# Patient Record
Sex: MALE | Race: WHITE | NOT HISPANIC OR LATINO | Employment: OTHER | URBAN - METROPOLITAN AREA
[De-identification: names, ages, dates, MRNs, and addresses within clinical notes are randomized per-mention and may not be internally consistent; named-entity substitution may affect disease eponyms.]

---

## 2017-03-03 PROBLEM — Z00.00 ROUTINE HEALTH MAINTENANCE: Status: ACTIVE | Noted: 2017-03-03

## 2017-04-06 PROBLEM — F17.200 TOBACCO DEPENDENCE: Status: ACTIVE | Noted: 2017-04-06

## 2017-06-05 PROBLEM — Z00.00 ROUTINE HEALTH MAINTENANCE: Status: RESOLVED | Noted: 2017-03-03 | Resolved: 2017-06-05

## 2017-08-21 PROBLEM — Z12.11 SCREEN FOR COLON CANCER: Status: ACTIVE | Noted: 2017-08-21

## 2017-09-08 PROBLEM — Z12.11 SCREEN FOR COLON CANCER: Status: RESOLVED | Noted: 2017-08-21 | Resolved: 2017-09-08

## 2018-03-09 PROBLEM — L08.9 LOCAL SKIN INFECTION: Status: ACTIVE | Noted: 2018-03-09

## 2018-06-22 PROBLEM — M25.531 RIGHT WRIST PAIN: Status: ACTIVE | Noted: 2018-06-22

## 2022-01-01 ENCOUNTER — HOSPITAL ENCOUNTER (EMERGENCY)
Facility: HOSPITAL | Age: 62
Discharge: HOME | End: 2022-12-22
Attending: SURGERY
Payer: MEDICARE

## 2022-01-01 ENCOUNTER — HOSPITAL ENCOUNTER (INPATIENT)
Facility: HOSPITAL | Age: 62
LOS: 5 days | Discharge: HOME | DRG: 192 | End: 2023-01-02
Attending: EMERGENCY MEDICINE | Admitting: INTERNAL MEDICINE
Payer: MEDICARE

## 2022-01-01 VITALS
RESPIRATION RATE: 24 BRPM | SYSTOLIC BLOOD PRESSURE: 147 MMHG | TEMPERATURE: 98 F | WEIGHT: 135 LBS | OXYGEN SATURATION: 100 % | BODY MASS INDEX: 20.46 KG/M2 | HEART RATE: 81 BPM | DIASTOLIC BLOOD PRESSURE: 83 MMHG | HEIGHT: 68 IN

## 2022-01-01 DIAGNOSIS — F17.200 TOBACCO DEPENDENCE: ICD-10-CM

## 2022-01-01 DIAGNOSIS — R06.00 DYSPNEA, UNSPECIFIED TYPE: ICD-10-CM

## 2022-01-01 DIAGNOSIS — J44.1 COPD EXACERBATION: Primary | ICD-10-CM

## 2022-01-01 DIAGNOSIS — J40 BRONCHITIS: ICD-10-CM

## 2022-01-01 DIAGNOSIS — J44.9 STAGE 4 VERY SEVERE COPD BY GOLD CLASSIFICATION: ICD-10-CM

## 2022-01-01 DIAGNOSIS — J44.1 COPD EXACERBATION: ICD-10-CM

## 2022-01-01 DIAGNOSIS — R06.02 SOB (SHORTNESS OF BREATH): Primary | ICD-10-CM

## 2022-01-01 DIAGNOSIS — R06.02 SHORTNESS OF BREATH: ICD-10-CM

## 2022-01-01 DIAGNOSIS — R07.9 CHEST PAIN: ICD-10-CM

## 2022-01-01 LAB
ALBUMIN SERPL BCP-MCNC: 3.9 G/DL (ref 3.5–5.2)
ALBUMIN SERPL BCP-MCNC: 4 G/DL (ref 3.5–5.2)
ALLENS TEST: ABNORMAL
ALLENS TEST: ABNORMAL
ALP SERPL-CCNC: 79 U/L (ref 55–135)
ALP SERPL-CCNC: 88 U/L (ref 55–135)
ALT SERPL W/O P-5'-P-CCNC: 20 U/L (ref 10–44)
ALT SERPL W/O P-5'-P-CCNC: 26 U/L (ref 10–44)
ANION GAP SERPL CALC-SCNC: 11 MMOL/L (ref 8–16)
ANION GAP SERPL CALC-SCNC: 14 MMOL/L (ref 8–16)
AST SERPL-CCNC: 18 U/L (ref 10–40)
AST SERPL-CCNC: 23 U/L (ref 10–40)
BASOPHILS # BLD AUTO: 0.1 K/UL (ref 0–0.2)
BASOPHILS # BLD AUTO: 0.1 K/UL (ref 0–0.2)
BASOPHILS NFR BLD: 0.5 % (ref 0–1.9)
BASOPHILS NFR BLD: 0.8 % (ref 0–1.9)
BILIRUB SERPL-MCNC: 0.5 MG/DL (ref 0.1–1)
BILIRUB SERPL-MCNC: 1 MG/DL (ref 0.1–1)
BILIRUB UR QL STRIP: NEGATIVE
BILIRUB UR QL STRIP: NEGATIVE
BNP SERPL-MCNC: 27 PG/ML (ref 0–99)
BNP SERPL-MCNC: 52 PG/ML (ref 0–99)
BUN SERPL-MCNC: 11 MG/DL (ref 8–23)
BUN SERPL-MCNC: 6 MG/DL (ref 8–23)
CALCIUM SERPL-MCNC: 9.8 MG/DL (ref 8.7–10.5)
CALCIUM SERPL-MCNC: 9.8 MG/DL (ref 8.7–10.5)
CHLORIDE SERPL-SCNC: 100 MMOL/L (ref 95–110)
CHLORIDE SERPL-SCNC: 100 MMOL/L (ref 95–110)
CK SERPL-CCNC: 68 U/L (ref 20–200)
CLARITY UR: CLEAR
CLARITY UR: CLEAR
CO2 SERPL-SCNC: 25 MMOL/L (ref 23–29)
CO2 SERPL-SCNC: 25 MMOL/L (ref 23–29)
COLOR UR: YELLOW
COLOR UR: YELLOW
CREAT SERPL-MCNC: 0.7 MG/DL (ref 0.5–1.4)
CREAT SERPL-MCNC: 0.8 MG/DL (ref 0.5–1.4)
DELSYS: ABNORMAL
DELSYS: ABNORMAL
DIFFERENTIAL METHOD: ABNORMAL
DIFFERENTIAL METHOD: ABNORMAL
EOSINOPHIL # BLD AUTO: 0.1 K/UL (ref 0–0.5)
EOSINOPHIL # BLD AUTO: 0.5 K/UL (ref 0–0.5)
EOSINOPHIL NFR BLD: 0.7 % (ref 0–8)
EOSINOPHIL NFR BLD: 4.1 % (ref 0–8)
ERYTHROCYTE [DISTWIDTH] IN BLOOD BY AUTOMATED COUNT: 12.6 % (ref 11.5–14.5)
ERYTHROCYTE [DISTWIDTH] IN BLOOD BY AUTOMATED COUNT: 13 % (ref 11.5–14.5)
EST. GFR  (NO RACE VARIABLE): >60 ML/MIN/1.73 M^2
EST. GFR  (NO RACE VARIABLE): >60 ML/MIN/1.73 M^2
FIO2: 21 (ref 21–100)
FIO2: 24 (ref 21–100)
GLUCOSE SERPL-MCNC: 80 MG/DL (ref 70–110)
GLUCOSE SERPL-MCNC: 94 MG/DL (ref 70–110)
GLUCOSE UR QL STRIP: ABNORMAL
GLUCOSE UR QL STRIP: NEGATIVE
HCO3 UR-SCNC: 27.4 MMOL/L
HCO3 UR-SCNC: 29.8 MMOL/L
HCT VFR BLD AUTO: 44.7 % (ref 40–54)
HCT VFR BLD AUTO: 49.9 % (ref 40–54)
HGB BLD-MCNC: 15.6 G/DL (ref 14–18)
HGB BLD-MCNC: 16.9 G/DL (ref 14–18)
HGB UR QL STRIP: ABNORMAL
HGB UR QL STRIP: ABNORMAL
IMM GRANULOCYTES # BLD AUTO: 0.07 K/UL (ref 0–0.04)
IMM GRANULOCYTES # BLD AUTO: 0.09 K/UL (ref 0–0.04)
IMM GRANULOCYTES NFR BLD AUTO: 0.5 % (ref 0–0.5)
IMM GRANULOCYTES NFR BLD AUTO: 0.5 % (ref 0–0.5)
INFLUENZA A, MOLECULAR: NEGATIVE
INFLUENZA A, MOLECULAR: NEGATIVE
INFLUENZA B, MOLECULAR: NEGATIVE
INFLUENZA B, MOLECULAR: NEGATIVE
INR PPP: 1 (ref 0.8–1.2)
KETONES UR QL STRIP: ABNORMAL
KETONES UR QL STRIP: NEGATIVE
LEUKOCYTE ESTERASE UR QL STRIP: NEGATIVE
LEUKOCYTE ESTERASE UR QL STRIP: NEGATIVE
LYMPHOCYTES # BLD AUTO: 1.9 K/UL (ref 1–4.8)
LYMPHOCYTES # BLD AUTO: 2.8 K/UL (ref 1–4.8)
LYMPHOCYTES NFR BLD: 21.1 % (ref 18–48)
LYMPHOCYTES NFR BLD: 9.9 % (ref 18–48)
MCH RBC QN AUTO: 33.3 PG (ref 27–31)
MCH RBC QN AUTO: 33.6 PG (ref 27–31)
MCHC RBC AUTO-ENTMCNC: 33.9 G/DL (ref 32–36)
MCHC RBC AUTO-ENTMCNC: 34.9 G/DL (ref 32–36)
MCV RBC AUTO: 96 FL (ref 82–98)
MCV RBC AUTO: 98 FL (ref 82–98)
MONOCYTES # BLD AUTO: 1.1 K/UL (ref 0.3–1)
MONOCYTES # BLD AUTO: 1.4 K/UL (ref 0.3–1)
MONOCYTES NFR BLD: 7.7 % (ref 4–15)
MONOCYTES NFR BLD: 8.3 % (ref 4–15)
NEUTROPHILS # BLD AUTO: 15.1 K/UL (ref 1.8–7.7)
NEUTROPHILS # BLD AUTO: 8.5 K/UL (ref 1.8–7.7)
NEUTROPHILS NFR BLD: 65.2 % (ref 38–73)
NEUTROPHILS NFR BLD: 80.7 % (ref 38–73)
NITRITE UR QL STRIP: NEGATIVE
NITRITE UR QL STRIP: NEGATIVE
NRBC BLD-RTO: 0 /100 WBC
NRBC BLD-RTO: 0 /100 WBC
PCO2 BLDA: 36 MMHG (ref 35–45)
PCO2 BLDA: 41 MMHG (ref 35–45)
PH SMN: 7.47 [PH] (ref 7.35–7.45)
PH SMN: 7.49 [PH] (ref 7.35–7.45)
PH UR STRIP: 7 [PH] (ref 5–8)
PH UR STRIP: 8 [PH] (ref 5–8)
PLATELET # BLD AUTO: 277 K/UL (ref 150–450)
PLATELET # BLD AUTO: 310 K/UL (ref 150–450)
PMV BLD AUTO: 8.9 FL (ref 9.2–12.9)
PMV BLD AUTO: 9.4 FL (ref 9.2–12.9)
PO2 BLDA: 71 MMHG (ref 75–100)
PO2 BLDA: 72 MMHG (ref 75–100)
POC BE: 4.2 MMOL/L (ref -2–2)
POC BE: 5.5 MMOL/L (ref -2–2)
POC COHB: 4.4 % (ref 0–3)
POC COHB: 5.5 % (ref 0–3)
POC METHB: 1.1 % (ref 0–1.5)
POC METHB: 1.2 % (ref 0–1.5)
POC O2HB ARTERIAL: 90.1 % (ref 94–100)
POC O2HB ARTERIAL: 91.1 % (ref 94–100)
POC SATURATED O2: 96.5 % (ref 90–100)
POC SATURATED O2: 96.5 % (ref 90–100)
POC TCO2: 28.5 MMOL/L
POC TCO2: 31.1 MMOL/L
POC THB: 15.9 G/DL (ref 12–18)
POC THB: 15.9 G/DL (ref 12–18)
POTASSIUM SERPL-SCNC: 4 MMOL/L (ref 3.5–5.1)
POTASSIUM SERPL-SCNC: 4.2 MMOL/L (ref 3.5–5.1)
PROT SERPL-MCNC: 7.1 G/DL (ref 6–8.4)
PROT SERPL-MCNC: 7.7 G/DL (ref 6–8.4)
PROT UR QL STRIP: NEGATIVE
PROT UR QL STRIP: NEGATIVE
PROTHROMBIN TIME: 10.3 SEC (ref 9–12.5)
RBC # BLD AUTO: 4.64 M/UL (ref 4.6–6.2)
RBC # BLD AUTO: 5.07 M/UL (ref 4.6–6.2)
SARS-COV-2 RDRP RESP QL NAA+PROBE: NEGATIVE
SARS-COV-2 RDRP RESP QL NAA+PROBE: NEGATIVE
SITE: ABNORMAL
SITE: ABNORMAL
SODIUM SERPL-SCNC: 136 MMOL/L (ref 136–145)
SODIUM SERPL-SCNC: 139 MMOL/L (ref 136–145)
SP GR UR STRIP: 1.01 (ref 1–1.03)
SP GR UR STRIP: 1.01 (ref 1–1.03)
SPECIMEN SOURCE: NORMAL
SPECIMEN SOURCE: NORMAL
TROPONIN I SERPL DL<=0.01 NG/ML-MCNC: 0.01 NG/ML (ref 0–0.03)
TROPONIN I SERPL DL<=0.01 NG/ML-MCNC: 0.01 NG/ML (ref 0–0.03)
TROPONIN I SERPL DL<=0.01 NG/ML-MCNC: <0.006 NG/ML (ref 0–0.03)
URN SPEC COLLECT METH UR: ABNORMAL
URN SPEC COLLECT METH UR: ABNORMAL
UROBILINOGEN UR STRIP-ACNC: NEGATIVE EU/DL
UROBILINOGEN UR STRIP-ACNC: NEGATIVE EU/DL
WBC # BLD AUTO: 13.07 K/UL (ref 3.9–12.7)
WBC # BLD AUTO: 18.74 K/UL (ref 3.9–12.7)

## 2022-01-01 PROCEDURE — 99232 SBSQ HOSP IP/OBS MODERATE 35: CPT | Mod: ,,, | Performed by: FAMILY MEDICINE

## 2022-01-01 PROCEDURE — 25000003 PHARM REV CODE 250: Performed by: FAMILY MEDICINE

## 2022-01-01 PROCEDURE — 25000003 PHARM REV CODE 250: Performed by: INTERNAL MEDICINE

## 2022-01-01 PROCEDURE — 94664 DEMO&/EVAL PT USE INHALER: CPT | Mod: XB

## 2022-01-01 PROCEDURE — 94761 N-INVAS EAR/PLS OXIMETRY MLT: CPT

## 2022-01-01 PROCEDURE — 25000242 PHARM REV CODE 250 ALT 637 W/ HCPCS: Performed by: EMERGENCY MEDICINE

## 2022-01-01 PROCEDURE — 36415 COLL VENOUS BLD VENIPUNCTURE: CPT | Performed by: SURGERY

## 2022-01-01 PROCEDURE — 99232 PR SUBSEQUENT HOSPITAL CARE,LEVL II: ICD-10-PCS | Mod: ,,, | Performed by: FAMILY MEDICINE

## 2022-01-01 PROCEDURE — 25000003 PHARM REV CODE 250: Performed by: EMERGENCY MEDICINE

## 2022-01-01 PROCEDURE — 85025 COMPLETE CBC W/AUTO DIFF WBC: CPT | Performed by: EMERGENCY MEDICINE

## 2022-01-01 PROCEDURE — 84484 ASSAY OF TROPONIN QUANT: CPT | Performed by: EMERGENCY MEDICINE

## 2022-01-01 PROCEDURE — 94640 AIRWAY INHALATION TREATMENT: CPT | Mod: XB

## 2022-01-01 PROCEDURE — 25000242 PHARM REV CODE 250 ALT 637 W/ HCPCS: Performed by: FAMILY MEDICINE

## 2022-01-01 PROCEDURE — 27000492 HC SLEEVE, SCD T/L

## 2022-01-01 PROCEDURE — 87502 INFLUENZA DNA AMP PROBE: CPT | Performed by: SURGERY

## 2022-01-01 PROCEDURE — 25000242 PHARM REV CODE 250 ALT 637 W/ HCPCS: Performed by: SURGERY

## 2022-01-01 PROCEDURE — 82803 BLOOD GASES ANY COMBINATION: CPT | Performed by: SURGERY

## 2022-01-01 PROCEDURE — 94640 AIRWAY INHALATION TREATMENT: CPT

## 2022-01-01 PROCEDURE — 63600175 PHARM REV CODE 636 W HCPCS: Performed by: NURSE PRACTITIONER

## 2022-01-01 PROCEDURE — 63600175 PHARM REV CODE 636 W HCPCS: Performed by: INTERNAL MEDICINE

## 2022-01-01 PROCEDURE — 25000242 PHARM REV CODE 250 ALT 637 W/ HCPCS: Performed by: NURSE PRACTITIONER

## 2022-01-01 PROCEDURE — 27000646 HC AEROBIKA DEVICE

## 2022-01-01 PROCEDURE — 99291 CRITICAL CARE FIRST HOUR: CPT | Mod: 25

## 2022-01-01 PROCEDURE — S4991 NICOTINE PATCH NONLEGEND: HCPCS | Performed by: NURSE PRACTITIONER

## 2022-01-01 PROCEDURE — 82803 BLOOD GASES ANY COMBINATION: CPT | Performed by: EMERGENCY MEDICINE

## 2022-01-01 PROCEDURE — 84484 ASSAY OF TROPONIN QUANT: CPT | Performed by: SURGERY

## 2022-01-01 PROCEDURE — U0002 COVID-19 LAB TEST NON-CDC: HCPCS | Performed by: EMERGENCY MEDICINE

## 2022-01-01 PROCEDURE — G0378 HOSPITAL OBSERVATION PER HR: HCPCS

## 2022-01-01 PROCEDURE — 82803 BLOOD GASES ANY COMBINATION: CPT

## 2022-01-01 PROCEDURE — 11000001 HC ACUTE MED/SURG PRIVATE ROOM

## 2022-01-01 PROCEDURE — 93010 ELECTROCARDIOGRAM REPORT: CPT | Mod: ,,, | Performed by: INTERNAL MEDICINE

## 2022-01-01 PROCEDURE — 99900035 HC TECH TIME PER 15 MIN (STAT)

## 2022-01-01 PROCEDURE — 27000221 HC OXYGEN, UP TO 24 HOURS

## 2022-01-01 PROCEDURE — 83880 ASSAY OF NATRIURETIC PEPTIDE: CPT | Performed by: SURGERY

## 2022-01-01 PROCEDURE — 99285 EMERGENCY DEPT VISIT HI MDM: CPT | Mod: 25

## 2022-01-01 PROCEDURE — 93005 ELECTROCARDIOGRAM TRACING: CPT

## 2022-01-01 PROCEDURE — 93010 EKG 12-LEAD: ICD-10-PCS | Mod: ,,, | Performed by: INTERNAL MEDICINE

## 2022-01-01 PROCEDURE — 63600175 PHARM REV CODE 636 W HCPCS: Performed by: EMERGENCY MEDICINE

## 2022-01-01 PROCEDURE — 99220 PR INITIAL OBSERVATION CARE,LEVL III: CPT | Mod: ,,, | Performed by: FAMILY MEDICINE

## 2022-01-01 PROCEDURE — 80053 COMPREHEN METABOLIC PANEL: CPT | Performed by: SURGERY

## 2022-01-01 PROCEDURE — 83880 ASSAY OF NATRIURETIC PEPTIDE: CPT | Performed by: EMERGENCY MEDICINE

## 2022-01-01 PROCEDURE — 96374 THER/PROPH/DIAG INJ IV PUSH: CPT

## 2022-01-01 PROCEDURE — 27000190 HC CPAP FULL FACE MASK W/VALVE

## 2022-01-01 PROCEDURE — 25000003 PHARM REV CODE 250: Performed by: NURSE PRACTITIONER

## 2022-01-01 PROCEDURE — 63600175 PHARM REV CODE 636 W HCPCS: Performed by: FAMILY MEDICINE

## 2022-01-01 PROCEDURE — 81003 URINALYSIS AUTO W/O SCOPE: CPT | Performed by: SURGERY

## 2022-01-01 PROCEDURE — 99220 PR INITIAL OBSERVATION CARE,LEVL III: ICD-10-PCS | Mod: ,,, | Performed by: FAMILY MEDICINE

## 2022-01-01 PROCEDURE — 99900031 HC PATIENT EDUCATION (STAT)

## 2022-01-01 PROCEDURE — 82550 ASSAY OF CK (CPK): CPT | Performed by: EMERGENCY MEDICINE

## 2022-01-01 PROCEDURE — 81003 URINALYSIS AUTO W/O SCOPE: CPT | Performed by: NURSE PRACTITIONER

## 2022-01-01 PROCEDURE — 94660 CPAP INITIATION&MGMT: CPT

## 2022-01-01 PROCEDURE — 96365 THER/PROPH/DIAG IV INF INIT: CPT

## 2022-01-01 PROCEDURE — 36600 WITHDRAWAL OF ARTERIAL BLOOD: CPT

## 2022-01-01 PROCEDURE — 36415 COLL VENOUS BLD VENIPUNCTURE: CPT | Performed by: EMERGENCY MEDICINE

## 2022-01-01 PROCEDURE — 80053 COMPREHEN METABOLIC PANEL: CPT | Performed by: EMERGENCY MEDICINE

## 2022-01-01 PROCEDURE — 25000003 PHARM REV CODE 250: Performed by: SURGERY

## 2022-01-01 PROCEDURE — U0002 COVID-19 LAB TEST NON-CDC: HCPCS | Performed by: SURGERY

## 2022-01-01 PROCEDURE — 96375 TX/PRO/DX INJ NEW DRUG ADDON: CPT

## 2022-01-01 PROCEDURE — 63600175 PHARM REV CODE 636 W HCPCS: Performed by: SURGERY

## 2022-01-01 PROCEDURE — 85025 COMPLETE CBC W/AUTO DIFF WBC: CPT | Performed by: SURGERY

## 2022-01-01 PROCEDURE — 94664 DEMO&/EVAL PT USE INHALER: CPT

## 2022-01-01 PROCEDURE — 85610 PROTHROMBIN TIME: CPT | Performed by: SURGERY

## 2022-01-01 PROCEDURE — 87502 INFLUENZA DNA AMP PROBE: CPT | Performed by: EMERGENCY MEDICINE

## 2022-01-01 RX ORDER — ATORVASTATIN CALCIUM 40 MG/1
40 TABLET, FILM COATED ORAL DAILY
Status: DISCONTINUED | OUTPATIENT
Start: 2022-01-01 | End: 2023-01-01 | Stop reason: HOSPADM

## 2022-01-01 RX ORDER — DULOXETIN HYDROCHLORIDE 20 MG/1
20 CAPSULE, DELAYED RELEASE ORAL DAILY
Status: DISCONTINUED | OUTPATIENT
Start: 2022-01-01 | End: 2023-01-01 | Stop reason: HOSPADM

## 2022-01-01 RX ORDER — METHYLPREDNISOLONE SOD SUCC 125 MG
125 VIAL (EA) INJECTION
Status: COMPLETED | OUTPATIENT
Start: 2022-01-01 | End: 2022-01-01

## 2022-01-01 RX ORDER — ACETYLCYSTEINE 200 MG/ML
4 SOLUTION ORAL; RESPIRATORY (INHALATION) ONCE
Status: COMPLETED | OUTPATIENT
Start: 2022-01-01 | End: 2022-01-01

## 2022-01-01 RX ORDER — METHYLPREDNISOLONE SOD SUCC 125 MG
125 VIAL (EA) INJECTION EVERY 6 HOURS
Status: COMPLETED | OUTPATIENT
Start: 2022-01-01 | End: 2022-01-01

## 2022-01-01 RX ORDER — IPRATROPIUM BROMIDE AND ALBUTEROL SULFATE 2.5; .5 MG/3ML; MG/3ML
3 SOLUTION RESPIRATORY (INHALATION)
Status: COMPLETED | OUTPATIENT
Start: 2022-01-01 | End: 2022-01-01

## 2022-01-01 RX ORDER — AZITHROMYCIN 250 MG/1
250 TABLET, FILM COATED ORAL DAILY
Qty: 6 TABLET | Refills: 0 | Status: ON HOLD | OUTPATIENT
Start: 2022-01-01 | End: 2023-01-01 | Stop reason: HOSPADM

## 2022-01-01 RX ORDER — ALBUTEROL SULFATE 90 UG/1
1-2 AEROSOL, METERED RESPIRATORY (INHALATION) EVERY 6 HOURS PRN
Qty: 6.7 G | Refills: 0 | Status: SHIPPED | OUTPATIENT
Start: 2022-01-01 | End: 2023-01-01 | Stop reason: SDUPTHER

## 2022-01-01 RX ORDER — IPRATROPIUM BROMIDE AND ALBUTEROL SULFATE 2.5; .5 MG/3ML; MG/3ML
3 SOLUTION RESPIRATORY (INHALATION) EVERY 4 HOURS
Status: DISCONTINUED | OUTPATIENT
Start: 2022-01-01 | End: 2023-01-01 | Stop reason: HOSPADM

## 2022-01-01 RX ORDER — CARVEDILOL 25 MG/1
25 TABLET ORAL 2 TIMES DAILY WITH MEALS
Status: DISCONTINUED | OUTPATIENT
Start: 2022-01-01 | End: 2023-01-01 | Stop reason: HOSPADM

## 2022-01-01 RX ORDER — ALBUTEROL SULFATE 0.83 MG/ML
2.5 SOLUTION RESPIRATORY (INHALATION)
Status: COMPLETED | OUTPATIENT
Start: 2022-01-01 | End: 2022-01-01

## 2022-01-01 RX ORDER — PREDNISONE 20 MG/1
TABLET ORAL
Qty: 18 TABLET | Refills: 0 | Status: SHIPPED | OUTPATIENT
Start: 2022-01-01 | End: 2023-01-01

## 2022-01-01 RX ORDER — ACETYLCYSTEINE 200 MG/ML
2 SOLUTION ORAL; RESPIRATORY (INHALATION) ONCE
Status: COMPLETED | OUTPATIENT
Start: 2022-01-01 | End: 2022-01-01

## 2022-01-01 RX ORDER — TRAMADOL HYDROCHLORIDE 50 MG/1
50 TABLET ORAL EVERY 8 HOURS PRN
Status: DISCONTINUED | OUTPATIENT
Start: 2022-01-01 | End: 2023-01-01 | Stop reason: HOSPADM

## 2022-01-01 RX ORDER — MONTELUKAST SODIUM 10 MG/1
10 TABLET ORAL NIGHTLY
Qty: 15 TABLET | Refills: 0 | Status: SHIPPED | OUTPATIENT
Start: 2022-01-01 | End: 2023-01-01

## 2022-01-01 RX ORDER — GUAIFENESIN 600 MG/1
1200 TABLET, EXTENDED RELEASE ORAL 2 TIMES DAILY
Status: DISCONTINUED | OUTPATIENT
Start: 2022-01-01 | End: 2023-01-01 | Stop reason: HOSPADM

## 2022-01-01 RX ORDER — GABAPENTIN 300 MG/1
300 CAPSULE ORAL 3 TIMES DAILY
Status: DISCONTINUED | OUTPATIENT
Start: 2022-01-01 | End: 2023-01-01 | Stop reason: HOSPADM

## 2022-01-01 RX ORDER — NICOTINE 7MG/24HR
1 PATCH, TRANSDERMAL 24 HOURS TRANSDERMAL DAILY
Status: DISCONTINUED | OUTPATIENT
Start: 2022-01-01 | End: 2023-01-01 | Stop reason: HOSPADM

## 2022-01-01 RX ORDER — LISINOPRIL 20 MG/1
40 TABLET ORAL DAILY
Status: DISCONTINUED | OUTPATIENT
Start: 2022-01-01 | End: 2023-01-01 | Stop reason: HOSPADM

## 2022-01-01 RX ORDER — HYDROCHLOROTHIAZIDE 12.5 MG/1
12.5 TABLET ORAL DAILY
Status: DISCONTINUED | OUTPATIENT
Start: 2022-01-01 | End: 2023-01-01 | Stop reason: HOSPADM

## 2022-01-01 RX ORDER — AMLODIPINE BESYLATE 10 MG/1
10 TABLET ORAL DAILY
Status: DISCONTINUED | OUTPATIENT
Start: 2022-01-01 | End: 2023-01-01 | Stop reason: HOSPADM

## 2022-01-01 RX ORDER — PREDNISONE 50 MG/1
50 TABLET ORAL DAILY
Qty: 5 TABLET | Refills: 0 | Status: SHIPPED | OUTPATIENT
Start: 2022-01-01 | End: 2022-01-01 | Stop reason: HOSPADM

## 2022-01-01 RX ORDER — LORAZEPAM 0.5 MG/1
0.5 TABLET ORAL NIGHTLY PRN
Status: DISCONTINUED | OUTPATIENT
Start: 2022-01-01 | End: 2023-01-01 | Stop reason: HOSPADM

## 2022-01-01 RX ORDER — SODIUM CHLORIDE 0.9 % (FLUSH) 0.9 %
10 SYRINGE (ML) INJECTION
Status: DISCONTINUED | OUTPATIENT
Start: 2022-01-01 | End: 2023-01-01 | Stop reason: HOSPADM

## 2022-01-01 RX ORDER — HYDROCODONE BITARTRATE AND ACETAMINOPHEN 5; 325 MG/1; MG/1
1 TABLET ORAL
Status: COMPLETED | OUTPATIENT
Start: 2022-01-01 | End: 2022-01-01

## 2022-01-01 RX ORDER — METHYLPREDNISOLONE SOD SUCC 125 MG
125 VIAL (EA) INJECTION EVERY 6 HOURS
Status: DISCONTINUED | OUTPATIENT
Start: 2022-01-01 | End: 2022-01-01

## 2022-01-01 RX ORDER — ACETAMINOPHEN 325 MG/1
650 TABLET ORAL EVERY 6 HOURS PRN
Status: DISCONTINUED | OUTPATIENT
Start: 2022-01-01 | End: 2023-01-01 | Stop reason: HOSPADM

## 2022-01-01 RX ORDER — NAPROXEN SODIUM 220 MG/1
81 TABLET, FILM COATED ORAL DAILY
Status: DISCONTINUED | OUTPATIENT
Start: 2022-01-01 | End: 2023-01-01 | Stop reason: HOSPADM

## 2022-01-01 RX ORDER — IPRATROPIUM BROMIDE AND ALBUTEROL SULFATE 2.5; .5 MG/3ML; MG/3ML
3 SOLUTION RESPIRATORY (INHALATION) EVERY 6 HOURS PRN
Qty: 75 ML | Refills: 0 | Status: SHIPPED | OUTPATIENT
Start: 2022-01-01 | End: 2023-01-01 | Stop reason: SDUPTHER

## 2022-01-01 RX ORDER — TALC
6 POWDER (GRAM) TOPICAL NIGHTLY PRN
Status: DISCONTINUED | OUTPATIENT
Start: 2022-01-01 | End: 2023-01-01 | Stop reason: HOSPADM

## 2022-01-01 RX ADMIN — CEFTRIAXONE SODIUM 1 G: 1 INJECTION, POWDER, FOR SOLUTION INTRAMUSCULAR; INTRAVENOUS at 12:12

## 2022-01-01 RX ADMIN — IPRATROPIUM BROMIDE AND ALBUTEROL SULFATE 3 ML: 2.5; .5 SOLUTION RESPIRATORY (INHALATION) at 07:12

## 2022-01-01 RX ADMIN — AMLODIPINE BESYLATE 10 MG: 10 TABLET ORAL at 08:12

## 2022-01-01 RX ADMIN — IPRATROPIUM BROMIDE AND ALBUTEROL SULFATE 3 ML: 2.5; .5 SOLUTION RESPIRATORY (INHALATION) at 11:12

## 2022-01-01 RX ADMIN — METHYLPREDNISOLONE SODIUM SUCCINATE 80 MG: 40 INJECTION, POWDER, FOR SOLUTION INTRAMUSCULAR; INTRAVENOUS at 02:12

## 2022-01-01 RX ADMIN — METHYLPREDNISOLONE SODIUM SUCCINATE 125 MG: 125 INJECTION, POWDER, FOR SOLUTION INTRAMUSCULAR; INTRAVENOUS at 07:12

## 2022-01-01 RX ADMIN — IPRATROPIUM BROMIDE AND ALBUTEROL SULFATE 3 ML: 2.5; .5 SOLUTION RESPIRATORY (INHALATION) at 12:12

## 2022-01-01 RX ADMIN — LISINOPRIL 40 MG: 20 TABLET ORAL at 08:12

## 2022-01-01 RX ADMIN — GABAPENTIN 300 MG: 300 CAPSULE ORAL at 08:12

## 2022-01-01 RX ADMIN — HYDROCHLOROTHIAZIDE 12.5 MG: 12.5 TABLET ORAL at 11:12

## 2022-01-01 RX ADMIN — IPRATROPIUM BROMIDE AND ALBUTEROL SULFATE 3 ML: 2.5; .5 SOLUTION RESPIRATORY (INHALATION) at 06:12

## 2022-01-01 RX ADMIN — IPRATROPIUM BROMIDE AND ALBUTEROL SULFATE 3 ML: 2.5; .5 SOLUTION RESPIRATORY (INHALATION) at 08:12

## 2022-01-01 RX ADMIN — GUAIFENESIN 1200 MG: 600 TABLET, EXTENDED RELEASE ORAL at 08:12

## 2022-01-01 RX ADMIN — CARVEDILOL 25 MG: 25 TABLET, FILM COATED ORAL at 04:12

## 2022-01-01 RX ADMIN — DULOXETINE HYDROCHLORIDE 20 MG: 20 CAPSULE, DELAYED RELEASE ORAL at 08:12

## 2022-01-01 RX ADMIN — CARVEDILOL 25 MG: 25 TABLET, FILM COATED ORAL at 06:12

## 2022-01-01 RX ADMIN — IPRATROPIUM BROMIDE AND ALBUTEROL SULFATE 3 ML: 2.5; .5 SOLUTION RESPIRATORY (INHALATION) at 04:12

## 2022-01-01 RX ADMIN — GABAPENTIN 300 MG: 300 CAPSULE ORAL at 03:12

## 2022-01-01 RX ADMIN — IPRATROPIUM BROMIDE AND ALBUTEROL SULFATE 3 ML: 2.5; .5 SOLUTION RESPIRATORY (INHALATION) at 03:12

## 2022-01-01 RX ADMIN — ASPIRIN 81 MG: 81 TABLET, CHEWABLE ORAL at 08:12

## 2022-01-01 RX ADMIN — LISINOPRIL 40 MG: 20 TABLET ORAL at 09:12

## 2022-01-01 RX ADMIN — METHYLPREDNISOLONE SODIUM SUCCINATE 125 MG: 125 INJECTION, POWDER, FOR SOLUTION INTRAMUSCULAR; INTRAVENOUS at 06:12

## 2022-01-01 RX ADMIN — HYDROCODONE BITARTRATE AND ACETAMINOPHEN 1 TABLET: 5; 325 TABLET ORAL at 12:12

## 2022-01-01 RX ADMIN — ATORVASTATIN CALCIUM 40 MG: 40 TABLET, FILM COATED ORAL at 08:12

## 2022-01-01 RX ADMIN — CARVEDILOL 25 MG: 25 TABLET, FILM COATED ORAL at 08:12

## 2022-01-01 RX ADMIN — ATORVASTATIN CALCIUM 40 MG: 40 TABLET, FILM COATED ORAL at 09:12

## 2022-01-01 RX ADMIN — GABAPENTIN 300 MG: 300 CAPSULE ORAL at 02:12

## 2022-01-01 RX ADMIN — LORAZEPAM 0.5 MG: 0.5 TABLET ORAL at 08:12

## 2022-01-01 RX ADMIN — DULOXETINE HYDROCHLORIDE 20 MG: 20 CAPSULE, DELAYED RELEASE ORAL at 11:12

## 2022-01-01 RX ADMIN — METHYLPREDNISOLONE SODIUM SUCCINATE 125 MG: 125 INJECTION, POWDER, FOR SOLUTION INTRAMUSCULAR; INTRAVENOUS at 05:12

## 2022-01-01 RX ADMIN — METHYLPREDNISOLONE SODIUM SUCCINATE 80 MG: 40 INJECTION, POWDER, FOR SOLUTION INTRAMUSCULAR; INTRAVENOUS at 06:12

## 2022-01-01 RX ADMIN — Medication 6 MG: at 01:12

## 2022-01-01 RX ADMIN — METHYLPREDNISOLONE SODIUM SUCCINATE 125 MG: 125 INJECTION, POWDER, FOR SOLUTION INTRAMUSCULAR; INTRAVENOUS at 12:12

## 2022-01-01 RX ADMIN — GUAIFENESIN 1200 MG: 600 TABLET, EXTENDED RELEASE ORAL at 11:12

## 2022-01-01 RX ADMIN — METHYLPREDNISOLONE SODIUM SUCCINATE 125 MG: 125 INJECTION, POWDER, FOR SOLUTION INTRAMUSCULAR; INTRAVENOUS at 11:12

## 2022-01-01 RX ADMIN — AMLODIPINE BESYLATE 10 MG: 10 TABLET ORAL at 09:12

## 2022-01-01 RX ADMIN — TRAMADOL HYDROCHLORIDE 50 MG: 50 TABLET, COATED ORAL at 12:12

## 2022-01-01 RX ADMIN — ACETYLCYSTEINE 2 ML: 200 INHALANT RESPIRATORY (INHALATION) at 11:12

## 2022-01-01 RX ADMIN — TRAMADOL HYDROCHLORIDE 50 MG: 50 TABLET, COATED ORAL at 04:12

## 2022-01-01 RX ADMIN — METHYLPREDNISOLONE SODIUM SUCCINATE 125 MG: 125 INJECTION, POWDER, FOR SOLUTION INTRAMUSCULAR; INTRAVENOUS at 01:12

## 2022-01-01 RX ADMIN — ALBUTEROL SULFATE 2.5 MG: 2.5 SOLUTION RESPIRATORY (INHALATION) at 07:12

## 2022-01-01 RX ADMIN — GUAIFENESIN 1200 MG: 600 TABLET, EXTENDED RELEASE ORAL at 09:12

## 2022-01-01 RX ADMIN — GABAPENTIN 300 MG: 300 CAPSULE ORAL at 09:12

## 2022-01-01 RX ADMIN — NICOTINE 1 PATCH: 7 PATCH, EXTENDED RELEASE TRANSDERMAL at 09:12

## 2022-01-01 RX ADMIN — TRAMADOL HYDROCHLORIDE 50 MG: 50 TABLET, COATED ORAL at 09:12

## 2022-01-01 RX ADMIN — METHYLPREDNISOLONE SODIUM SUCCINATE 80 MG: 40 INJECTION, POWDER, FOR SOLUTION INTRAMUSCULAR; INTRAVENOUS at 09:12

## 2022-01-01 RX ADMIN — ACETAMINOPHEN 650 MG: 325 TABLET ORAL at 11:12

## 2022-01-01 RX ADMIN — CARVEDILOL 25 MG: 25 TABLET, FILM COATED ORAL at 09:12

## 2022-01-01 RX ADMIN — ACETYLCYSTEINE 4 ML: 200 INHALANT RESPIRATORY (INHALATION) at 12:12

## 2022-01-01 RX ADMIN — METHYLPREDNISOLONE SODIUM SUCCINATE 40 MG: 40 INJECTION, POWDER, FOR SOLUTION INTRAMUSCULAR; INTRAVENOUS at 09:12

## 2022-01-01 RX ADMIN — NICOTINE 1 PATCH: 7 PATCH, EXTENDED RELEASE TRANSDERMAL at 08:12

## 2022-01-01 RX ADMIN — LORAZEPAM 0.5 MG: 0.5 TABLET ORAL at 09:12

## 2022-01-01 RX ADMIN — ASPIRIN 81 MG: 81 TABLET, CHEWABLE ORAL at 09:12

## 2022-01-01 RX ADMIN — NICOTINE 1 PATCH: 7 PATCH, EXTENDED RELEASE TRANSDERMAL at 11:12

## 2022-01-01 RX ADMIN — METHYLPREDNISOLONE SODIUM SUCCINATE 40 MG: 40 INJECTION, POWDER, FOR SOLUTION INTRAMUSCULAR; INTRAVENOUS at 01:12

## 2022-11-08 PROBLEM — J44.1 COPD EXACERBATION: Status: ACTIVE | Noted: 2022-11-08

## 2022-12-22 NOTE — ED PROVIDER NOTES
Encounter Date: 12/22/2022       History     Chief Complaint   Patient presents with    Shortness of Breath     61 yo male to ED with c/o SOB and coughing that began last night. Hx of COPD. Hypoxic upon arrival, O2 sats 88% on RA. Placed on 2L NC in triage. Denies recent known fevers.     COPD patient with a history of lung nodule presents with shortness of breath since last night he does not wear home oxy he does not complain of chest pain    The history is provided by the patient.   Shortness of Breath  This is a recurrent problem. The average episode lasts 1 day. The problem occurs continuously.The current episode started yesterday. The problem has been gradually worsening. Associated symptoms include cough and orthopnea. Pertinent negatives include no fever, no neck pain, no syncope and no claudication. He has tried nothing for the symptoms. The treatment provided no relief. Associated medical issues include COPD.   Review of patient's allergies indicates:  No Known Allergies  Past Medical History:   Diagnosis Date    Abnormal CT scan     Arthritis     COPD (chronic obstructive pulmonary disease)     Hypertension      Past Surgical History:   Procedure Laterality Date    COLONOSCOPY      COLONOSCOPY N/A 8/21/2017    Procedure: COLONOSCOPY;  Surgeon: Jessica Fink MD;  Location: [facility];  Service: Endoscopy;  Laterality: N/A;     Family History   Problem Relation Age of Onset    Emphysema Mother     Heart failure Father      Social History     Tobacco Use    Smoking status: Every Day     Packs/day: 0.50     Years: 37.00     Pack years: 18.50     Types: Cigarettes     Start date: 4/8/1979    Smokeless tobacco: Never    Tobacco comments:      Trying patches    Substance Use Topics    Alcohol use: Yes     Alcohol/week: 0.0 standard drinks     Comment: once a week    Drug use: No     Review of Systems   Constitutional: Negative.  Negative for fever.   Eyes: Negative.    Respiratory:  Positive for cough and  shortness of breath.    Cardiovascular:  Positive for orthopnea. Negative for claudication and syncope.   Gastrointestinal: Negative.    Endocrine: Negative.    Musculoskeletal: Negative.  Negative for neck pain.   Allergic/Immunologic: Negative.    Neurological: Negative.    Hematological: Negative.    Psychiatric/Behavioral: Negative.       Physical Exam     Initial Vitals   BP Pulse Resp Temp SpO2   12/22/22 1043 12/22/22 1042 12/22/22 1042 12/22/22 1043 12/22/22 1042   (!) 190/93 86 (!) 24 97.8 °F (36.6 °C) 97 %      MAP       --                Physical Exam    Nursing note and vitals reviewed.  Constitutional: He appears well-developed and well-nourished.   HENT:   Head: Normocephalic.   Cardiovascular:  Normal rate.           Pulmonary/Chest: No stridor. He has wheezes.   Patient is tight with restricted air movement in all bases     Neurological: He is alert and oriented to person, place, and time. GCS score is 15. GCS eye subscore is 4. GCS verbal subscore is 5. GCS motor subscore is 6.   Skin: Skin is warm.   Psychiatric: He has a normal mood and affect.       ED Course   Critical Care    Date/Time: 12/22/2022 1:28 PM  Performed by: NUVIA Gonsalez III, MD  Authorized by: NUVIA Gonsalez III, MD   Direct patient critical care time: 35 minutes  Total critical care time (exclusive of procedural time) : 35 minutes      Labs Reviewed   CBC W/ AUTO DIFFERENTIAL - Abnormal; Notable for the following components:       Result Value    WBC 18.74 (*)     MCH 33.3 (*)     Gran # (ANC) 15.1 (*)     Immature Grans (Abs) 0.09 (*)     Mono # 1.4 (*)     Gran % 80.7 (*)     Lymph % 9.9 (*)     All other components within normal limits   COMPREHENSIVE METABOLIC PANEL - Abnormal; Notable for the following components:    BUN 6 (*)     All other components within normal limits   URINALYSIS, REFLEX TO URINE CULTURE - Abnormal; Notable for the following components:    Ketones, UA Trace (*)     Occult Blood UA Trace (*)      All other components within normal limits    Narrative:     Specimen Source->Urine   INFLUENZA A & B BY MOLECULAR   SARS-COV-2 RNA AMPLIFICATION, QUAL   TROPONIN I   B-TYPE NATRIURETIC PEPTIDE   PROTIME-INR     EKG Readings: (Independently Interpreted)   Rhythm: Normal Sinus Rhythm. Heart Rate: 85. Conduction: RBBB.   ECG Results              EKG 12-lead (Final result)  Result time 12/22/22 13:03:29      Final result by Interface, Lab In [facility] (12/22/22 13:03:29)                   Narrative:    Test Reason : R06.02    Vent. Rate : 085 BPM     Atrial Rate : 085 BPM     P-R Int : 124 ms          QRS Dur : 096 ms      QT Int : 356 ms       P-R-T Axes : 074 083 080 degrees     QTc Int : 423 ms    Normal sinus rhythm  Possible Left atrial enlargement  Incomplete right bundle branch block  Abnormal ECG  When compared with ECG of 03-MAY-2018 07:34,  Premature atrial complexes are no longer Present  Confirmed by Sandra Chow MD ([id number]) on 12/22/2022 1:03:18 PM    Referred By: [email or web address]   SELF           Confirmed By:Sandra Chow MD                                  Imaging Results              X-Ray Chest 1 View (Final result)  Result time 12/22/22 11:30:09      Final result by Han Kelly MD (12/22/22 11:30:09)                   Impression:      Emphysematous changes with multiple scattered nodular lung opacities similar to recent chest CT of 12/02/2022.  No acute radiographic findings.      Electronically signed by: Han Kelly MD  Date:    12/22/2022  Time:    11:30               Narrative:    EXAMINATION:  XR CHEST 1 VIEW    CLINICAL HISTORY:  shortness of breath;    TECHNIQUE:  Single frontal view of the chest was performed.    COMPARISON:  12/02/2022    FINDINGS:  There are emphysematous changes.  Cardiac silhouette is normal in size.  No lobar consolidation, no pneumothorax or large pleural effusion.  Multiple nodular opacities are scattered throughout the lobe similar to recent chest CT of 12/02/2022.   Visualized skeletal structures show no acute abnormalities.                                    X-Rays:   Independently Interpreted Readings:   Chest X-Ray: No acute abnormalities. COPD with no acute changes on x-ray   Medications   albuterol-ipratropium 2.5 mg-0.5 mg/3 mL nebulizer solution 3 mL (3 mLs Nebulization Given 12/22/22 1145)   methylPREDNISolone sodium succinate injection 125 mg (125 mg Intravenous Given 12/22/22 1102)   albuterol-ipratropium 2.5 mg-0.5 mg/3 mL nebulizer solution 3 mL (3 mLs Nebulization Given 12/22/22 1237)   cefTRIAXone (ROCEPHIN) 1 g in dextrose 5 % in water (D5W) 5 % 50 mL IVPB (MB+) (0 g Intravenous Stopped 12/22/22 1308)   HYDROcodone-acetaminophen 5-325 mg per tablet 1 tablet (1 tablet Oral Given 12/22/22 1236)     Medical Decision Making:   Initial Assessment:   COPD exacerbation  ED Management:  Patient is undergoing a pulmonary nodule workup and scheduled for bronchoscopy he comes in with COPD exacerbation his oxygen level was 89 on admit he does not wear home oxygen he has an inhaler patient was given multiple neb treatments as lungs cleared up and opened up chest x-ray is clear but he has bronchitis and will treat him with antibiotics and I prescribed a home nebulizer treatments                        Clinical Impression:   Final diagnoses:  [R06.02] Shortness of breath  [J44.1] COPD exacerbation (Primary)  [J40] Bronchitis        ED Disposition Condition    Discharge Stable          ED Prescriptions       Medication Sig Dispense Start Date End Date Auth. Provider    albuterol-ipratropium (DUO-NEB) 2.5 mg-0.5 mg/3 mL nebulizer solution Take 3 mLs by nebulization every 6 (six) hours as needed for Wheezing. Rescue 75 mL 12/22/2022 12/22/2023 NUVIA Gonsalez III, MD    azithromycin (Z-DEEPA) 250 MG tablet Take 1 tablet (250 mg total) by mouth once daily. Take first 2 tablets together, then 1 every day until finished. 6 tablet 12/22/2022 -- NUVIA Gonsalez III, MD           Follow-up Information    None          NUVIA Gonsalez III, MD  12/22/22 1774

## 2022-12-28 PROBLEM — F32.A ANXIETY AND DEPRESSION: Status: ACTIVE | Noted: 2022-01-01

## 2022-12-28 PROBLEM — F41.9 ANXIETY AND DEPRESSION: Status: ACTIVE | Noted: 2022-01-01

## 2022-12-28 NOTE — SUBJECTIVE & OBJECTIVE
Past Medical History:   Diagnosis Date    Abnormal CT scan     Arthritis     COPD (chronic obstructive pulmonary disease)     Hypertension        Past Surgical History:   Procedure Laterality Date    COLONOSCOPY      COLONOSCOPY N/A 8/21/2017    Procedure: COLONOSCOPY;  Surgeon: Jessica Fink MD;  Location: [facility];  Service: Endoscopy;  Laterality: N/A;       Review of patient's allergies indicates:  No Known Allergies    No current facility-administered medications on file prior to encounter.     Current Outpatient Medications on File Prior to Encounter   Medication Sig    albuterol (PROVENTIL HFA) 90 mcg/actuation inhaler Inhale 2 puffs into the lungs every 6 (six) hours as needed for Wheezing.    albuterol-ipratropium (DUO-NEB) 2.5 mg-0.5 mg/3 mL nebulizer solution Take 3 mLs by nebulization every 6 (six) hours as needed for Wheezing. Rescue    amLODIPine (NORVASC) 10 MG tablet Take 1 tablet (10 mg total) by mouth once daily.    amoxicillin-clavulanate 875-125mg (AUGMENTIN) 875-125 mg per tablet Take 1 tablet by mouth 2 (two) times daily.    aspirin 81 MG Chew Take 1 tablet (81 mg total) by mouth once daily.    azithromycin (Z-DEEPA) 250 MG tablet Take 1 tablet (250 mg total) by mouth once daily. Take first 2 tablets together, then 1 every day until finished.    carvediloL (COREG) 25 MG tablet Take 1 tablet (25 mg total) by mouth 2 (two) times daily with meals.    gabapentin (NEURONTIN) 300 MG capsule Take 1 capsule (300 mg total) by mouth 3 (three) times daily.    LIPITOR 40 mg tablet Take 1 tablet (40 mg total) by mouth once daily.    lisinopriL (PRINIVIL,ZESTRIL) 20 MG tablet Take 2 tablets (40 mg total) by mouth once daily.    sulfamethoxazole-trimethoprim 800-160mg (BACTRIM DS) 800-160 mg Tab Take 1 tablet by mouth 2 (two) times daily.    traMADoL (ULTRAM) 50 mg tablet Take 1 tablet (50 mg total) by mouth every 8 (eight) hours as needed.    umeclidinium-vilanteroL (ANORO ELLIPTA) 62.5-25  "mcg/actuation DsDv Inhale 1 puff into the lungs once daily. Controller     Family History       Problem Relation (Age of Onset)    Emphysema Mother    Heart failure Father          Tobacco Use    Smoking status: Every Day     Packs/day: 0.50     Years: 37.00     Pack years: 18.50     Types: Cigarettes     Start date: 4/8/1979    Smokeless tobacco: Never    Tobacco comments:      Trying patches    Substance and Sexual Activity    Alcohol use: Yes     Alcohol/week: 0.0 standard drinks     Comment: once a week    Drug use: No    Sexual activity: Not Currently     Review of Systems   Constitutional:  Positive for activity change, chills, diaphoresis and unexpected weight change (15# in last month). Negative for appetite change and fever.   HENT:  Negative for congestion, sinus pressure and sore throat.    Respiratory:  Positive for cough, chest tightness, shortness of breath and wheezing.    Cardiovascular:  Negative for palpitations and leg swelling.        Chest tightness   Gastrointestinal:  Negative for diarrhea, nausea and vomiting.   Genitourinary:  Positive for frequency. Negative for dysuria and urgency.   Musculoskeletal:  Positive for arthralgias and myalgias.        Legs feel numb and tingling    Skin:  Negative for rash and wound.   Neurological:  Positive for light-headedness and headaches (better with suppleemntal O2). Negative for dizziness and syncope.   Psychiatric/Behavioral:  Positive for dysphoric mood. Negative for sleep disturbance. The patient is nervous/anxious.         Feels "Grouchy"courtney easily during exam   Objective:     Vital Signs (Most Recent):  Temp: 98.9 °F (37.2 °C) (12/28/22 0750)  Pulse: 87 (12/28/22 0800)  Resp: 20 (12/28/22 0750)  BP: (!) 143/77 (12/28/22 0750)  SpO2: (S) 98 % (ear probe) (12/28/22 0752)   Vital Signs (24h Range):  Temp:  [97.7 °F (36.5 °C)-98.9 °F (37.2 °C)] 98.9 °F (37.2 °C)  Pulse:  [] 87  Resp:  [14-30] 20  SpO2:  [91 %-100 %] 98 %  BP: (124-171)/(73-95) " 143/77     Weight: 70.5 kg (155 lb 6.8 oz)  Body mass index is 22.3 kg/m².    Physical Exam  Constitutional:       Appearance: Normal appearance. He is well-developed. He is not ill-appearing.   HENT:      Head: Normocephalic and atraumatic.      Right Ear: External ear normal.      Left Ear: External ear normal.      Nose: Nose normal.      Mouth/Throat:      Mouth: Mucous membranes are moist.      Pharynx: No oropharyngeal exudate or posterior oropharyngeal erythema.   Eyes:      General: No scleral icterus.        Right eye: No discharge.         Left eye: No discharge.      Conjunctiva/sclera: Conjunctivae normal.      Pupils: Pupils are equal, round, and reactive to light.   Neck:      Thyroid: No thyromegaly.      Vascular: No JVD.      Trachea: No tracheal deviation.   Cardiovascular:      Rate and Rhythm: Normal rate and regular rhythm.      Heart sounds: Normal heart sounds. No murmur heard.  Pulmonary:      Effort: Pulmonary effort is normal. No respiratory distress.      Breath sounds: Wheezing present. No rales.   Chest:      Chest wall: No tenderness.   Abdominal:      General: Bowel sounds are normal. There is no distension.      Palpations: Abdomen is soft. There is no mass.      Tenderness: There is no abdominal tenderness. There is no guarding or rebound.   Musculoskeletal:         General: Normal range of motion.      Cervical back: Normal range of motion and neck supple.      Right lower leg: No edema.      Left lower leg: No edema.   Lymphadenopathy:      Cervical: No cervical adenopathy.   Skin:     General: Skin is warm and dry.   Neurological:      Mental Status: He is alert and oriented to person, place, and time.      Cranial Nerves: No cranial nerve deficit.      Motor: No abnormal muscle tone.      Coordination: Coordination normal.      Deep Tendon Reflexes: Reflexes are normal and symmetric. Reflexes normal.   Psychiatric:         Behavior: Behavior normal.         Thought Content:  Thought content normal.         Judgment: Judgment normal.      Comments: Tearful          CRANIAL NERVES     CN III, IV, VI   Pupils are equal, round, and reactive to light.     Significant Labs: All pertinent labs within the past 24 hours have been reviewed.  CBC:   Recent Labs   Lab 12/27/22 1837   WBC 13.07*   HGB 15.6   HCT 44.7        CMP:   Recent Labs   Lab 12/27/22 1836      K 4.2      CO2 25   GLU 80   BUN 11   CREATININE 0.7   CALCIUM 9.8   PROT 7.1   ALBUMIN 3.9   BILITOT 0.5   ALKPHOS 79   AST 23   ALT 26   ANIONGAP 11     Cardiac Markers:   Recent Labs   Lab 12/27/22  1837   BNP 27     Recent Labs   Lab 12/27/22  1836   CPK 68   TROPONINI 0.008  0.008     Covid negative   Flu negative     9/8/22 quantiferon GOLD negative     Significant Imaging:     CXR No acute radiographic abnormality    EKG Normal sinus rhythm  Biatrial enlargement  Incomplete right bundle branch block  Abnormal ECG  When compared with ECG of 22-DEC-2022 10:52,  Criteria for Septal infarct are no longer Present  Nonspecific T wave abnormality no longer evident in Anterior leads

## 2022-12-28 NOTE — H&P
[facility] ([address] [place] [address] Medicine  History & Physical    Patient Name: Brando Culp Jr.  MRN: [id number]  Patient Class: OP- Observation  Admission Date: 12/27/2022  Attending Physician: Tobi Bahena MD   Primary Care Provider: Dioni Matson MD         Patient information was obtained from ER records.     Subjective:     Principal Problem:COPD exacerbation    Chief Complaint:   Chief Complaint   Patient presents with    Shortness of Breath     Patient to ER CC of SOB since Thursday, getting worse today, states he can hardly walk a few steps because of the SOB, he also reports tightness in his chest which started today        HPI: 61yo male patient with hx of arthritis COPD and HTN presented to ER with c/o SOB. Started last Thursday and has progressed to unable to take more than a few steps due to BANGURA. Pt has end stage COPD and was seen last week in ER and sent home on ABX. Underlying lung lesions scheduled for bronchoscopy int he beginning of next month. Quantiferon gold 9/2022 was negative. He was given 3 stacked nebs as well as medrol 125mg in ER. No fever. Sat % on RA. WBC 71202 (down from 74416 last week). CXR with chronic changes. No acute infiltrates. PCO2 41. PO2 72 and bicarb 29 on RA. Pt was placed in observation for COPD exacerbation for nebs and IV steroids. He reports that he still feels SOB- feels like he hits a brick wall when tryiong to taskr a deep breath in. Denies fever at home but + sweats and chills. Started about a month ago when he had the flu. He is feeling a little better this am from admission     12/2/22>>> ct chest    Progressive central cavitation of the thick wall lesion in the left lower lobe since the prior examination.   Calcified nodules and bronchiectasis in the left upper lobe.   Irregular areas of consolidation in the right middle lobe and left upper lobe which have increased in prominence compared to the prior study.  A PET-CT scan could be obtained for  further assessment to evaluate the significance of these findings.   Emphysematous changes in the lungs bilaterally.      Past Medical History:   Diagnosis Date    Abnormal CT scan     Arthritis     COPD (chronic obstructive pulmonary disease)     Hypertension        Past Surgical History:   Procedure Laterality Date    COLONOSCOPY      COLONOSCOPY N/A 8/21/2017    Procedure: COLONOSCOPY;  Surgeon: Jessica Fink MD;  Location: [facility];  Service: Endoscopy;  Laterality: N/A;       Review of patient's allergies indicates:  No Known Allergies    No current facility-administered medications on file prior to encounter.     Current Outpatient Medications on File Prior to Encounter   Medication Sig    albuterol (PROVENTIL HFA) 90 mcg/actuation inhaler Inhale 2 puffs into the lungs every 6 (six) hours as needed for Wheezing.    albuterol-ipratropium (DUO-NEB) 2.5 mg-0.5 mg/3 mL nebulizer solution Take 3 mLs by nebulization every 6 (six) hours as needed for Wheezing. Rescue    amLODIPine (NORVASC) 10 MG tablet Take 1 tablet (10 mg total) by mouth once daily.    amoxicillin-clavulanate 875-125mg (AUGMENTIN) 875-125 mg per tablet Take 1 tablet by mouth 2 (two) times daily.    aspirin 81 MG Chew Take 1 tablet (81 mg total) by mouth once daily.    azithromycin (Z-DEEPA) 250 MG tablet Take 1 tablet (250 mg total) by mouth once daily. Take first 2 tablets together, then 1 every day until finished.    carvediloL (COREG) 25 MG tablet Take 1 tablet (25 mg total) by mouth 2 (two) times daily with meals.    gabapentin (NEURONTIN) 300 MG capsule Take 1 capsule (300 mg total) by mouth 3 (three) times daily.    LIPITOR 40 mg tablet Take 1 tablet (40 mg total) by mouth once daily.    lisinopriL (PRINIVIL,ZESTRIL) 20 MG tablet Take 2 tablets (40 mg total) by mouth once daily.    sulfamethoxazole-trimethoprim 800-160mg (BACTRIM DS) 800-160 mg Tab Take 1 tablet by mouth 2 (two) times daily.    traMADoL (ULTRAM) 50  "mg tablet Take 1 tablet (50 mg total) by mouth every 8 (eight) hours as needed.    umeclidinium-vilanteroL (ANORO ELLIPTA) 62.5-25 mcg/actuation DsDv Inhale 1 puff into the lungs once daily. Controller     Family History       Problem Relation (Age of Onset)    Emphysema Mother    Heart failure Father          Tobacco Use    Smoking status: Every Day     Packs/day: 0.50     Years: 37.00     Pack years: 18.50     Types: Cigarettes     Start date: 4/8/1979    Smokeless tobacco: Never    Tobacco comments:      Trying patches    Substance and Sexual Activity    Alcohol use: Yes     Alcohol/week: 0.0 standard drinks     Comment: once a week    Drug use: No    Sexual activity: Not Currently     Review of Systems   Constitutional:  Positive for activity change, chills, diaphoresis and unexpected weight change (15# in last month). Negative for appetite change and fever.   HENT:  Negative for congestion, sinus pressure and sore throat.    Respiratory:  Positive for cough, chest tightness, shortness of breath and wheezing.    Cardiovascular:  Negative for palpitations and leg swelling.        Chest tightness   Gastrointestinal:  Negative for diarrhea, nausea and vomiting.   Genitourinary:  Positive for frequency. Negative for dysuria and urgency.   Musculoskeletal:  Positive for arthralgias and myalgias.        Legs feel numb and tingling    Skin:  Negative for rash and wound.   Neurological:  Positive for light-headedness and headaches (better with suppleemntal O2). Negative for dizziness and syncope.   Psychiatric/Behavioral:  Positive for dysphoric mood. Negative for sleep disturbance. The patient is nervous/anxious.         Feels "Grouchy"courtney easily during exam   Objective:     Vital Signs (Most Recent):  Temp: 98.9 °F (37.2 °C) (12/28/22 0750)  Pulse: 87 (12/28/22 0800)  Resp: 20 (12/28/22 0750)  BP: (!) 143/77 (12/28/22 0750)  SpO2: (S) 98 % (ear probe) (12/28/22 0752)   Vital Signs (24h Range):  Temp:  [97.7 °F " (36.5 °C)-98.9 °F (37.2 °C)] 98.9 °F (37.2 °C)  Pulse:  [] 87  Resp:  [14-30] 20  SpO2:  [91 %-100 %] 98 %  BP: (124-171)/(73-95) 143/77     Weight: 70.5 kg (155 lb 6.8 oz)  Body mass index is 22.3 kg/m².    Physical Exam  Constitutional:       Appearance: Normal appearance. He is well-developed. He is not ill-appearing.   HENT:      Head: Normocephalic and atraumatic.      Right Ear: External ear normal.      Left Ear: External ear normal.      Nose: Nose normal.      Mouth/Throat:      Mouth: Mucous membranes are moist.      Pharynx: No oropharyngeal exudate or posterior oropharyngeal erythema.   Eyes:      General: No scleral icterus.        Right eye: No discharge.         Left eye: No discharge.      Conjunctiva/sclera: Conjunctivae normal.      Pupils: Pupils are equal, round, and reactive to light.   Neck:      Thyroid: No thyromegaly.      Vascular: No JVD.      Trachea: No tracheal deviation.   Cardiovascular:      Rate and Rhythm: Normal rate and regular rhythm.      Heart sounds: Normal heart sounds. No murmur heard.  Pulmonary:      Effort: Pulmonary effort is normal. No respiratory distress.      Breath sounds: Wheezing present. No rales.   Chest:      Chest wall: No tenderness.   Abdominal:      General: Bowel sounds are normal. There is no distension.      Palpations: Abdomen is soft. There is no mass.      Tenderness: There is no abdominal tenderness. There is no guarding or rebound.   Musculoskeletal:         General: Normal range of motion.      Cervical back: Normal range of motion and neck supple.      Right lower leg: No edema.      Left lower leg: No edema.   Lymphadenopathy:      Cervical: No cervical adenopathy.   Skin:     General: Skin is warm and dry.   Neurological:      Mental Status: He is alert and oriented to person, place, and time.      Cranial Nerves: No cranial nerve deficit.      Motor: No abnormal muscle tone.      Coordination: Coordination normal.      Deep Tendon  Reflexes: Reflexes are normal and symmetric. Reflexes normal.   Psychiatric:         Behavior: Behavior normal.         Thought Content: Thought content normal.         Judgment: Judgment normal.      Comments: Tearful          CRANIAL NERVES     CN III, IV, VI   Pupils are equal, round, and reactive to light.     Significant Labs: All pertinent labs within the past 24 hours have been reviewed.  CBC:   Recent Labs   Lab 12/27/22 1837   WBC 13.07*   HGB 15.6   HCT 44.7        CMP:   Recent Labs   Lab 12/27/22 1836      K 4.2      CO2 25   GLU 80   BUN 11   CREATININE 0.7   CALCIUM 9.8   PROT 7.1   ALBUMIN 3.9   BILITOT 0.5   ALKPHOS 79   AST 23   ALT 26   ANIONGAP 11     Cardiac Markers:   Recent Labs   Lab 12/27/22 1837   BNP 27     Recent Labs   Lab 12/27/22 1836   CPK 68   TROPONINI 0.008  0.008     Covid negative   Flu negative     9/8/22 quantiferon GOLD negative     Significant Imaging:     CXR No acute radiographic abnormality    EKG Normal sinus rhythm  Biatrial enlargement  Incomplete right bundle branch block  Abnormal ECG  When compared with ECG of 22-DEC-2022 10:52,  Criteria for Septal infarct are no longer Present  Nonspecific T wave abnormality no longer evident in Anterior leads    Assessment/Plan:     * COPD exacerbation  duonebs every 4hr  Medrol 125mg IV every 6hr  RA sats %      Anxiety and depression  start low dose cymbalta for mood as well as chronic neuropathy ain in legs add ativan very low dose for sleep as needed (he is very anxious as well)      Bilateral low back pain with sciatica  Cont home gabapentin       Stage 4 very severe COPD by GOLD classification  Steroids and nebs  F/u pulm outpt as scheduled for bronch next month       Essential hypertension  Cont home meds lisinopril, coreg and amlodipine   /77      VTE Risk Mitigation (From admission, onward)         Ordered     IP VTE HIGH RISK PATIENT  Once         12/27/22 2211     Place sequential  compression device  Until discontinued         12/27/22 2961                   Sudhakar Delgadillo MD  Department of Hospital Medicine   [place] [facility] S[facility] (3rd Fl)

## 2022-12-28 NOTE — NURSING
"Pt with complaints of SOB while sitting up in bed. He says "it feels like the air just hits a brick wall"    Oxygen sat on finger 91-92%    Oxygen on ear 100%  "

## 2022-12-28 NOTE — HPI
61yo male patient with hx of arthritis COPD and HTN presented to ER with c/o SOB. Started last Thursday and has progressed to unable to take more than a few steps due to BANGURA. Pt has end stage COPD and was seen last week in ER and sent home on ABX. Underlying lung lesions scheduled for bronchoscopy int he beginning of next month. Quantiferon gold 9/2022 was negative. He was given 3 stacked nebs as well as medrol 125mg in ER. No fever. Sat % on RA. WBC 55826 (down from 08865 last week). CXR with chronic changes. No acute infiltrates. PCO2 41. PO2 72 and bicarb 29 on RA. Pt was placed in observation for COPD exacerbation for nebs and IV steroids. He reports that he still feels SOB- feels like he hits a brick wall when tryiong to taskr a deep breath in. Denies fever at home but + sweats and chills. Started about a month ago when he had the flu. He is feeling a little better this am from admission     12/2/22>>> ct chest    Progressive central cavitation of the thick wall lesion in the left lower lobe since the prior examination.   Calcified nodules and bronchiectasis in the left upper lobe.   Irregular areas of consolidation in the right middle lobe and left upper lobe which have increased in prominence compared to the prior study.  A PET-CT scan could be obtained for further assessment to evaluate the significance of these findings.   Emphysematous changes in the lungs bilaterally.

## 2022-12-28 NOTE — PLAN OF CARE
[facility] - Med Surg (3rd Fl)  Initial Discharge Assessment       Primary Care Provider: Dioni Matson MD    Admission Diagnosis: SOB (shortness of breath) [R06.02]  COPD exacerbation [J44.1]  Chest pain [R07.9]  Dyspnea, unspecified type [R06.00]    Admission Date: 12/27/2022  Expected Discharge Date:     Discharge Barriers Identified: None    Payor: MEDICARE / Plan: MEDICARE PART A & B / Product Type: Government /     Extended Emergency Contact Information  Primary Emergency Contact: Lloyd Culp   [place]  Mobile Phone: [phone]  Relation: Daughter  Secondary Emergency Contact: Michelle Culp  Address: [address] [place] [address] [id number] [place] [address]  Home Phone: [phone]  Mobile Phone: [phone]  Relation: Daughter    Discharge Plan A: Home with family  Discharge Plan B: Home Health      [facility]  [address] [facility] [place] [address]  Phone: [phone] Fax: [phone]    [place] [id number] [facility] [place] - [address]  [address] [facility]  [place] [address]  Phone: [phone] Fax: [phone]    [facility] [place] - [facility]  [place] [address]  Phone: [phone] Fax: [phone]      Initial Assessment (most recent)       Adult Discharge Assessment - 12/28/22 1434          Discharge Assessment    Assessment Type Discharge Planning Assessment     Confirmed/corrected address, phone number and insurance Yes     Confirmed Demographics Correct on Facesheet     Source of Information patient     When was your last doctors appointment? 09/08/22     Does patient/caregiver understand observation status Yes     Communicated SELVIN with patient/caregiver Yes     Reason For Admission COPD exacerbation     People in Home child(shirley), adult     Facility Arrived From: home     Do you expect to return to your current living  situation? Yes     Do you have help at home or someone to help you manage your care at home? Yes     Who are your caregiver(s) and their phone number(s)? Humera CuplZaef-[email or web address]-[phone]     Prior to hospitilization cognitive status: Alert/Oriented     Current cognitive status: Alert/Oriented     Home Accessibility wheelchair accessible     Home Layout Able to live on 1st floor     Equipment Currently Used at Home nebulizer     Readmission within 30 days? No     Patient currently being followed by outpatient case management? No     Do you currently have service(s) that help you manage your care at home? No     Do you take prescription medications? Yes     Do you have prescription coverage? No     Do you have any problems affording any of your prescribed medications? No     Is the patient taking medications as prescribed? yes     Who is going to help you get home at discharge? daughter     How do you get to doctors appointments? family or friend will provide     Are you on dialysis? No     Do you take coumadin? No     Discharge Plan A Home with family     Discharge Plan B Home Health     DME Needed Upon Discharge  other (see comments)   TBD    Discharge Plan discussed with: Patient     Discharge Barriers Identified None                   Mr Culp is here with COPD. He plans to discharge home with his daughter when stable. He currently does not have home oxygen. CM is available to assistance.

## 2022-12-28 NOTE — ASSESSMENT & PLAN NOTE
start low dose cymbalta for mood as well as chronic neuropathy ain in legs add ativan very low dose for sleep as needed (he is very anxious as well)

## 2022-12-28 NOTE — NURSING
Pt up from ed via stretcher pt positioned self in bed report from alisha ortiz rn oriented pt to room and surroundings call light in reach plan of care reviewed with pt.

## 2022-12-28 NOTE — DISCHARGE INSTRUCTIONS
Pre shortness of breath or feeling worse please return to the emergency room otherwise follow-up with pulmonologist and keep appointment for bronchus scheduled

## 2022-12-28 NOTE — ED PROVIDER NOTES
Encounter Date: 12/27/2022       History     Chief Complaint   Patient presents with    Shortness of Breath     Patient to ER CC of SOB since Thursday, getting worse today, states he can hardly walk a few steps because of the SOB, he also reports tightness in his chest which started today     .Patient presents with:  Shortness of Breath: Patient to ER CC of SOB since Thursday, getting worse today, states he can hardly walk a few steps because of the SOB, he also reports tightness in his chest which started today        Seen in er last week w sob and w underlying lung lesions    Left lung: cavitary lesion central lung w calicifactions in lower lung on left scheduled for bronch w biopsy next month and w end stage copd now w increase in sob for 1 wk   In er last week given abx and breathing tx and also w abx at home     Review of patient's allergies indicates:  No Known Allergies  Past Medical History:   Diagnosis Date    Abnormal CT scan     Arthritis     COPD (chronic obstructive pulmonary disease)     Hypertension      Past Surgical History:   Procedure Laterality Date    COLONOSCOPY      COLONOSCOPY N/A 8/21/2017    Procedure: COLONOSCOPY;  Surgeon: Jessica Fink MD;  [facility];  Service: Endoscopy;  Laterality: N/A;     Family History   Problem Relation Age of Onset    Emphysema Mother     Heart failure Father      Social History     Tobacco Use    Smoking status: Every Day     Packs/day: 0.50     Years: 37.00     Pack years: 18.50     Types: Cigarettes     Start date: 4/8/1979    Smokeless tobacco: Never    Tobacco comments:      Trying patches    Substance Use Topics    Alcohol use: Yes     Alcohol/week: 0.0 standard drinks     Comment: once a week    Drug use: No     Review of Systems   Constitutional:  Negative for fever.   HENT:  Negative for sore throat.    Respiratory:  Positive for cough, chest tightness and shortness of breath.    Cardiovascular:  Negative for chest pain.    Gastrointestinal:  Negative for nausea.   Genitourinary:  Negative for dysuria.   Musculoskeletal:  Negative for back pain.   Skin:  Negative for rash.   Neurological:  Negative for weakness.   Hematological:  Does not bruise/bleed easily.   All other systems reviewed and are negative.    Physical Exam     Initial Vitals [12/27/22 1822]   BP Pulse Resp Temp SpO2   (!) 171/95 100 (!) 30 98.7 °F (37.1 °C) 98 %      MAP       --         Physical Exam    Nursing note and vitals reviewed.  Constitutional: He appears well-developed and well-nourished.   HENT:   Head: Normocephalic and atraumatic.   Eyes: EOM are normal. Pupils are equal, round, and reactive to light.   Neck:   Normal range of motion.  Cardiovascular:  Normal rate and regular rhythm.           Pulmonary/Chest: He has rhonchi.   Bs rhonchi and resp rate of 30 bpm and retractions    Abdominal: Abdomen is soft. Bowel sounds are normal.   Musculoskeletal:         General: Normal range of motion.      Cervical back: Normal range of motion.     Neurological: He is alert and oriented to person, place, and time. He has normal strength and normal reflexes.   Skin: Skin is warm and dry. Capillary refill takes less than 2 seconds.       ED Course   Procedures  Labs Reviewed   CBC W/ AUTO DIFFERENTIAL - Abnormal; Notable for the following components:       Result Value    WBC 13.07 (*)     MCH 33.6 (*)     MPV 8.9 (*)     Gran # (ANC) 8.5 (*)     Immature Grans (Abs) 0.07 (*)     Mono # 1.1 (*)     All other components within normal limits   INFLUENZA A & B BY MOLECULAR   COMPREHENSIVE METABOLIC PANEL   CK   TROPONIN I   TROPONIN I   B-TYPE NATRIURETIC PEPTIDE   SARS-COV-2 RNA AMPLIFICATION, QUAL        ECG Results              EKG 12-lead (Final result)  Result time 12/28/22 13:11:06      Final result by Interface, Lab In [facility] (12/28/22 13:11:06)                   Narrative:    Test Reason : R06.02    Vent. Rate : 092 BPM     Atrial Rate : 092 BPM     P-R Int :  122 ms          QRS Dur : 092 ms      QT Int : 344 ms       P-R-T Axes : 081 085 080 degrees     QTc Int : 425 ms    Normal sinus rhythm  Biatrial enlargement  Incomplete right bundle branch block  Abnormal ECG  When compared with ECG of 22-DEC-2022 10:52,  Criteria for Septal infarct are no longer Present  Nonspecific T wave abnormality no longer evident in Anterior leads  Confirmed by Stefany Bradley MD ([id number]) on 12/28/2022 1:10:56 PM    Referred By: AMANUEL   SELF           Confirmed By:Stefany Bradley MD                                  Imaging Results              X-Ray Chest AP Portable (Final result)  Result time 12/27/22 19:45:14      Final result by Adi Giron MD (12/27/22 19:45:14)                   Impression:      No acute radiographic abnormality.      Electronically signed by: Aid Giron  Date:    12/27/2022  Time:    19:45               Narrative:    EXAMINATION:  XR CHEST AP PORTABLE    CLINICAL HISTORY:  Chest Pain;    TECHNIQUE:  Single frontal view of the chest was performed.    COMPARISON:  12/22/2022    FINDINGS:  Cardiac silhouette is within normal limits.    No effusion or pneumothorax.  No mass or consolidation.    Mild probable chronic interstitial changes.    No significant change.                                       Medications   sodium chloride 0.9% flush 10 mL (has no administration in time range)   melatonin tablet 6 mg (6 mg Oral Given 12/28/22 0105)   albuterol-ipratropium 2.5 mg-0.5 mg/3 mL nebulizer solution 3 mL (3 mLs Nebulization Given 12/28/22 2337)   methylPREDNISolone sodium succinate injection 125 mg (125 mg Intravenous Given 12/29/22 0007)   amLODIPine tablet 10 mg (10 mg Oral Given 12/28/22 0912)   aspirin chewable tablet 81 mg (81 mg Oral Given 12/28/22 0911)   carvediloL tablet 25 mg (25 mg Oral Given 12/28/22 1821)   gabapentin capsule 300 mg (300 mg Oral Given 12/28/22 2033)   lisinopriL tablet 40 mg (40 mg Oral Given 12/28/22 0912)   atorvastatin tablet  40 mg (40 mg Oral Given 12/28/22 0912)   guaiFENesin 12 hr tablet 1,200 mg (1,200 mg Oral Given 12/28/22 2033)   nicotine 7 mg/24 hr 1 patch (1 patch Transdermal Patch Applied 12/28/22 1107)   DULoxetine DR capsule 20 mg (20 mg Oral Given 12/28/22 1107)   LORazepam tablet 0.5 mg (0.5 mg Oral Given 12/28/22 2033)   albuterol-ipratropium 2.5 mg-0.5 mg/3 mL nebulizer solution 3 mL (3 mLs Nebulization Given 12/27/22 1845)   methylPREDNISolone sodium succinate injection 125 mg (125 mg Intravenous Given 12/27/22 1923)   albuterol nebulizer solution 2.5 mg (2.5 mg Nebulization Given 12/27/22 1925)   albuterol-ipratropium 2.5 mg-0.5 mg/3 mL nebulizer solution 3 mL (3 mLs Nebulization Given 12/27/22 2040)     Medical Decision Making:   Initial Assessment:   In stage COPD now presents with increasing shortness of breath seen in the ER last week for similar symptoms discharge home with antibiotics after breathing treatments.  He has end-stage COPD with underlying lesions in his left long and schedule for bronchoscopy in in the beginning of January.  He is now short of breath with ambulation.  And worsening shortness of breath even at at rest.           ED Course as of 12/29/22 0042   Tue Dec 27, 2022   2022 Will dc w steroids and follow up with pulmonary for scheduled broncho [AB]   2120 Continued short of breath and breathing treatments x 5 and still w wheezing and sat dropping to 90 prior to 1 hr  [AB]   2135 Pt improved and sat now 99 yet he is very anxious and also requiring breathing treatment every 1-2 hrs and w underlying end stage copd will admit for observation  [AB]      ED Course User Index  [AB] Wily Silva MD                 Clinical Impression:   Final diagnoses:  [R07.9] Chest pain  [R06.02] SOB (shortness of breath) (Primary)  [R06.00] Dyspnea, unspecified type  [J44.1] COPD exacerbation        ED Disposition Condition    Observation Stable                Wily Silva MD  12/27/22 1847       Wily HARPER  MD Ricardo  12/29/22 0044

## 2022-12-28 NOTE — PLAN OF CARE
12/28/22 1541   AMAYA Message   Medicare Outpatient and Observation Notification regarding financial responsibility Given to patient/caregiver;Explained to patient/caregiver;Signed/date by patient/caregiver   Date AMAYA was signed 12/28/22   Time AMAYA was signed 0820

## 2022-12-28 NOTE — ED NOTES
Pt transported to 3rd floor via ed stretcher. No acute distress noted. Breaths even and unlabored. Daughter took pt's belongings. Report given to stephania sarmiento

## 2022-12-29 NOTE — ASSESSMENT & PLAN NOTE
Cont home meds lisinopril, coreg and amlodipine   /79    159/67  Likely being pushed up by his steroids.

## 2022-12-29 NOTE — HOSPITAL COURSE
12/29 pt here for COPD exacerbation. Today is day 2 of nebs every 4hr and medrol 125mg IV every 6hr. He remains on 1L NC but just for comfort. POX 95%. Still REED but SOB is better. VSS/afebrile. Seemed anxious yesterday/depressed. Started Cymbalta as well as ativan at night to help with sleep. Slept well.     12/30 pt here for end stage COPD exacerbation- still tight and wheezing yesterday continued 125mg medrol q 6 hr but started 80mg overnight. He does report improved SOB/BANGURA- shorter recovery when getting up to bathroom and coming back. Remains on 1L NC for comfort. Sats 92-96%- does not have home O2. Not as tight but still tight. Coughing but not bringing up much. Feels congested. Ativan at night helping with sleep and tolerating the cymbalta well.     12/31  Much improved breathing this am. He is feeling less dyspneic. He had a bm yesterday. Still some tightness, but cough improved. Afebrile. Tolerated mucormyst well yesterday but bipap gave him a headache.    1/1  Wheezing much improved. He feels much better but is still very dyspneic with movement. Not requiring o2, but will check 6 min walk this am. Has done well with mucormyst/acapella/mucinex. Afebrile. BP elevated yesterday - starting hctz today. Notes he feels like cymbalta is helping.    1/2/23  Looks like back to baseline   Needs bronch 2/9   Needs oxygen to go home

## 2022-12-29 NOTE — ASSESSMENT & PLAN NOTE
start low dose cymbalta for mood as well as chronic neuropathy pain/numbness in legs   add ativan very low dose for sleep as needed (he is very anxious as well)

## 2022-12-29 NOTE — SUBJECTIVE & OBJECTIVE
Past Medical History:   Diagnosis Date    Abnormal CT scan     Arthritis     COPD (chronic obstructive pulmonary disease)     Hypertension        Past Surgical History:   Procedure Laterality Date    COLONOSCOPY      COLONOSCOPY N/A 8/21/2017    Procedure: COLONOSCOPY;  Surgeon: Jessica Fink MD;  Location: [facility];  Service: Endoscopy;  Laterality: N/A;       Review of patient's allergies indicates:  No Known Allergies    No current facility-administered medications on file prior to encounter.     Current Outpatient Medications on File Prior to Encounter   Medication Sig    albuterol (PROVENTIL HFA) 90 mcg/actuation inhaler Inhale 2 puffs into the lungs every 6 (six) hours as needed for Wheezing.    albuterol-ipratropium (DUO-NEB) 2.5 mg-0.5 mg/3 mL nebulizer solution Take 3 mLs by nebulization every 6 (six) hours as needed for Wheezing. Rescue    amLODIPine (NORVASC) 10 MG tablet Take 1 tablet (10 mg total) by mouth once daily.    amoxicillin-clavulanate 875-125mg (AUGMENTIN) 875-125 mg per tablet Take 1 tablet by mouth 2 (two) times daily.    aspirin 81 MG Chew Take 1 tablet (81 mg total) by mouth once daily.    azithromycin (Z-DEEPA) 250 MG tablet Take 1 tablet (250 mg total) by mouth once daily. Take first 2 tablets together, then 1 every day until finished.    carvediloL (COREG) 25 MG tablet Take 1 tablet (25 mg total) by mouth 2 (two) times daily with meals.    gabapentin (NEURONTIN) 300 MG capsule Take 1 capsule (300 mg total) by mouth 3 (three) times daily.    LIPITOR 40 mg tablet Take 1 tablet (40 mg total) by mouth once daily.    lisinopriL (PRINIVIL,ZESTRIL) 20 MG tablet Take 2 tablets (40 mg total) by mouth once daily.    sulfamethoxazole-trimethoprim 800-160mg (BACTRIM DS) 800-160 mg Tab Take 1 tablet by mouth 2 (two) times daily.    traMADoL (ULTRAM) 50 mg tablet Take 1 tablet (50 mg total) by mouth every 8 (eight) hours as needed.    umeclidinium-vilanteroL (ANORO ELLIPTA) 62.5-25  "mcg/actuation DsDv Inhale 1 puff into the lungs once daily. Controller     Family History       Problem Relation (Age of Onset)    Emphysema Mother    Heart failure Father          Tobacco Use    Smoking status: Every Day     Packs/day: 0.50     Years: 37.00     Pack years: 18.50     Types: Cigarettes     Start date: 4/8/1979    Smokeless tobacco: Never    Tobacco comments:      Trying patches    Substance and Sexual Activity    Alcohol use: Yes     Alcohol/week: 0.0 standard drinks     Comment: once a week    Drug use: No    Sexual activity: Not Currently     Review of Systems   Constitutional:  Positive for activity change, chills, diaphoresis and unexpected weight change (15# in last month). Negative for appetite change and fever.   HENT:  Negative for congestion, sinus pressure and sore throat.    Respiratory:  Positive for cough, chest tightness, shortness of breath and wheezing.    Cardiovascular:  Negative for palpitations and leg swelling.        Chest tightness   Gastrointestinal:  Negative for diarrhea, nausea and vomiting.   Genitourinary:  Negative for dysuria, frequency and urgency.   Musculoskeletal:  Positive for arthralgias and myalgias.        Legs feel numb and tingling    Skin:  Negative for rash and wound.   Neurological:  Positive for light-headedness and headaches (better with suppleemntal O2). Negative for dizziness and syncope.   Psychiatric/Behavioral:  Positive for dysphoric mood. Negative for sleep disturbance. The patient is nervous/anxious.         Feels "Grouchy"courtney easily during exam   Objective:     Vital Signs (Most Recent):  Temp: 98 °F (36.7 °C) (12/29/22 0356)  Pulse: 95 (12/29/22 0700)  Resp: 18 (12/29/22 0700)  BP: 135/79 (12/29/22 0356)  SpO2: 95 % (12/29/22 0700)   Vital Signs (24h Range):  Temp:  [96.8 °F (36 °C)-98.2 °F (36.8 °C)] 98 °F (36.7 °C)  Pulse:  [65-95] 95  Resp:  [18-21] 18  SpO2:  [92 %-99 %] 95 %  BP: (103-138)/(58-79) 135/79     Weight: 70.5 kg (155 lb 6.8 " oz)  Body mass index is 22.3 kg/m².    Physical Exam  Constitutional:       Appearance: Normal appearance. He is well-developed. He is not ill-appearing.      Comments: thin   HENT:      Head: Normocephalic and atraumatic.      Right Ear: External ear normal.      Left Ear: External ear normal.      Nose: Nose normal.      Mouth/Throat:      Mouth: Mucous membranes are moist.      Pharynx: No oropharyngeal exudate or posterior oropharyngeal erythema.   Eyes:      General: No scleral icterus.        Right eye: No discharge.         Left eye: No discharge.      Conjunctiva/sclera: Conjunctivae normal.      Pupils: Pupils are equal, round, and reactive to light.   Neck:      Thyroid: No thyromegaly.      Vascular: No JVD.      Trachea: No tracheal deviation.   Cardiovascular:      Rate and Rhythm: Normal rate and regular rhythm.      Heart sounds: Normal heart sounds. No murmur heard.  Pulmonary:      Effort: Pulmonary effort is normal. No respiratory distress.      Breath sounds: Wheezing present. No rales.      Comments: Insp and exp wheeze  Chest:      Chest wall: No tenderness.   Abdominal:      General: Bowel sounds are normal. There is no distension.      Palpations: Abdomen is soft. There is no mass.      Tenderness: There is no abdominal tenderness. There is no guarding or rebound.   Musculoskeletal:         General: Normal range of motion.      Cervical back: Normal range of motion and neck supple.      Right lower leg: No edema.      Left lower leg: No edema.   Lymphadenopathy:      Cervical: No cervical adenopathy.   Skin:     General: Skin is warm and dry.   Neurological:      Mental Status: He is alert and oriented to person, place, and time.      Cranial Nerves: No cranial nerve deficit.      Motor: No abnormal muscle tone.      Coordination: Coordination normal.      Deep Tendon Reflexes: Reflexes are normal and symmetric. Reflexes normal.   Psychiatric:         Behavior: Behavior normal.         Thought  Content: Thought content normal.         Judgment: Judgment normal.      Comments: Tearful          CRANIAL NERVES     CN III, IV, VI   Pupils are equal, round, and reactive to light.     Significant Labs: All pertinent labs within the past 24 hours have been reviewed.  CBC:   Recent Labs   Lab 12/27/22  1837   WBC 13.07*   HGB 15.6   HCT 44.7          CMP:   Recent Labs   Lab 12/27/22  1836      K 4.2      CO2 25   GLU 80   BUN 11   CREATININE 0.7   CALCIUM 9.8   PROT 7.1   ALBUMIN 3.9   BILITOT 0.5   ALKPHOS 79   AST 23   ALT 26   ANIONGAP 11       Cardiac Markers:   Recent Labs   Lab 12/27/22  1837   BNP 27       Recent Labs   Lab 12/27/22  1836   CPK 68   TROPONINI 0.008  0.008       Covid negative   Flu negative     Urinalysis  Recent Labs   Lab 12/28/22  1515   COLORU Yellow   SPECGRAV 1.015   PHUR 7.0   PROTEINUA Negative   NITRITE Negative   LEUKOCYTESUR Negative   UROBILINOGEN Negative         9/8/22 quantiferon GOLD negative     Significant Imaging:     CXR No acute radiographic abnormality    EKG Normal sinus rhythm  Biatrial enlargement  Incomplete right bundle branch block  Abnormal ECG  When compared with ECG of 22-DEC-2022 10:52,  Criteria for Septal infarct are no longer Present  Nonspecific T wave abnormality no longer evident in Anterior leads

## 2022-12-29 NOTE — PLAN OF CARE
Pt alert and oriented X4. Standby assist with ambulation do to SOB on exertion. 1L nasal canula for comfort. Pt receiving breathing tx Q4hr, methylprednisolone IV Q6hr.         Problem: Adult Inpatient Plan of Care  Goal: Plan of Care Review  Outcome: Ongoing, Progressing  Goal: Patient-Specific Goal (Individualized)  Outcome: Ongoing, Progressing  Goal: Absence of Hospital-Acquired Illness or Injury  Outcome: Ongoing, Progressing  Goal: Optimal Comfort and Wellbeing  Outcome: Ongoing, Progressing  Goal: Readiness for Transition of Care  Outcome: Ongoing, Progressing

## 2022-12-29 NOTE — PROGRESS NOTES
[facility] Surg ([facility])  [facility] Medicine  Progress Note    Patient Name: Brando Culp Jr.  MRN: [id number]  Patient Class: IP- Inpatient   Admission Date: 12/27/2022  Length of Stay: 1 days  Attending Physician: Tobi Bahena MD  Primary Care Provider: Dioni Matson MD        Subjective:     Principal Problem:COPD exacerbation        HPI:  63yo male patient with hx of arthritis COPD and HTN presented to ER with c/o SOB. Started last Thursday and has progressed to unable to take more than a few steps due to BANGURA. Pt has end stage COPD and was seen last week in ER and sent home on ABX. Underlying lung lesions scheduled for bronchoscopy int he beginning of next month. Quantiferon gold 9/2022 was negative. He was given 3 stacked nebs as well as medrol 125mg in ER. No fever. Sat % on RA. WBC 26955 (down from 00655 last week). CXR with chronic changes. No acute infiltrates. PCO2 41. PO2 72 and bicarb 29 on RA. Pt was placed in observation for COPD exacerbation for nebs and IV steroids. He reports that he still feels SOB- feels like he hits a brick wall when tryiong to taskr a deep breath in. Denies fever at home but + sweats and chills. Started about a month ago when he had the flu. He is feeling a little better this am from admission     12/2/22>>> ct chest    Progressive central cavitation of the thick wall lesion in the left lower lobe since the prior examination.   Calcified nodules and bronchiectasis in the left upper lobe.   Irregular areas of consolidation in the right middle lobe and left upper lobe which have increased in prominence compared to the prior study.  A PET-CT scan could be obtained for further assessment to evaluate the significance of these findings.   Emphysematous changes in the lungs bilaterally.      Overview/Hospital Course:  12/29 pt here for COPD exacerbation. Today is day 2 of nebs every 4hr and medrol 125mg IV every 6hr. He remains on 1L NC but just for comfort. POX 95%.  Still REED but SOB is better. VSS/afebrile. Seemed anxious yesterday/depressed. Started Cymbalta as well as ativan at night to help with sleep. Slept well.       Past Medical History:   Diagnosis Date    Abnormal CT scan     Arthritis     COPD (chronic obstructive pulmonary disease)     Hypertension        Past Surgical History:   Procedure Laterality Date    COLONOSCOPY      COLONOSCOPY N/A 8/21/2017    Procedure: COLONOSCOPY;  Surgeon: Jessica Fink MD;  Location: [facility];  Service: Endoscopy;  Laterality: N/A;       Review of patient's allergies indicates:  No Known Allergies    No current facility-administered medications on file prior to encounter.     Current Outpatient Medications on File Prior to Encounter   Medication Sig    albuterol (PROVENTIL HFA) 90 mcg/actuation inhaler Inhale 2 puffs into the lungs every 6 (six) hours as needed for Wheezing.    albuterol-ipratropium (DUO-NEB) 2.5 mg-0.5 mg/3 mL nebulizer solution Take 3 mLs by nebulization every 6 (six) hours as needed for Wheezing. Rescue    amLODIPine (NORVASC) 10 MG tablet Take 1 tablet (10 mg total) by mouth once daily.    amoxicillin-clavulanate 875-125mg (AUGMENTIN) 875-125 mg per tablet Take 1 tablet by mouth 2 (two) times daily.    aspirin 81 MG Chew Take 1 tablet (81 mg total) by mouth once daily.    azithromycin (Z-DEEPA) 250 MG tablet Take 1 tablet (250 mg total) by mouth once daily. Take first 2 tablets together, then 1 every day until finished.    carvediloL (COREG) 25 MG tablet Take 1 tablet (25 mg total) by mouth 2 (two) times daily with meals.    gabapentin (NEURONTIN) 300 MG capsule Take 1 capsule (300 mg total) by mouth 3 (three) times daily.    LIPITOR 40 mg tablet Take 1 tablet (40 mg total) by mouth once daily.    lisinopriL (PRINIVIL,ZESTRIL) 20 MG tablet Take 2 tablets (40 mg total) by mouth once daily.    sulfamethoxazole-trimethoprim 800-160mg (BACTRIM DS) 800-160 mg Tab Take 1 tablet by mouth 2 (two)  "times daily.    traMADoL (ULTRAM) 50 mg tablet Take 1 tablet (50 mg total) by mouth every 8 (eight) hours as needed.    umeclidinium-vilanteroL (ANORO ELLIPTA) 62.5-25 mcg/actuation DsDv Inhale 1 puff into the lungs once daily. Controller     Family History       Problem Relation (Age of Onset)    Emphysema Mother    Heart failure Father          Tobacco Use    Smoking status: Every Day     Packs/day: 0.50     Years: 37.00     Pack years: 18.50     Types: Cigarettes     Start date: 4/8/1979    Smokeless tobacco: Never    Tobacco comments:      Trying patches    Substance and Sexual Activity    Alcohol use: Yes     Alcohol/week: 0.0 standard drinks     Comment: once a week    Drug use: No    Sexual activity: Not Currently     Review of Systems   Constitutional:  Positive for activity change, chills, diaphoresis and unexpected weight change (15# in last month). Negative for appetite change and fever.   HENT:  Negative for congestion, sinus pressure and sore throat.    Respiratory:  Positive for cough, chest tightness, shortness of breath and wheezing.    Cardiovascular:  Negative for palpitations and leg swelling.        Chest tightness   Gastrointestinal:  Negative for diarrhea, nausea and vomiting.   Genitourinary:  Negative for dysuria, frequency and urgency.   Musculoskeletal:  Positive for arthralgias and myalgias.        Legs feel numb and tingling    Skin:  Negative for rash and wound.   Neurological:  Positive for light-headedness and headaches (better with suppleemntal O2). Negative for dizziness and syncope.   Psychiatric/Behavioral:  Positive for dysphoric mood. Negative for sleep disturbance. The patient is nervous/anxious.         Feels "Grouchy"courtney easily during exam   Objective:     Vital Signs (Most Recent):  Temp: 98 °F (36.7 °C) (12/29/22 0356)  Pulse: 95 (12/29/22 0700)  Resp: 18 (12/29/22 0700)  BP: 135/79 (12/29/22 0356)  SpO2: 95 % (12/29/22 0700)   Vital Signs (24h Range):  Temp:  [96.8 " °F (36 °C)-98.2 °F (36.8 °C)] 98 °F (36.7 °C)  Pulse:  [65-95] 95  Resp:  [18-21] 18  SpO2:  [92 %-99 %] 95 %  BP: (103-138)/(58-79) 135/79     Weight: 70.5 kg (155 lb 6.8 oz)  Body mass index is 22.3 kg/m².    Physical Exam  Constitutional:       Appearance: Normal appearance. He is well-developed. He is not ill-appearing.      Comments: thin   HENT:      Head: Normocephalic and atraumatic.      Right Ear: External ear normal.      Left Ear: External ear normal.      Nose: Nose normal.      Mouth/Throat:      Mouth: Mucous membranes are moist.      Pharynx: No oropharyngeal exudate or posterior oropharyngeal erythema.   Eyes:      General: No scleral icterus.        Right eye: No discharge.         Left eye: No discharge.      Conjunctiva/sclera: Conjunctivae normal.      Pupils: Pupils are equal, round, and reactive to light.   Neck:      Thyroid: No thyromegaly.      Vascular: No JVD.      Trachea: No tracheal deviation.   Cardiovascular:      Rate and Rhythm: Normal rate and regular rhythm.      Heart sounds: Normal heart sounds. No murmur heard.  Pulmonary:      Effort: Pulmonary effort is normal. No respiratory distress.      Breath sounds: Wheezing present. No rales.      Comments: Insp and exp wheeze  Chest:      Chest wall: No tenderness.   Abdominal:      General: Bowel sounds are normal. There is no distension.      Palpations: Abdomen is soft. There is no mass.      Tenderness: There is no abdominal tenderness. There is no guarding or rebound.   Musculoskeletal:         General: Normal range of motion.      Cervical back: Normal range of motion and neck supple.      Right lower leg: No edema.      Left lower leg: No edema.   Lymphadenopathy:      Cervical: No cervical adenopathy.   Skin:     General: Skin is warm and dry.   Neurological:      Mental Status: He is alert and oriented to person, place, and time.      Cranial Nerves: No cranial nerve deficit.      Motor: No abnormal muscle tone.       Coordination: Coordination normal.      Deep Tendon Reflexes: Reflexes are normal and symmetric. Reflexes normal.   Psychiatric:         Behavior: Behavior normal.         Thought Content: Thought content normal.         Judgment: Judgment normal.      Comments: Tearful          CRANIAL NERVES     CN III, IV, VI   Pupils are equal, round, and reactive to light.     Significant Labs: All pertinent labs within the past 24 hours have been reviewed.  CBC:   Recent Labs   Lab 12/27/22 1837   WBC 13.07*   HGB 15.6   HCT 44.7          CMP:   Recent Labs   Lab 12/27/22 1836      K 4.2      CO2 25   GLU 80   BUN 11   CREATININE 0.7   CALCIUM 9.8   PROT 7.1   ALBUMIN 3.9   BILITOT 0.5   ALKPHOS 79   AST 23   ALT 26   ANIONGAP 11       Cardiac Markers:   Recent Labs   Lab 12/27/22 1837   BNP 27       Recent Labs   Lab 12/27/22 1836   CPK 68   TROPONINI 0.008  0.008       Covid negative   Flu negative     Urinalysis  Recent Labs   Lab 12/28/22  1515   COLORU Yellow   SPECGRAV 1.015   PHUR 7.0   PROTEINUA Negative   NITRITE Negative   LEUKOCYTESUR Negative   UROBILINOGEN Negative         9/8/22 quantiferon GOLD negative     Significant Imaging:     CXR No acute radiographic abnormality    EKG Normal sinus rhythm  Biatrial enlargement  Incomplete right bundle branch block  Abnormal ECG  When compared with ECG of 22-DEC-2022 10:52,  Criteria for Septal infarct are no longer Present  Nonspecific T wave abnormality no longer evident in Anterior leads      Assessment/Plan:      * COPD exacerbation  duonebs every 4hr  Medrol 125mg IV every 6hr> wean today   RA sats %> but was placed on 1L NC for comfort which he says helps him feel not as SOB    Still with trouble speaking in sentences.    Anxiety and depression  start low dose cymbalta for mood as well as chronic neuropathy pain/numbness in legs   add ativan very low dose for sleep as needed (he is very anxious as well)      Bilateral low back pain with  sciatica  Cont home gabapentin       Stage 4 very severe COPD by GOLD classification  Steroids and nebs  F/u pulm outpt as scheduled for bronch 1/9/2023      Essential hypertension  Cont home meds lisinopril, coreg and amlodipine   /79    159/67  Likely being pushed up by his steroids.      VTE Risk Mitigation (From admission, onward)         Ordered     IP VTE HIGH RISK PATIENT  Once         12/27/22 2211     Place sequential compression device  Until discontinued         12/27/22 2211                Discharge Planning   SELVIN:      Code Status: Full Code   Is the patient medically ready for discharge?:     Reason for patient still in hospital (select all that apply): Patient trending condition and Treatment  Discharge Plan A: Home with family                  Rocio Rider MD  Department of Hospital Medicine   [place] - Med Surg (3rd Fl)

## 2022-12-29 NOTE — ASSESSMENT & PLAN NOTE
duonebs every 4hr  Medrol 125mg IV every 6hr> wean today   RA sats %> but was placed on 1L NC for comfort which he says helps him feel not as SOB    Still with trouble speaking in sentences.

## 2022-12-30 NOTE — SUBJECTIVE & OBJECTIVE
Past Medical History:   Diagnosis Date    Abnormal CT scan     Arthritis     COPD (chronic obstructive pulmonary disease)     Hypertension        Past Surgical History:   Procedure Laterality Date    COLONOSCOPY      COLONOSCOPY N/A 8/21/2017    Procedure: COLONOSCOPY;  Surgeon: Jessica Fink MD;  Location: [facility];  Service: Endoscopy;  Laterality: N/A;       Review of patient's allergies indicates:  No Known Allergies    No current facility-administered medications on file prior to encounter.     Current Outpatient Medications on File Prior to Encounter   Medication Sig    albuterol (PROVENTIL HFA) 90 mcg/actuation inhaler Inhale 2 puffs into the lungs every 6 (six) hours as needed for Wheezing.    albuterol-ipratropium (DUO-NEB) 2.5 mg-0.5 mg/3 mL nebulizer solution Take 3 mLs by nebulization every 6 (six) hours as needed for Wheezing. Rescue    amLODIPine (NORVASC) 10 MG tablet Take 1 tablet (10 mg total) by mouth once daily.    amoxicillin-clavulanate 875-125mg (AUGMENTIN) 875-125 mg per tablet Take 1 tablet by mouth 2 (two) times daily.    aspirin 81 MG Chew Take 1 tablet (81 mg total) by mouth once daily.    azithromycin (Z-DEEPA) 250 MG tablet Take 1 tablet (250 mg total) by mouth once daily. Take first 2 tablets together, then 1 every day until finished.    carvediloL (COREG) 25 MG tablet Take 1 tablet (25 mg total) by mouth 2 (two) times daily with meals.    gabapentin (NEURONTIN) 300 MG capsule Take 1 capsule (300 mg total) by mouth 3 (three) times daily.    LIPITOR 40 mg tablet Take 1 tablet (40 mg total) by mouth once daily.    lisinopriL (PRINIVIL,ZESTRIL) 20 MG tablet Take 2 tablets (40 mg total) by mouth once daily.    sulfamethoxazole-trimethoprim 800-160mg (BACTRIM DS) 800-160 mg Tab Take 1 tablet by mouth 2 (two) times daily.    traMADoL (ULTRAM) 50 mg tablet Take 1 tablet (50 mg total) by mouth every 8 (eight) hours as needed.    umeclidinium-vilanteroL (ANORO ELLIPTA) 62.5-25  "mcg/actuation DsDv Inhale 1 puff into the lungs once daily. Controller     Family History       Problem Relation (Age of Onset)    Emphysema Mother    Heart failure Father          Tobacco Use    Smoking status: Every Day     Packs/day: 0.50     Years: 37.00     Pack years: 18.50     Types: Cigarettes     Start date: 4/8/1979    Smokeless tobacco: Never    Tobacco comments:      Trying patches    Substance and Sexual Activity    Alcohol use: Yes     Alcohol/week: 0.0 standard drinks     Comment: once a week    Drug use: No    Sexual activity: Not Currently     Review of Systems   Constitutional:  Positive for unexpected weight change (15# in last month). Negative for activity change, appetite change, chills, diaphoresis and fever.   HENT:  Negative for congestion, sinus pressure and sore throat.    Respiratory:  Positive for cough, shortness of breath and wheezing. Negative for chest tightness.    Cardiovascular:  Negative for palpitations and leg swelling.        Chest tightness   Gastrointestinal:  Positive for abdominal pain. Negative for diarrhea, nausea and vomiting.   Genitourinary:  Negative for dysuria, frequency and urgency.   Musculoskeletal:  Positive for arthralgias and myalgias.        Legs feel numb and tingling    Skin:  Negative for rash and wound.   Neurological:  Negative for dizziness, syncope, light-headedness and headaches (better with suppleemntal O2).   Psychiatric/Behavioral:  Negative for dysphoric mood and sleep disturbance. The patient is not nervous/anxious.         Feels "Grouchy"courtney easily during exam   Objective:     Vital Signs (Most Recent):  Temp: 97.9 °F (36.6 °C) (12/30/22 0713)  Pulse: 69 (12/30/22 0713)  Resp: 16 (12/30/22 0713)  BP: 136/76 (12/30/22 0713)  SpO2: 96 % (12/30/22 0713)   Vital Signs (24h Range):  Temp:  [97.5 °F (36.4 °C)-98.1 °F (36.7 °C)] 97.9 °F (36.6 °C)  Pulse:  [65-83] 69  Resp:  [16-26] 16  SpO2:  [92 %-100 %] 96 %  BP: (129-185)/(67-81) 136/76     Weight: " 67.3 kg (148 lb 5.9 oz)  Body mass index is 21.29 kg/m².    Physical Exam  Constitutional:       Appearance: Normal appearance. He is well-developed. He is not ill-appearing.      Comments: thin   HENT:      Head: Normocephalic and atraumatic.      Right Ear: External ear normal.      Left Ear: External ear normal.      Nose: Nose normal.      Mouth/Throat:      Mouth: Mucous membranes are moist.      Pharynx: No oropharyngeal exudate or posterior oropharyngeal erythema.   Eyes:      General: No scleral icterus.        Right eye: No discharge.         Left eye: No discharge.      Conjunctiva/sclera: Conjunctivae normal.      Pupils: Pupils are equal, round, and reactive to light.   Neck:      Thyroid: No thyromegaly.      Vascular: No JVD.      Trachea: No tracheal deviation.   Cardiovascular:      Rate and Rhythm: Normal rate and regular rhythm.      Heart sounds: Normal heart sounds. No murmur heard.  Pulmonary:      Effort: Pulmonary effort is normal. No respiratory distress.      Breath sounds: Wheezing present. No rales.      Comments: Insp and exp wheeze    Improved dyspnea, slight supraclav retractions  Chest:      Chest wall: No tenderness.   Abdominal:      General: Bowel sounds are normal. There is no distension.      Palpations: Abdomen is soft. There is no mass.      Tenderness: There is no abdominal tenderness. There is no guarding or rebound.   Musculoskeletal:         General: Normal range of motion.      Cervical back: Normal range of motion and neck supple.      Right lower leg: No edema.      Left lower leg: No edema.   Lymphadenopathy:      Cervical: No cervical adenopathy.   Skin:     General: Skin is warm and dry.   Neurological:      Mental Status: He is alert and oriented to person, place, and time.      Cranial Nerves: No cranial nerve deficit.      Motor: No abnormal muscle tone.      Coordination: Coordination normal.      Deep Tendon Reflexes: Reflexes are normal and symmetric. Reflexes  normal.   Psychiatric:         Behavior: Behavior normal.         Thought Content: Thought content normal.         Judgment: Judgment normal.      Comments: Tearful          CRANIAL NERVES     CN III, IV, VI   Pupils are equal, round, and reactive to light.     Significant Labs: All pertinent labs within the past 24 hours have been reviewed.  Lab Results   Component Value Date    WBC 13.07 (H) 12/27/2022    HGB 15.6 12/27/2022    HCT 44.7 12/27/2022    MCV 96 12/27/2022     12/27/2022       BMP  Lab Results   Component Value Date     12/27/2022    K 4.2 12/27/2022     12/27/2022    CO2 25 12/27/2022    BUN 11 12/27/2022    CREATININE 0.7 12/27/2022    CALCIUM 9.8 12/27/2022    ANIONGAP 11 12/27/2022    EGFRNORACEVR >60 12/27/2022     Lab Results   Component Value Date    ALT 26 12/27/2022    AST 23 12/27/2022    ALKPHOS 79 12/27/2022    BILITOT 0.5 12/27/2022   BNP  Recent Labs   Lab 12/27/22  1837   BNP 27         Recent Labs   Lab 12/27/22  1836   CPK 68   TROPONINI 0.008  0.008       Covid negative   Flu negative     Urinalysis  Recent Labs   Lab 12/28/22  1515   COLORU Yellow   SPECGRAV 1.015   PHUR 7.0   PROTEINUA Negative   NITRITE Negative   LEUKOCYTESUR Negative   UROBILINOGEN Negative          9/8/22 quantiferon GOLD negative     Significant Imaging:     CXR No acute radiographic abnormality    EKG Normal sinus rhythm  Biatrial enlargement  Incomplete right bundle branch block  Abnormal ECG  When compared with ECG of 22-DEC-2022 10:52,  Criteria for Septal infarct are no longer Present  Nonspecific T wave abnormality no longer evident in Anterior leads

## 2022-12-30 NOTE — PLAN OF CARE
Pt alert and oriented X4. Pt up to bathroom with standby assist to insure safety due to sob on exertion. Pt does report sob is improving with shorter recovery time after ambulation. Receiving methylprednisolone Q6 throughout shift but will decrease dose and frequency tonight. Pt remains on 1L nasal canula for comfort with oxygen sats 92-93% on finger probe and 99% on ear. (NO home Oxygen) Breathing TX's Q 4hrs. Coarse wheezes throughout. Nicotine patch on right upper arm and pt reports no cravings so far. Smoking cessation provided. Home med Tramadol added to regimen for chronic back pain. One dose administered on shift.   Pt is normal sinus with PVC's PAC's on tele.     Problem: Adult Inpatient Plan of Care  Goal: Plan of Care Review  12/29/2022 1824 by Oumou Morales RN  Outcome: Ongoing, Progressing  12/29/2022 1555 by Oumou Morales RN  Outcome: Ongoing, Progressing  Goal: Patient-Specific Goal (Individualized)  12/29/2022 1824 by Oumou Morales RN  Outcome: Ongoing, Progressing  12/29/2022 1555 by Oumou Morales RN  Outcome: Ongoing, Progressing  Goal: Absence of Hospital-Acquired Illness or Injury  12/29/2022 1824 by Oumou Morales RN  Outcome: Ongoing, Progressing  12/29/2022 1555 by Oumou Morales RN  Outcome: Ongoing, Progressing  Goal: Optimal Comfort and Wellbeing  12/29/2022 1824 by Oumou Morales RN  Outcome: Ongoing, Progressing  12/29/2022 1555 by Oumou Morales RN  Outcome: Ongoing, Progressing  Goal: Readiness for Transition of Care  12/29/2022 1824 by Oumou Morales RN  Outcome: Ongoing, Progressing  12/29/2022 1555 by Oumou Morales RN  Outcome: Ongoing, Progressing     Problem: Adjustment to Illness COPD (Chronic Obstructive Pulmonary Disease)  Goal: Optimal Chronic Illness Coping  Outcome: Ongoing, Progressing     Problem: Functional Ability Impaired COPD (Chronic Obstructive Pulmonary Disease)  Goal: Optimal Level  of Functional [place]  Outcome: Ongoing, Progressing     Problem: Infection COPD (Chronic Obstructive Pulmonary Disease)  Goal: Absence of Infection Signs and Symptoms  Outcome: Ongoing, Progressing     Problem: Oral Intake Inadequate COPD (Chronic Obstructive Pulmonary Disease)  Goal: Improved Nutrition Intake  Outcome: Ongoing, Progressing     Problem: Respiratory Compromise COPD (Chronic Obstructive Pulmonary Disease)  Goal: Effective Oxygenation and Ventilation  Outcome: Ongoing, Progressing

## 2022-12-30 NOTE — ASSESSMENT & PLAN NOTE
duonebs every 4hr  Medrol 125mg IV every 6hr> weaned last night to 80mg IV every 8hr  RA sats %> but was placed on 1L NC for comfort which he says helps him feel not as SOB    Still with trouble speaking in sentences. He does report shorter recovery time with BANGURA, still feels tight  Will try bipap today if he can tolerate it  mucormyst x 1.

## 2022-12-30 NOTE — ASSESSMENT & PLAN NOTE
Cont home meds lisinopril, coreg and amlodipine   /79    136//81  Likely being pushed up by his steroids.    Will not adjust meds.

## 2022-12-30 NOTE — PLAN OF CARE
Back pain treated with tramadol.   SpO2 with 1L of oxygen is 91-92% on the finger probe, but 96% on his ear.         Problem: Respiratory Compromise COPD (Chronic Obstructive Pulmonary Disease)  Goal: Effective Oxygenation and Ventilation  Outcome: Ongoing, Progressing     Problem: Infection COPD (Chronic Obstructive Pulmonary Disease)  Goal: Absence of Infection Signs and Symptoms  Outcome: Ongoing, Progressing     Problem: Functional Ability Impaired COPD (Chronic Obstructive Pulmonary Disease)  Goal: Optimal Level of Functional [place]  Outcome: Ongoing, Progressing

## 2022-12-30 NOTE — ASSESSMENT & PLAN NOTE
start low dose cymbalta for mood as well as chronic neuropathy pain/numbness in legs   add ativan very low dose for sleep as needed (he is very anxious as well)  Tolerating cymbalta well and ativan is helping him rest well

## 2022-12-30 NOTE — PROGRESS NOTES
[place] [facility] ([facility])  [facility] Medicine  Progress Note    Patient Name: Brando Culp Jr.  MRN: [id number]  Patient Class: IP- Inpatient   Admission Date: 12/27/2022  Length of Stay: 2 days  Attending Physician: Tobi Bahena MD  Primary Care Provider: Dioni Matson MD        Subjective:     Principal Problem:COPD exacerbation        HPI:  61yo male patient with hx of arthritis COPD and HTN presented to ER with c/o SOB. Started last Thursday and has progressed to unable to take more than a few steps due to BANGURA. Pt has end stage COPD and was seen last week in ER and sent home on ABX. Underlying lung lesions scheduled for bronchoscopy int he beginning of next month. Quantiferon gold 9/2022 was negative. He was given 3 stacked nebs as well as medrol 125mg in ER. No fever. Sat % on RA. WBC 32926 (down from 42731 last week). CXR with chronic changes. No acute infiltrates. PCO2 41. PO2 72 and bicarb 29 on RA. Pt was placed in observation for COPD exacerbation for nebs and IV steroids. He reports that he still feels SOB- feels like he hits a brick wall when tryiong to taskr a deep breath in. Denies fever at home but + sweats and chills. Started about a month ago when he had the flu. He is feeling a little better this am from admission     12/2/22>>> ct chest    Progressive central cavitation of the thick wall lesion in the left lower lobe since the prior examination.   Calcified nodules and bronchiectasis in the left upper lobe.   Irregular areas of consolidation in the right middle lobe and left upper lobe which have increased in prominence compared to the prior study.  A PET-CT scan could be obtained for further assessment to evaluate the significance of these findings.   Emphysematous changes in the lungs bilaterally.      Overview/Hospital Course:  12/29 pt here for COPD exacerbation. Today is day 2 of nebs every 4hr and medrol 125mg IV every 6hr. He remains on 1L NC but just for comfort. POX 95%.  Still REED but SOB is better. VSS/afebrile. Seemed anxious yesterday/depressed. Started Cymbalta as well as ativan at night to help with sleep. Slept well.     12/30 pt here for end stage COPD exacerbation- still tight and wheezing yesterday continued 125mg medrol q 6 hr but started 80mg overnight. He does report improved SOB/BANGURA- shorter recovery when getting up to bathroom and coming back. Remains on 1L NC for comfort. Sats 92-96%- does not have home O2. Not as tight but still tight. Coughing but not bringing up much. Feels congested. Ativan at night helping with sleep and tolerating the cymbalta well.       Past Medical History:   Diagnosis Date    Abnormal CT scan     Arthritis     COPD (chronic obstructive pulmonary disease)     Hypertension        Past Surgical History:   Procedure Laterality Date    COLONOSCOPY      COLONOSCOPY N/A 8/21/2017    Procedure: COLONOSCOPY;  Surgeon: Jessica Fink MD;  [facility];  Service: Endoscopy;  Laterality: N/A;       Review of patient's allergies indicates:  No Known Allergies    No current facility-administered medications on file prior to encounter.     Current Outpatient Medications on File Prior to Encounter   Medication Sig    albuterol (PROVENTIL HFA) 90 mcg/actuation inhaler Inhale 2 puffs into the lungs every 6 (six) hours as needed for Wheezing.    albuterol-ipratropium (DUO-NEB) 2.5 mg-0.5 mg/3 mL nebulizer solution Take 3 mLs by nebulization every 6 (six) hours as needed for Wheezing. Rescue    amLODIPine (NORVASC) 10 MG tablet Take 1 tablet (10 mg total) by mouth once daily.    amoxicillin-clavulanate 875-125mg (AUGMENTIN) 875-125 mg per tablet Take 1 tablet by mouth 2 (two) times daily.    aspirin 81 MG Chew Take 1 tablet (81 mg total) by mouth once daily.    azithromycin (Z-DEEPA) 250 MG tablet Take 1 tablet (250 mg total) by mouth once daily. Take first 2 tablets together, then 1 every day until finished.    carvediloL (COREG) 25  MG tablet Take 1 tablet (25 mg total) by mouth 2 (two) times daily with meals.    gabapentin (NEURONTIN) 300 MG capsule Take 1 capsule (300 mg total) by mouth 3 (three) times daily.    LIPITOR 40 mg tablet Take 1 tablet (40 mg total) by mouth once daily.    lisinopriL (PRINIVIL,ZESTRIL) 20 MG tablet Take 2 tablets (40 mg total) by mouth once daily.    sulfamethoxazole-trimethoprim 800-160mg (BACTRIM DS) 800-160 mg Tab Take 1 tablet by mouth 2 (two) times daily.    traMADoL (ULTRAM) 50 mg tablet Take 1 tablet (50 mg total) by mouth every 8 (eight) hours as needed.    umeclidinium-vilanteroL (ANORO ELLIPTA) 62.5-25 mcg/actuation DsDv Inhale 1 puff into the lungs once daily. Controller     Family History       Problem Relation (Age of Onset)    Emphysema Mother    Heart failure Father          Tobacco Use    Smoking status: Every Day     Packs/day: 0.50     Years: 37.00     Pack years: 18.50     Types: Cigarettes     Start date: 4/8/1979    Smokeless tobacco: Never    Tobacco comments:      Trying patches    Substance and Sexual Activity    Alcohol use: Yes     Alcohol/week: 0.0 standard drinks     Comment: once a week    Drug use: No    Sexual activity: Not Currently     Review of Systems   Constitutional:  Positive for unexpected weight change (15# in last month). Negative for activity change, appetite change, chills, diaphoresis and fever.   HENT:  Negative for congestion, sinus pressure and sore throat.    Respiratory:  Positive for cough, shortness of breath and wheezing. Negative for chest tightness.    Cardiovascular:  Negative for palpitations and leg swelling.        Chest tightness   Gastrointestinal:  Positive for abdominal pain. Negative for diarrhea, nausea and vomiting.   Genitourinary:  Negative for dysuria, frequency and urgency.   Musculoskeletal:  Positive for arthralgias and myalgias.        Legs feel numb and tingling    Skin:  Negative for rash and wound.   Neurological:  Negative for  "dizziness, syncope, light-headedness and headaches (better with suppleemntal O2).   Psychiatric/Behavioral:  Negative for dysphoric mood and sleep disturbance. The patient is not nervous/anxious.         Feels "Grouchy"courtney easily during exam   Objective:     Vital Signs (Most Recent):  Temp: 97.9 °F (36.6 °C) (12/30/22 0713)  Pulse: 69 (12/30/22 0713)  Resp: 16 (12/30/22 0713)  BP: 136/76 (12/30/22 0713)  SpO2: 96 % (12/30/22 0713)   Vital Signs (24h Range):  Temp:  [97.5 °F (36.4 °C)-98.1 °F (36.7 °C)] 97.9 °F (36.6 °C)  Pulse:  [65-83] 69  Resp:  [16-26] 16  SpO2:  [92 %-100 %] 96 %  BP: (129-185)/(67-81) 136/76     Weight: 67.3 kg (148 lb 5.9 oz)  Body mass index is 21.29 kg/m².    Physical Exam  Constitutional:       Appearance: Normal appearance. He is well-developed. He is not ill-appearing.      Comments: thin   HENT:      Head: Normocephalic and atraumatic.      Right Ear: External ear normal.      Left Ear: External ear normal.      Nose: Nose normal.      Mouth/Throat:      Mouth: Mucous membranes are moist.      Pharynx: No oropharyngeal exudate or posterior oropharyngeal erythema.   Eyes:      General: No scleral icterus.        Right eye: No discharge.         Left eye: No discharge.      Conjunctiva/sclera: Conjunctivae normal.      Pupils: Pupils are equal, round, and reactive to light.   Neck:      Thyroid: No thyromegaly.      Vascular: No JVD.      Trachea: No tracheal deviation.   Cardiovascular:      Rate and Rhythm: Normal rate and regular rhythm.      Heart sounds: Normal heart sounds. No murmur heard.  Pulmonary:      Effort: Pulmonary effort is normal. No respiratory distress.      Breath sounds: Wheezing present. No rales.      Comments: Insp and exp wheeze    Improved dyspnea, slight supraclav retractions  Chest:      Chest wall: No tenderness.   Abdominal:      General: Bowel sounds are normal. There is no distension.      Palpations: Abdomen is soft. There is no mass.      Tenderness: " There is no abdominal tenderness. There is no guarding or rebound.   Musculoskeletal:         General: Normal range of motion.      Cervical back: Normal range of motion and neck supple.      Right lower leg: No edema.      Left lower leg: No edema.   Lymphadenopathy:      Cervical: No cervical adenopathy.   Skin:     General: Skin is warm and dry.   Neurological:      Mental Status: He is alert and oriented to person, place, and time.      Cranial Nerves: No cranial nerve deficit.      Motor: No abnormal muscle tone.      Coordination: Coordination normal.      Deep Tendon Reflexes: Reflexes are normal and symmetric. Reflexes normal.   Psychiatric:         Behavior: Behavior normal.         Thought Content: Thought content normal.         Judgment: Judgment normal.      Comments: Tearful          CRANIAL NERVES     CN III, IV, VI   Pupils are equal, round, and reactive to light.     Significant Labs: All pertinent labs within the past 24 hours have been reviewed.  Lab Results   Component Value Date    WBC 13.07 (H) 12/27/2022    HGB 15.6 12/27/2022    HCT 44.7 12/27/2022    MCV 96 12/27/2022     12/27/2022       BMP  Lab Results   Component Value Date     12/27/2022    K 4.2 12/27/2022     12/27/2022    CO2 25 12/27/2022    BUN 11 12/27/2022    CREATININE 0.7 12/27/2022    CALCIUM 9.8 12/27/2022    ANIONGAP 11 12/27/2022    EGFRNORACEVR >60 12/27/2022     Lab Results   Component Value Date    ALT 26 12/27/2022    AST 23 12/27/2022    ALKPHOS 79 12/27/2022    BILITOT 0.5 12/27/2022   BNP  Recent Labs   Lab 12/27/22  1837   BNP 27         Recent Labs   Lab 12/27/22  1836   CPK 68   TROPONINI 0.008  0.008       Covid negative   Flu negative     Urinalysis  Recent Labs   Lab 12/28/22  1515   COLORU Yellow   SPECGRAV 1.015   PHUR 7.0   PROTEINUA Negative   NITRITE Negative   LEUKOCYTESUR Negative   UROBILINOGEN Negative          9/8/22 quantiferon GOLD negative     Significant Imaging:     CXR No  acute radiographic abnormality    EKG Normal sinus rhythm  Biatrial enlargement  Incomplete right bundle branch block  Abnormal ECG  When compared with ECG of 22-DEC-2022 10:52,  Criteria for Septal infarct are no longer Present  Nonspecific T wave abnormality no longer evident in Anterior leads      Assessment/Plan:      * COPD exacerbation  duonebs every 4hr  Medrol 125mg IV every 6hr> weaned last night to 80mg IV every 8hr  RA sats %> but was placed on 1L NC for comfort which he says helps him feel not as SOB    Still with trouble speaking in sentences. He does report shorter recovery time with BANGURA, still feels tight  Will try bipap today if he can tolerate it  mucormyst x 1.     Anxiety and depression  start low dose cymbalta for mood as well as chronic neuropathy pain/numbness in legs   add ativan very low dose for sleep as needed (he is very anxious as well)  Tolerating cymbalta well and ativan is helping him rest well     Bilateral low back pain with sciatica  Cont home gabapentin       Stage 4 very severe COPD by GOLD classification  Steroids and nebs  F/u pulm outpt as scheduled for bronch 1/9/2023      Essential hypertension  Cont home meds lisinopril, coreg and amlodipine   /79    136//81  Likely being pushed up by his steroids.    Will not adjust meds.      VTE Risk Mitigation (From admission, onward)         Ordered     IP VTE HIGH RISK PATIENT  Once         12/27/22 2211     Place sequential compression device  Until discontinued         12/27/22 2211                Discharge Planning   SELVIN:      Code Status: Full Code   Is the patient medically ready for discharge?:     Reason for patient still in hospital (select all that apply): Patient trending condition and Treatment  Discharge Plan A: Home with family                  Rocio Rider MD  Department of Hospital Medicine   [place] [facility] (3rd Fl)

## 2022-12-31 NOTE — SUBJECTIVE & OBJECTIVE
"Past Medical History:   Diagnosis Date    Abnormal CT scan     Arthritis     COPD (chronic obstructive pulmonary disease)     Hypertension        Past Surgical History:   Procedure Laterality Date    COLONOSCOPY      COLONOSCOPY N/A 8/21/2017    Procedure: COLONOSCOPY;  Surgeon: Jessica Fink MD;  Location: [facility];  Service: Endoscopy;  Laterality: N/A;           Review of Systems   Constitutional:  Positive for unexpected weight change (15# in last month). Negative for activity change, appetite change, chills, diaphoresis and fever.   HENT:  Negative for congestion, sinus pressure and sore throat.    Respiratory:  Positive for cough. Negative for chest tightness, shortness of breath and wheezing.    Cardiovascular:  Negative for palpitations and leg swelling.        Chest tightness   Gastrointestinal:  Negative for abdominal pain, diarrhea, nausea and vomiting.   Genitourinary:  Negative for dysuria, frequency and urgency.   Musculoskeletal:  Positive for arthralgias and myalgias.        Legs feel numb and tingling    Skin:  Negative for rash and wound.   Neurological:  Positive for headaches (better with suppleemntal O2). Negative for dizziness, syncope and light-headedness.   Psychiatric/Behavioral:  Negative for dysphoric mood and sleep disturbance. The patient is not nervous/anxious.         Feels "Grouchy"courtney easily during exam   Objective:     Vital Signs (Most Recent):  Temp: 98.1 °F (36.7 °C) (12/31/22 0717)  Pulse: 64 (12/31/22 1000)  Resp: 18 (12/31/22 0722)  BP: (!) 163/89 (12/31/22 0810)  SpO2: (!) 94 % (12/31/22 0722)   Vital Signs (24h Range):  Temp:  [96.3 °F (35.7 °C)-99 °F (37.2 °C)] 98.1 °F (36.7 °C)  Pulse:  [62-80] 64  Resp:  [16-20] 18  SpO2:  [90 %-96 %] 94 %  BP: (114-188)/(64-93) 163/89     Weight: 67.3 kg (148 lb 5.9 oz)  Body mass index is 21.29 kg/m².    Physical Exam  Constitutional:       Appearance: Normal appearance. He is well-developed. He is not ill-appearing.      " Comments: thin   HENT:      Head: Normocephalic and atraumatic.      Right Ear: External ear normal.      Left Ear: External ear normal.      Nose: Nose normal.      Mouth/Throat:      Mouth: Mucous membranes are moist.      Pharynx: No oropharyngeal exudate or posterior oropharyngeal erythema.   Eyes:      General: No scleral icterus.        Right eye: No discharge.         Left eye: No discharge.      Conjunctiva/sclera: Conjunctivae normal.      Pupils: Pupils are equal, round, and reactive to light.   Neck:      Thyroid: No thyromegaly.      Vascular: No JVD.      Trachea: No tracheal deviation.   Cardiovascular:      Rate and Rhythm: Normal rate and regular rhythm.      Heart sounds: Normal heart sounds. No murmur heard.  Pulmonary:      Effort: Pulmonary effort is normal. No respiratory distress.      Breath sounds: No wheezing or rales.      Comments: Tight, but NO wheezing this am.  Slight supraclavicular retractions but overall MUCH less dyspneic  Chest:      Chest wall: No tenderness.   Abdominal:      General: Bowel sounds are normal. There is no distension.      Palpations: Abdomen is soft. There is no mass.      Tenderness: There is no abdominal tenderness. There is no guarding or rebound.   Musculoskeletal:         General: Normal range of motion.      Cervical back: Normal range of motion and neck supple.      Right lower leg: No edema.      Left lower leg: No edema.   Lymphadenopathy:      Cervical: No cervical adenopathy.   Skin:     General: Skin is warm and dry.   Neurological:      Mental Status: He is alert and oriented to person, place, and time.      Cranial Nerves: No cranial nerve deficit.      Motor: No abnormal muscle tone.      Coordination: Coordination normal.      Deep Tendon Reflexes: Reflexes are normal and symmetric. Reflexes normal.   Psychiatric:         Behavior: Behavior normal.         Thought Content: Thought content normal.         Judgment: Judgment normal.      Comments:  Improved disposition         CRANIAL NERVES     CN III, IV, VI   Pupils are equal, round, and reactive to light.     Significant Labs: All pertinent labs within the past 24 hours have been reviewed.  Lab Results   Component Value Date    WBC 13.07 (H) 12/27/2022    HGB 15.6 12/27/2022    HCT 44.7 12/27/2022    MCV 96 12/27/2022     12/27/2022       BMP  Lab Results   Component Value Date     12/27/2022    K 4.2 12/27/2022     12/27/2022    CO2 25 12/27/2022    BUN 11 12/27/2022    CREATININE 0.7 12/27/2022    CALCIUM 9.8 12/27/2022    ANIONGAP 11 12/27/2022    EGFRNORACEVR >60 12/27/2022     Lab Results   Component Value Date    ALT 26 12/27/2022    AST 23 12/27/2022    ALKPHOS 79 12/27/2022    BILITOT 0.5 12/27/2022   BNP  Recent Labs   Lab 12/27/22  1837   BNP 27           Recent Labs   Lab 12/27/22  1836   CPK 68   TROPONINI 0.008  0.008       Covid negative   Flu negative     Urinalysis  Recent Labs   Lab 12/28/22  1515   COLORU Yellow   SPECGRAV 1.015   PHUR 7.0   PROTEINUA Negative   NITRITE Negative   LEUKOCYTESUR Negative   UROBILINOGEN Negative          9/8/22 quantiferon GOLD negative     Significant Imaging:     CXR No acute radiographic abnormality    EKG Normal sinus rhythm  Biatrial enlargement  Incomplete right bundle branch block  Abnormal ECG  When compared with ECG of 22-DEC-2022 10:52,  Criteria for Septal infarct are no longer Present  Nonspecific T wave abnormality no longer evident in Anterior leads

## 2022-12-31 NOTE — PLAN OF CARE
Back pain treated with tramadol.   SpO2 with 1L of oxygen is 93-98%.      Problem: Respiratory Compromise COPD (Chronic Obstructive Pulmonary Disease)  Goal: Effective Oxygenation and Ventilation  Outcome: Ongoing, Progressing     Problem: Oral Intake Inadequate COPD (Chronic Obstructive Pulmonary Disease)  Goal: Improved Nutrition Intake  Outcome: Ongoing, Progressing     Problem: Infection COPD (Chronic Obstructive Pulmonary Disease)  Goal: Absence of Infection Signs and Symptoms  Outcome: Ongoing, Progressing

## 2022-12-31 NOTE — ASSESSMENT & PLAN NOTE
duonebs every 4hr  Medrol 125mg IV every 6hr> weaned last night to 80mg IV every 8hr  RA sats %> but was placed on 1L NC for comfort which he says helps him feel not as SOB    Still with trouble speaking in sentences. He does report shorter recovery time with BANGURA, still feels tight  Will try bipap today if he can tolerate it  mucormyst x 1.     12/31  Much improved resp exam. Still dyspneic with exertion.  No o2 requirement. Getting a lot of steroid at this point, so will wean and anticipate possible d/c home tomorrow.

## 2022-12-31 NOTE — ASSESSMENT & PLAN NOTE
Cont home meds lisinopril, coreg and amlodipine   /79    136//81  Likely being pushed up by his steroids.    Will not adjust meds.    Bp remains slightly elevated, again likely steroid driven. However, will give a small dose of hctz this am.  Denies any issues or pain.

## 2022-12-31 NOTE — ASSESSMENT & PLAN NOTE
Steroids and nebs  F/u pulm outpt as scheduled for bronch 1/9/2023 12/31  Tolerated mucormyst very well. Repeat dose today.  Can taper steroid.

## 2022-12-31 NOTE — PROGRESS NOTES
[place] [facility] ([facility])  [facility] Medicine  Progress Note    Patient Name: Brando Culp Jr.  MRN: [id number]  Patient Class: IP- Inpatient   Admission Date: 12/27/2022  Length of Stay: 3 days  Attending Physician: Tobi Bahena MD  Primary Care Provider: Dioni Matson MD        Subjective:     Principal Problem:COPD exacerbation        HPI:  61yo male patient with hx of arthritis COPD and HTN presented to ER with c/o SOB. Started last Thursday and has progressed to unable to take more than a few steps due to BANGURA. Pt has end stage COPD and was seen last week in ER and sent home on ABX. Underlying lung lesions scheduled for bronchoscopy int he beginning of next month. Quantiferon gold 9/2022 was negative. He was given 3 stacked nebs as well as medrol 125mg in ER. No fever. Sat % on RA. WBC 26250 (down from 28549 last week). CXR with chronic changes. No acute infiltrates. PCO2 41. PO2 72 and bicarb 29 on RA. Pt was placed in observation for COPD exacerbation for nebs and IV steroids. He reports that he still feels SOB- feels like he hits a brick wall when tryiong to taskr a deep breath in. Denies fever at home but + sweats and chills. Started about a month ago when he had the flu. He is feeling a little better this am from admission     12/2/22>>> ct chest    Progressive central cavitation of the thick wall lesion in the left lower lobe since the prior examination.   Calcified nodules and bronchiectasis in the left upper lobe.   Irregular areas of consolidation in the right middle lobe and left upper lobe which have increased in prominence compared to the prior study.  A PET-CT scan could be obtained for further assessment to evaluate the significance of these findings.   Emphysematous changes in the lungs bilaterally.      Overview/Hospital Course:  12/29 pt here for COPD exacerbation. Today is day 2 of nebs every 4hr and medrol 125mg IV every 6hr. He remains on 1L NC but just for comfort. POX 95%.  Still REED but SOB is better. VSS/afebrile. Seemed anxious yesterday/depressed. Started Cymbalta as well as ativan at night to help with sleep. Slept well.     12/30 pt here for end stage COPD exacerbation- still tight and wheezing yesterday continued 125mg medrol q 6 hr but started 80mg overnight. He does report improved SOB/BANGURA- shorter recovery when getting up to bathroom and coming back. Remains on 1L NC for comfort. Sats 92-96%- does not have home O2. Not as tight but still tight. Coughing but not bringing up much. Feels congested. Ativan at night helping with sleep and tolerating the cymbalta well.     12/31  Much improved breathing this am. He is feeling less dyspneic. He had a bm yesterday. Still some tightness, but cough improved. Afebrile. Tolerated mucormyst well yesterday but bipap gave him a headache.      Past Medical History:   Diagnosis Date    Abnormal CT scan     Arthritis     COPD (chronic obstructive pulmonary disease)     Hypertension        Past Surgical History:   Procedure Laterality Date    COLONOSCOPY      COLONOSCOPY N/A 8/21/2017    Procedure: COLONOSCOPY;  Surgeon: Jessica Fink MD;  Location: [facility];  Service: Endoscopy;  Laterality: N/A;           Review of Systems   Constitutional:  Positive for unexpected weight change (15# in last month). Negative for activity change, appetite change, chills, diaphoresis and fever.   HENT:  Negative for congestion, sinus pressure and sore throat.    Respiratory:  Positive for cough. Negative for chest tightness, shortness of breath and wheezing.    Cardiovascular:  Negative for palpitations and leg swelling.        Chest tightness   Gastrointestinal:  Negative for abdominal pain, diarrhea, nausea and vomiting.   Genitourinary:  Negative for dysuria, frequency and urgency.   Musculoskeletal:  Positive for arthralgias and myalgias.        Legs feel numb and tingling    Skin:  Negative for rash and wound.   Neurological:  Positive for  "headaches (better with suppleemntal O2). Negative for dizziness, syncope and light-headedness.   Psychiatric/Behavioral:  Negative for dysphoric mood and sleep disturbance. The patient is not nervous/anxious.         Feels "Grouchy"courtney easily during exam   Objective:     Vital Signs (Most Recent):  Temp: 98.1 °F (36.7 °C) (12/31/22 0717)  Pulse: 64 (12/31/22 1000)  Resp: 18 (12/31/22 0722)  BP: (!) 163/89 (12/31/22 0810)  SpO2: (!) 94 % (12/31/22 0722)   Vital Signs (24h Range):  Temp:  [96.3 °F (35.7 °C)-99 °F (37.2 °C)] 98.1 °F (36.7 °C)  Pulse:  [62-80] 64  Resp:  [16-20] 18  SpO2:  [90 %-96 %] 94 %  BP: (114-188)/(64-93) 163/89     Weight: 67.3 kg (148 lb 5.9 oz)  Body mass index is 21.29 kg/m².    Physical Exam  Constitutional:       Appearance: Normal appearance. He is well-developed. He is not ill-appearing.      Comments: thin   HENT:      Head: Normocephalic and atraumatic.      Right Ear: External ear normal.      Left Ear: External ear normal.      Nose: Nose normal.      Mouth/Throat:      Mouth: Mucous membranes are moist.      Pharynx: No oropharyngeal exudate or posterior oropharyngeal erythema.   Eyes:      General: No scleral icterus.        Right eye: No discharge.         Left eye: No discharge.      Conjunctiva/sclera: Conjunctivae normal.      Pupils: Pupils are equal, round, and reactive to light.   Neck:      Thyroid: No thyromegaly.      Vascular: No JVD.      Trachea: No tracheal deviation.   Cardiovascular:      Rate and Rhythm: Normal rate and regular rhythm.      Heart sounds: Normal heart sounds. No murmur heard.  Pulmonary:      Effort: Pulmonary effort is normal. No respiratory distress.      Breath sounds: No wheezing or rales.      Comments: Tight, but NO wheezing this am.  Slight supraclavicular retractions but overall MUCH less dyspneic  Chest:      Chest wall: No tenderness.   Abdominal:      General: Bowel sounds are normal. There is no distension.      Palpations: Abdomen is " soft. There is no mass.      Tenderness: There is no abdominal tenderness. There is no guarding or rebound.   Musculoskeletal:         General: Normal range of motion.      Cervical back: Normal range of motion and neck supple.      Right lower leg: No edema.      Left lower leg: No edema.   Lymphadenopathy:      Cervical: No cervical adenopathy.   Skin:     General: Skin is warm and dry.   Neurological:      Mental Status: He is alert and oriented to person, place, and time.      Cranial Nerves: No cranial nerve deficit.      Motor: No abnormal muscle tone.      Coordination: Coordination normal.      Deep Tendon Reflexes: Reflexes are normal and symmetric. Reflexes normal.   Psychiatric:         Behavior: Behavior normal.         Thought Content: Thought content normal.         Judgment: Judgment normal.      Comments: Improved disposition         CRANIAL NERVES     CN III, IV, VI   Pupils are equal, round, and reactive to light.     Significant Labs: All pertinent labs within the past 24 hours have been reviewed.  Lab Results   Component Value Date    WBC 13.07 (H) 12/27/2022    HGB 15.6 12/27/2022    HCT 44.7 12/27/2022    MCV 96 12/27/2022     12/27/2022       BMP  Lab Results   Component Value Date     12/27/2022    K 4.2 12/27/2022     12/27/2022    CO2 25 12/27/2022    BUN 11 12/27/2022    CREATININE 0.7 12/27/2022    CALCIUM 9.8 12/27/2022    ANIONGAP 11 12/27/2022    EGFRNORACEVR >60 12/27/2022     Lab Results   Component Value Date    ALT 26 12/27/2022    AST 23 12/27/2022    ALKPHOS 79 12/27/2022    BILITOT 0.5 12/27/2022   BNP  Recent Labs   Lab 12/27/22  1837   BNP 27           Recent Labs   Lab 12/27/22  1836   CPK 68   TROPONINI 0.008  0.008       Covid negative   Flu negative     Urinalysis  Recent Labs   Lab 12/28/22  1515   COLORU Yellow   SPECGRAV 1.015   PHUR 7.0   PROTEINUA Negative   NITRITE Negative   LEUKOCYTESUR Negative   UROBILINOGEN Negative          9/8/22  quantiferon GOLD negative     Significant Imaging:     CXR No acute radiographic abnormality    EKG Normal sinus rhythm  Biatrial enlargement  Incomplete right bundle branch block  Abnormal ECG  When compared with ECG of 22-DEC-2022 10:52,  Criteria for Septal infarct are no longer Present  Nonspecific T wave abnormality no longer evident in Anterior leads      Assessment/Plan:      * COPD exacerbation  duonebs every 4hr  Medrol 125mg IV every 6hr> weaned last night to 80mg IV every 8hr  RA sats %> but was placed on 1L NC for comfort which he says helps him feel not as SOB    Still with trouble speaking in sentences. He does report shorter recovery time with BANGURA, still feels tight  Will try bipap today if he can tolerate it  mucormyst x 1.     12/31  Much improved resp exam. Still dyspneic with exertion.  No o2 requirement. Getting a lot of steroid at this point, so will wean and anticipate possible d/c home tomorrow.    Anxiety and depression  start low dose cymbalta for mood as well as chronic neuropathy pain/numbness in legs   add ativan very low dose for sleep as needed (he is very anxious as well)  Tolerating cymbalta well and ativan is helping him rest well     Bilateral low back pain with sciatica  Cont home gabapentin       Stage 4 very severe COPD by GOLD classification  Steroids and nebs  F/u pulm outpt as scheduled for bronch 1/9/2023 12/31  Tolerated mucormyst very well. Repeat dose today.  Can taper steroid.    Essential hypertension  Cont home meds lisinopril, coreg and amlodipine   /79    136//81  Likely being pushed up by his steroids.    Will not adjust meds.    Bp remains slightly elevated, again likely steroid driven. However, will give a small dose of hctz this am.  Denies any issues or pain.      VTE Risk Mitigation (From admission, onward)         Ordered     IP VTE HIGH RISK PATIENT  Once         12/27/22 2211     Place sequential compression device  Until discontinued          12/27/22 2211                Discharge Planning   SELVIN:      Code Status: Full Code   Is the patient medically ready for discharge?:     Reason for patient still in hospital (select all that apply): Patient trending condition, Treatment and Pending disposition  Discharge Plan A: Home with family                  Rocio Rider MD  Department of Hospital Medicine   [facility] - [facility] ([facility])

## 2023-01-01 ENCOUNTER — HOSPITAL ENCOUNTER (EMERGENCY)
Facility: HOSPITAL | Age: 63
Discharge: HOME | End: 2023-09-05
Attending: STUDENT IN AN ORGANIZED HEALTH CARE EDUCATION/TRAINING PROGRAM
Payer: MEDICARE

## 2023-01-01 ENCOUNTER — EXTERNAL HOME HEALTH (OUTPATIENT)
Dept: HOME HEALTH SERVICES | Facility: HOSPITAL | Age: 63
End: 2023-01-01
Payer: MEDICARE

## 2023-01-01 ENCOUNTER — PATIENT OUTREACH (OUTPATIENT)
Dept: ADMINISTRATIVE | Facility: CLINIC | Age: 63
End: 2023-01-01
Payer: MEDICARE

## 2023-01-01 ENCOUNTER — DOCUMENT SCAN (OUTPATIENT)
Dept: HOME HEALTH SERVICES | Facility: HOSPITAL | Age: 63
End: 2023-01-01
Payer: MEDICARE

## 2023-01-01 ENCOUNTER — HOSPITAL ENCOUNTER (EMERGENCY)
Facility: HOSPITAL | Age: 63
Discharge: HOME | End: 2023-04-14
Attending: EMERGENCY MEDICINE
Payer: MEDICARE

## 2023-01-01 ENCOUNTER — NURSE TRIAGE (OUTPATIENT)
Dept: ADMINISTRATIVE | Facility: CLINIC | Age: 63
End: 2023-01-01
Payer: MEDICARE

## 2023-01-01 ENCOUNTER — HOSPITAL ENCOUNTER (INPATIENT)
Facility: HOSPITAL | Age: 63
LOS: 6 days | Discharge: HOME | DRG: 190 | End: 2023-03-18
Attending: EMERGENCY MEDICINE | Admitting: STUDENT IN AN ORGANIZED HEALTH CARE EDUCATION/TRAINING PROGRAM
Payer: MEDICARE

## 2023-01-01 ENCOUNTER — PATIENT OUTREACH (OUTPATIENT)
Dept: ADMINISTRATIVE | Facility: OTHER | Age: 63
End: 2023-01-01
Payer: MEDICARE

## 2023-01-01 ENCOUNTER — HOSPITAL ENCOUNTER (INPATIENT)
Facility: HOSPITAL | Age: 63
LOS: 7 days | Discharge: HOME HEALTH | DRG: 150 | End: 2023-08-02
Attending: INTERNAL MEDICINE | Admitting: INTERNAL MEDICINE
Payer: MEDICARE

## 2023-01-01 ENCOUNTER — PATIENT OUTREACH (OUTPATIENT)
Dept: ADMINISTRATIVE | Facility: HOSPITAL | Age: 63
End: 2023-01-01
Payer: MEDICARE

## 2023-01-01 VITALS
WEIGHT: 145.75 LBS | RESPIRATION RATE: 18 BRPM | HEIGHT: 70 IN | OXYGEN SATURATION: 96 % | BODY MASS INDEX: 20.87 KG/M2 | HEART RATE: 81 BPM | TEMPERATURE: 99 F | SYSTOLIC BLOOD PRESSURE: 113 MMHG | DIASTOLIC BLOOD PRESSURE: 63 MMHG

## 2023-01-01 VITALS
BODY MASS INDEX: 18.5 KG/M2 | OXYGEN SATURATION: 95 % | TEMPERATURE: 97 F | HEIGHT: 70 IN | HEART RATE: 94 BPM | DIASTOLIC BLOOD PRESSURE: 77 MMHG | SYSTOLIC BLOOD PRESSURE: 145 MMHG | RESPIRATION RATE: 18 BRPM

## 2023-01-01 VITALS
HEIGHT: 70 IN | OXYGEN SATURATION: 98 % | DIASTOLIC BLOOD PRESSURE: 85 MMHG | BODY MASS INDEX: 19.33 KG/M2 | TEMPERATURE: 98 F | RESPIRATION RATE: 22 BRPM | SYSTOLIC BLOOD PRESSURE: 158 MMHG | WEIGHT: 135 LBS | HEART RATE: 74 BPM

## 2023-01-01 VITALS
OXYGEN SATURATION: 92 % | RESPIRATION RATE: 18 BRPM | WEIGHT: 149.94 LBS | HEIGHT: 70 IN | BODY MASS INDEX: 21.47 KG/M2 | SYSTOLIC BLOOD PRESSURE: 119 MMHG | DIASTOLIC BLOOD PRESSURE: 73 MMHG | TEMPERATURE: 98 F | HEART RATE: 71 BPM

## 2023-01-01 VITALS
OXYGEN SATURATION: 99 % | DIASTOLIC BLOOD PRESSURE: 78 MMHG | BODY MASS INDEX: 19 KG/M2 | RESPIRATION RATE: 16 BRPM | WEIGHT: 132.69 LBS | SYSTOLIC BLOOD PRESSURE: 162 MMHG | TEMPERATURE: 98 F | HEART RATE: 93 BPM | HEIGHT: 70 IN

## 2023-01-01 DIAGNOSIS — F41.9 ANXIETY AND DEPRESSION: ICD-10-CM

## 2023-01-01 DIAGNOSIS — Z99.81 ON HOME OXYGEN THERAPY: ICD-10-CM
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
## 2023-01-01 DIAGNOSIS — R07.9 CHEST PAIN: ICD-10-CM

## 2023-01-01 DIAGNOSIS — J44.1 COPD EXACERBATION: Primary | ICD-10-CM

## 2023-01-01 DIAGNOSIS — R06.00 DYSPNEA: ICD-10-CM

## 2023-01-01 DIAGNOSIS — J44.9 CHRONIC OBSTRUCTIVE PULMONARY DISEASE, UNSPECIFIED COPD TYPE: ICD-10-CM

## 2023-01-01 DIAGNOSIS — I95.1 ORTHOSTATIC HYPOTENSION: ICD-10-CM

## 2023-01-01 DIAGNOSIS — R53.1 GENERALIZED WEAKNESS: Primary | ICD-10-CM

## 2023-01-01 DIAGNOSIS — R04.0 EPISTAXIS: Primary | ICD-10-CM

## 2023-01-01 DIAGNOSIS — R06.00 DYSPNEA, UNSPECIFIED TYPE: ICD-10-CM

## 2023-01-01 DIAGNOSIS — M25.532 WRIST PAIN, ACUTE, LEFT: ICD-10-CM

## 2023-01-01 DIAGNOSIS — F32.A ANXIETY AND DEPRESSION: ICD-10-CM

## 2023-01-01 DIAGNOSIS — J96.00 ACUTE RESPIRATORY FAILURE: ICD-10-CM

## 2023-01-01 LAB
ABO + RH BLD: NORMAL
ALBUMIN SERPL BCP-MCNC: 2.8 G/DL (ref 3.5–5.2)
ALBUMIN SERPL BCP-MCNC: 3 G/DL (ref 3.5–5.2)
ALBUMIN SERPL BCP-MCNC: 3.1 G/DL (ref 3.5–5.2)
ALBUMIN SERPL BCP-MCNC: 3.3 G/DL (ref 3.5–5.2)
ALBUMIN SERPL BCP-MCNC: 3.7 G/DL (ref 3.5–5.2)
ALP SERPL-CCNC: 107 U/L (ref 55–135)
ALP SERPL-CCNC: 39 U/L (ref 55–135)
ALP SERPL-CCNC: 39 U/L (ref 55–135)
ALP SERPL-CCNC: 40 U/L (ref 55–135)
ALP SERPL-CCNC: 41 U/L (ref 55–135)
ALP SERPL-CCNC: 42 U/L (ref 55–135)
ALP SERPL-CCNC: 42 U/L (ref 55–135)
ALP SERPL-CCNC: 48 U/L (ref 55–135)
ALP SERPL-CCNC: 93 U/L (ref 55–135)
ALT SERPL W/O P-5'-P-CCNC: 12 U/L (ref 10–44)
ALT SERPL W/O P-5'-P-CCNC: 17 U/L (ref 10–44)
ALT SERPL W/O P-5'-P-CCNC: 20 U/L (ref 10–44)
ALT SERPL W/O P-5'-P-CCNC: 24 U/L (ref 10–44)
ALT SERPL W/O P-5'-P-CCNC: 25 U/L (ref 10–44)
ALT SERPL W/O P-5'-P-CCNC: 28 U/L (ref 10–44)
ALT SERPL W/O P-5'-P-CCNC: 9 U/L (ref 10–44)
AMORPH CRY URNS QL MICRO: ABNORMAL
ANION GAP SERPL CALC-SCNC: 10 MMOL/L (ref 8–16)
ANION GAP SERPL CALC-SCNC: 10 MMOL/L (ref 8–16)
ANION GAP SERPL CALC-SCNC: 11 MMOL/L (ref 8–16)
ANION GAP SERPL CALC-SCNC: 13 MMOL/L (ref 8–16)
ANION GAP SERPL CALC-SCNC: 5 MMOL/L (ref 8–16)
ANION GAP SERPL CALC-SCNC: 6 MMOL/L (ref 8–16)
ANION GAP SERPL CALC-SCNC: 7 MMOL/L (ref 8–16)
ANION GAP SERPL CALC-SCNC: 8 MMOL/L (ref 8–16)
ANISOCYTOSIS BLD QL SMEAR: SLIGHT
ASCENDING AORTA: 2.82 CM
AST SERPL-CCNC: 12 U/L (ref 10–40)
AST SERPL-CCNC: 14 U/L (ref 10–40)
AST SERPL-CCNC: 16 U/L (ref 10–40)
AST SERPL-CCNC: 17 U/L (ref 10–40)
AST SERPL-CCNC: 18 U/L (ref 10–40)
AST SERPL-CCNC: 19 U/L (ref 10–40)
AST SERPL-CCNC: 19 U/L (ref 10–40)
AV INDEX (PROSTH): 0.64
AV MEAN GRADIENT: 5 MMHG
AV PEAK GRADIENT: 8 MMHG
AV VALVE AREA: 2.51 CM2
AV VELOCITY RATIO: 0.62
BACTERIA #/AREA URNS HPF: ABNORMAL /HPF
BACTERIA BLD CULT: NORMAL
BACTERIA BLD CULT: NORMAL
BASOPHILS # BLD AUTO: 0.02 K/UL (ref 0–0.2)
BASOPHILS # BLD AUTO: 0.02 K/UL (ref 0–0.2)
BASOPHILS # BLD AUTO: 0.03 K/UL (ref 0–0.2)
BASOPHILS # BLD AUTO: 0.05 K/UL (ref 0–0.2)
BASOPHILS # BLD AUTO: 0.06 K/UL (ref 0–0.2)
BASOPHILS # BLD AUTO: 0.06 K/UL (ref 0–0.2)
BASOPHILS # BLD AUTO: 0.07 K/UL (ref 0–0.2)
BASOPHILS # BLD AUTO: 0.07 K/UL (ref 0–0.2)
BASOPHILS # BLD AUTO: 0.08 K/UL (ref 0–0.2)
BASOPHILS # BLD AUTO: 0.09 K/UL (ref 0–0.2)
BASOPHILS # BLD AUTO: 0.09 K/UL (ref 0–0.2)
BASOPHILS NFR BLD: 0.1 % (ref 0–1.9)
BASOPHILS NFR BLD: 0.2 % (ref 0–1.9)
BASOPHILS NFR BLD: 0.3 % (ref 0–1.9)
BASOPHILS NFR BLD: 0.3 % (ref 0–1.9)
BASOPHILS NFR BLD: 0.4 % (ref 0–1.9)
BASOPHILS NFR BLD: 0.4 % (ref 0–1.9)
BASOPHILS NFR BLD: 0.5 % (ref 0–1.9)
BASOPHILS NFR BLD: 0.6 % (ref 0–1.9)
BASOPHILS NFR BLD: 0.6 % (ref 0–1.9)
BILIRUB SERPL-MCNC: 0.2 MG/DL (ref 0.1–1)
BILIRUB SERPL-MCNC: 0.3 MG/DL (ref 0.1–1)
BILIRUB SERPL-MCNC: 0.3 MG/DL (ref 0.1–1)
BILIRUB SERPL-MCNC: 0.6 MG/DL (ref 0.1–1)
BILIRUB UR QL STRIP: NEGATIVE
BILIRUB UR QL STRIP: NEGATIVE
BLD GP AB SCN CELLS X3 SERPL QL: NORMAL
BLD PROD TYP BPU: NORMAL
BLOOD UNIT EXPIRATION DATE: NORMAL
BLOOD UNIT TYPE CODE: 5100
BLOOD UNIT TYPE: NORMAL
BNP SERPL-MCNC: 24 PG/ML (ref 0–99)
BNP SERPL-MCNC: 70 PG/ML (ref 0–99)
BSA FOR ECHO PROCEDURE: 1.78 M2
BUN SERPL-MCNC: 14 MG/DL (ref 8–23)
BUN SERPL-MCNC: 16 MG/DL (ref 8–23)
BUN SERPL-MCNC: 25 MG/DL (ref 8–23)
BUN SERPL-MCNC: 4 MG/DL (ref 8–23)
BUN SERPL-MCNC: 4 MG/DL (ref 8–23)
BUN SERPL-MCNC: 5 MG/DL (ref 8–23)
BUN SERPL-MCNC: 7 MG/DL (ref 8–23)
BUN SERPL-MCNC: 9 MG/DL (ref 8–23)
BUN SERPL-MCNC: 9 MG/DL (ref 8–23)
CALCIUM SERPL-MCNC: 10.3 MG/DL (ref 8.7–10.5)
CALCIUM SERPL-MCNC: 8.3 MG/DL (ref 8.7–10.5)
CALCIUM SERPL-MCNC: 8.4 MG/DL (ref 8.7–10.5)
CALCIUM SERPL-MCNC: 8.5 MG/DL (ref 8.7–10.5)
CALCIUM SERPL-MCNC: 8.5 MG/DL (ref 8.7–10.5)
CALCIUM SERPL-MCNC: 8.6 MG/DL (ref 8.7–10.5)
CALCIUM SERPL-MCNC: 9 MG/DL (ref 8.7–10.5)
CALCIUM SERPL-MCNC: 9.1 MG/DL (ref 8.7–10.5)
CALCIUM SERPL-MCNC: 9.4 MG/DL (ref 8.7–10.5)
CALCIUM SERPL-MCNC: 9.6 MG/DL (ref 8.7–10.5)
CALCIUM SERPL-MCNC: 9.9 MG/DL (ref 8.7–10.5)
CHLORIDE SERPL-SCNC: 92 MMOL/L (ref 95–110)
CHLORIDE SERPL-SCNC: 93 MMOL/L (ref 95–110)
CHLORIDE SERPL-SCNC: 93 MMOL/L (ref 95–110)
CHLORIDE SERPL-SCNC: 94 MMOL/L (ref 95–110)
CHLORIDE SERPL-SCNC: 95 MMOL/L (ref 95–110)
CHLORIDE SERPL-SCNC: 96 MMOL/L (ref 95–110)
CHLORIDE SERPL-SCNC: 97 MMOL/L (ref 95–110)
CHLORIDE SERPL-SCNC: 98 MMOL/L (ref 95–110)
CHLORIDE SERPL-SCNC: 99 MMOL/L (ref 95–110)
CLARITY UR REFRACT.AUTO: CLEAR
CLARITY UR: ABNORMAL
CO2 SERPL-SCNC: 27 MMOL/L (ref 23–29)
CO2 SERPL-SCNC: 27 MMOL/L (ref 23–29)
CO2 SERPL-SCNC: 29 MMOL/L (ref 23–29)
CO2 SERPL-SCNC: 30 MMOL/L (ref 23–29)
CO2 SERPL-SCNC: 31 MMOL/L (ref 23–29)
CO2 SERPL-SCNC: 31 MMOL/L (ref 23–29)
CO2 SERPL-SCNC: 32 MMOL/L (ref 23–29)
CO2 SERPL-SCNC: 35 MMOL/L (ref 23–29)
CO2 SERPL-SCNC: 36 MMOL/L (ref 23–29)
CO2 SERPL-SCNC: 36 MMOL/L (ref 23–29)
CO2 SERPL-SCNC: 37 MMOL/L (ref 23–29)
CODING SYSTEM: NORMAL
COLOR UR AUTO: COLORLESS
COLOR UR: YELLOW
CREAT SERPL-MCNC: 0.5 MG/DL (ref 0.5–1.4)
CREAT SERPL-MCNC: 0.6 MG/DL (ref 0.5–1.4)
CREAT SERPL-MCNC: 0.7 MG/DL (ref 0.5–1.4)
CROSSMATCH INTERPRETATION: NORMAL
CV ECHO LV RWT: 0.33 CM
D DIMER PPP IA.FEU-MCNC: 0.34 MG/L FEU
DACRYOCYTES BLD QL SMEAR: ABNORMAL
DIFFERENTIAL METHOD: ABNORMAL
DISPENSE STATUS: NORMAL
DOP CALC AO PEAK VEL: 1.44 M/S
DOP CALC AO VTI: 29.2 CM
DOP CALC LVOT AREA: 3.9 CM2
DOP CALC LVOT DIAMETER: 2.24 CM
DOP CALC LVOT PEAK VEL: 0.89 M/S
DOP CALC LVOT STROKE VOLUME: 73.26 CM3
DOP CALC RVOT PEAK VEL: 0.61 M/S
DOP CALC RVOT VTI: 15.9 CM
DOP CALCLVOT PEAK VEL VTI: 18.6 CM
E WAVE DECELERATION TIME: 242.31 MSEC
E/A RATIO: 0.9
E/E' RATIO: 8 M/S
ECHO LV POSTERIOR WALL: 0.84 CM (ref 0.6–1.1)
EJECTION FRACTION: 60 %
EOSINOPHIL # BLD AUTO: 0 K/UL (ref 0–0.5)
EOSINOPHIL # BLD AUTO: 0.1 K/UL (ref 0–0.5)
EOSINOPHIL # BLD AUTO: 0.2 K/UL (ref 0–0.5)
EOSINOPHIL # BLD AUTO: 0.2 K/UL (ref 0–0.5)
EOSINOPHIL # BLD AUTO: 0.3 K/UL (ref 0–0.5)
EOSINOPHIL # BLD AUTO: 0.4 K/UL (ref 0–0.5)
EOSINOPHIL NFR BLD: 0 % (ref 0–8)
EOSINOPHIL NFR BLD: 0.1 % (ref 0–8)
EOSINOPHIL NFR BLD: 0.3 % (ref 0–8)
EOSINOPHIL NFR BLD: 1.3 % (ref 0–8)
EOSINOPHIL NFR BLD: 1.5 % (ref 0–8)
EOSINOPHIL NFR BLD: 1.9 % (ref 0–8)
EOSINOPHIL NFR BLD: 2 % (ref 0–8)
EOSINOPHIL NFR BLD: 2.2 % (ref 0–8)
EOSINOPHIL NFR BLD: 2.4 % (ref 0–8)
ERYTHROCYTE [DISTWIDTH] IN BLOOD BY AUTOMATED COUNT: 13.1 % (ref 11.5–14.5)
ERYTHROCYTE [DISTWIDTH] IN BLOOD BY AUTOMATED COUNT: 13.2 % (ref 11.5–14.5)
ERYTHROCYTE [DISTWIDTH] IN BLOOD BY AUTOMATED COUNT: 13.3 % (ref 11.5–14.5)
ERYTHROCYTE [DISTWIDTH] IN BLOOD BY AUTOMATED COUNT: 13.4 % (ref 11.5–14.5)
ERYTHROCYTE [DISTWIDTH] IN BLOOD BY AUTOMATED COUNT: 13.4 % (ref 11.5–14.5)
ERYTHROCYTE [DISTWIDTH] IN BLOOD BY AUTOMATED COUNT: 13.5 % (ref 11.5–14.5)
ERYTHROCYTE [DISTWIDTH] IN BLOOD BY AUTOMATED COUNT: 13.6 % (ref 11.5–14.5)
ERYTHROCYTE [DISTWIDTH] IN BLOOD BY AUTOMATED COUNT: 13.9 % (ref 11.5–14.5)
ERYTHROCYTE [DISTWIDTH] IN BLOOD BY AUTOMATED COUNT: 14.4 % (ref 11.5–14.5)
ERYTHROCYTE [DISTWIDTH] IN BLOOD BY AUTOMATED COUNT: 16.4 % (ref 11.5–14.5)
EST. GFR  (NO RACE VARIABLE): >60 ML/MIN/1.73 M^2
FRACTIONAL SHORTENING: 41 % (ref 28–44)
GLUCOSE SERPL-MCNC: 102 MG/DL (ref 70–110)
GLUCOSE SERPL-MCNC: 105 MG/DL (ref 70–110)
GLUCOSE SERPL-MCNC: 108 MG/DL (ref 70–110)
GLUCOSE SERPL-MCNC: 113 MG/DL (ref 70–110)
GLUCOSE SERPL-MCNC: 126 MG/DL (ref 70–110)
GLUCOSE SERPL-MCNC: 131 MG/DL (ref 70–110)
GLUCOSE SERPL-MCNC: 141 MG/DL (ref 70–110)
GLUCOSE SERPL-MCNC: 62 MG/DL (ref 70–110)
GLUCOSE SERPL-MCNC: 82 MG/DL (ref 70–110)
GLUCOSE SERPL-MCNC: 84 MG/DL (ref 70–110)
GLUCOSE SERPL-MCNC: 84 MG/DL (ref 70–110)
GLUCOSE SERPL-MCNC: 85 MG/DL (ref 70–110)
GLUCOSE SERPL-MCNC: 91 MG/DL (ref 70–110)
GLUCOSE UR QL STRIP: NEGATIVE
GLUCOSE UR QL STRIP: NEGATIVE
HCT VFR BLD AUTO: 22.4 % (ref 40–54)
HCT VFR BLD AUTO: 22.6 % (ref 40–54)
HCT VFR BLD AUTO: 22.8 % (ref 40–54)
HCT VFR BLD AUTO: 23.1 % (ref 40–54)
HCT VFR BLD AUTO: 24.1 % (ref 40–54)
HCT VFR BLD AUTO: 24.3 % (ref 40–54)
HCT VFR BLD AUTO: 25.1 % (ref 40–54)
HCT VFR BLD AUTO: 25.8 % (ref 40–54)
HCT VFR BLD AUTO: 28 % (ref 40–54)
HCT VFR BLD AUTO: 29.7 % (ref 40–54)
HCT VFR BLD AUTO: 40.1 % (ref 40–54)
HCT VFR BLD AUTO: 40.2 % (ref 40–54)
HCT VFR BLD AUTO: 42 % (ref 40–54)
HCT VFR BLD AUTO: 42.7 % (ref 40–54)
HCT VFR BLD AUTO: 43.7 % (ref 40–54)
HCT VFR BLD AUTO: 44.6 % (ref 40–54)
HGB BLD-MCNC: 13.2 G/DL (ref 14–18)
HGB BLD-MCNC: 13.3 G/DL (ref 14–18)
HGB BLD-MCNC: 13.6 G/DL (ref 14–18)
HGB BLD-MCNC: 14.4 G/DL (ref 14–18)
HGB BLD-MCNC: 7.4 G/DL (ref 14–18)
HGB BLD-MCNC: 7.6 G/DL (ref 14–18)
HGB BLD-MCNC: 7.7 G/DL (ref 14–18)
HGB BLD-MCNC: 7.7 G/DL (ref 14–18)
HGB BLD-MCNC: 8 G/DL (ref 14–18)
HGB BLD-MCNC: 8.2 G/DL (ref 14–18)
HGB BLD-MCNC: 8.3 G/DL (ref 14–18)
HGB BLD-MCNC: 8.5 G/DL (ref 14–18)
HGB BLD-MCNC: 9.1 G/DL (ref 14–18)
HGB BLD-MCNC: 9.6 G/DL (ref 14–18)
HGB UR QL STRIP: ABNORMAL
HGB UR QL STRIP: NEGATIVE
IMM GRANULOCYTES # BLD AUTO: 0.13 K/UL (ref 0–0.04)
IMM GRANULOCYTES # BLD AUTO: 0.16 K/UL (ref 0–0.04)
IMM GRANULOCYTES # BLD AUTO: 0.17 K/UL (ref 0–0.04)
IMM GRANULOCYTES # BLD AUTO: 0.2 K/UL (ref 0–0.04)
IMM GRANULOCYTES # BLD AUTO: 0.21 K/UL (ref 0–0.04)
IMM GRANULOCYTES # BLD AUTO: 0.22 K/UL (ref 0–0.04)
IMM GRANULOCYTES # BLD AUTO: 0.26 K/UL (ref 0–0.04)
IMM GRANULOCYTES # BLD AUTO: 0.28 K/UL (ref 0–0.04)
IMM GRANULOCYTES # BLD AUTO: 0.29 K/UL (ref 0–0.04)
IMM GRANULOCYTES # BLD AUTO: 0.34 K/UL (ref 0–0.04)
IMM GRANULOCYTES # BLD AUTO: 0.38 K/UL (ref 0–0.04)
IMM GRANULOCYTES # BLD AUTO: 0.46 K/UL (ref 0–0.04)
IMM GRANULOCYTES NFR BLD AUTO: 0.8 % (ref 0–0.5)
IMM GRANULOCYTES NFR BLD AUTO: 0.9 % (ref 0–0.5)
IMM GRANULOCYTES NFR BLD AUTO: 0.9 % (ref 0–0.5)
IMM GRANULOCYTES NFR BLD AUTO: 1 % (ref 0–0.5)
IMM GRANULOCYTES NFR BLD AUTO: 1 % (ref 0–0.5)
IMM GRANULOCYTES NFR BLD AUTO: 1.1 % (ref 0–0.5)
IMM GRANULOCYTES NFR BLD AUTO: 1.2 % (ref 0–0.5)
IMM GRANULOCYTES NFR BLD AUTO: 1.2 % (ref 0–0.5)
IMM GRANULOCYTES NFR BLD AUTO: 1.3 % (ref 0–0.5)
IMM GRANULOCYTES NFR BLD AUTO: 1.6 % (ref 0–0.5)
IMM GRANULOCYTES NFR BLD AUTO: 1.7 % (ref 0–0.5)
IMM GRANULOCYTES NFR BLD AUTO: 1.8 % (ref 0–0.5)
IMM GRANULOCYTES NFR BLD AUTO: 2 % (ref 0–0.5)
IMM GRANULOCYTES NFR BLD AUTO: 2.2 % (ref 0–0.5)
IMM GRANULOCYTES NFR BLD AUTO: 2.4 % (ref 0–0.5)
INFLUENZA A, MOLECULAR: NEGATIVE
INFLUENZA A, MOLECULAR: NEGATIVE
INFLUENZA B, MOLECULAR: NEGATIVE
INFLUENZA B, MOLECULAR: NEGATIVE
INTERVENTRICULAR SEPTUM: 0.78 CM (ref 0.6–1.1)
IVC DIAMETER: 18 CM
IVRT: 110.37 MSEC
KETONES UR QL STRIP: NEGATIVE
KETONES UR QL STRIP: NEGATIVE
LA MAJOR: 3.41 CM
LA MINOR: 4.09 CM
LA WIDTH: 2.8 CM
LACTATE SERPL-SCNC: 1 MMOL/L (ref 0.5–2.2)
LEFT ATRIUM SIZE: 3.11 CM
LEFT ATRIUM VOLUME INDEX MOD: 14.4 ML/M2
LEFT ATRIUM VOLUME INDEX: 15.2 ML/M2
LEFT ATRIUM VOLUME MOD: 26.07 CM3
LEFT ATRIUM VOLUME: 27.53 CM3
LEFT INTERNAL DIMENSION IN SYSTOLE: 3.02 CM (ref 2.1–4)
LEFT VENTRICLE DIASTOLIC VOLUME INDEX: 69.26 ML/M2
LEFT VENTRICLE DIASTOLIC VOLUME: 125.36 ML
LEFT VENTRICLE MASS INDEX: 80 G/M2
LEFT VENTRICLE SYSTOLIC VOLUME INDEX: 19.6 ML/M2
LEFT VENTRICLE SYSTOLIC VOLUME: 35.47 ML
LEFT VENTRICULAR INTERNAL DIMENSION IN DIASTOLE: 5.13 CM (ref 3.5–6)
LEFT VENTRICULAR MASS: 144.15 G
LEUKOCYTE ESTERASE UR QL STRIP: ABNORMAL
LEUKOCYTE ESTERASE UR QL STRIP: ABNORMAL
LV LATERAL E/E' RATIO: 8.4 M/S
LV SEPTAL E/E' RATIO: 7.64 M/S
LVOT MG: 1.81 MMHG
LVOT MV: 0.64 CM/S
LYMPHOCYTES # BLD AUTO: 0.8 K/UL (ref 1–4.8)
LYMPHOCYTES # BLD AUTO: 1.2 K/UL (ref 1–4.8)
LYMPHOCYTES # BLD AUTO: 1.4 K/UL (ref 1–4.8)
LYMPHOCYTES # BLD AUTO: 1.6 K/UL (ref 1–4.8)
LYMPHOCYTES # BLD AUTO: 1.6 K/UL (ref 1–4.8)
LYMPHOCYTES # BLD AUTO: 1.7 K/UL (ref 1–4.8)
LYMPHOCYTES # BLD AUTO: 1.9 K/UL (ref 1–4.8)
LYMPHOCYTES # BLD AUTO: 2.3 K/UL (ref 1–4.8)
LYMPHOCYTES # BLD AUTO: 3.1 K/UL (ref 1–4.8)
LYMPHOCYTES # BLD AUTO: 3.1 K/UL (ref 1–4.8)
LYMPHOCYTES # BLD AUTO: 3.2 K/UL (ref 1–4.8)
LYMPHOCYTES # BLD AUTO: 3.2 K/UL (ref 1–4.8)
LYMPHOCYTES # BLD AUTO: 3.7 K/UL (ref 1–4.8)
LYMPHOCYTES # BLD AUTO: 3.7 K/UL (ref 1–4.8)
LYMPHOCYTES # BLD AUTO: 3.8 K/UL (ref 1–4.8)
LYMPHOCYTES NFR BLD: 10 % (ref 18–48)
LYMPHOCYTES NFR BLD: 12.4 % (ref 18–48)
LYMPHOCYTES NFR BLD: 14.8 % (ref 18–48)
LYMPHOCYTES NFR BLD: 16.2 % (ref 18–48)
LYMPHOCYTES NFR BLD: 20.5 % (ref 18–48)
LYMPHOCYTES NFR BLD: 20.8 % (ref 18–48)
LYMPHOCYTES NFR BLD: 22.9 % (ref 18–48)
LYMPHOCYTES NFR BLD: 23.2 % (ref 18–48)
LYMPHOCYTES NFR BLD: 23.2 % (ref 18–48)
LYMPHOCYTES NFR BLD: 24.7 % (ref 18–48)
LYMPHOCYTES NFR BLD: 4.4 % (ref 18–48)
LYMPHOCYTES NFR BLD: 4.8 % (ref 18–48)
LYMPHOCYTES NFR BLD: 5.2 % (ref 18–48)
LYMPHOCYTES NFR BLD: 5.5 % (ref 18–48)
LYMPHOCYTES NFR BLD: 5.6 % (ref 18–48)
MAGNESIUM SERPL-MCNC: 1.7 MG/DL (ref 1.6–2.6)
MAGNESIUM SERPL-MCNC: 1.8 MG/DL (ref 1.6–2.6)
MAGNESIUM SERPL-MCNC: 1.8 MG/DL (ref 1.6–2.6)
MAGNESIUM SERPL-MCNC: 1.9 MG/DL (ref 1.6–2.6)
MCH RBC QN AUTO: 31.2 PG (ref 27–31)
MCH RBC QN AUTO: 31.5 PG (ref 27–31)
MCH RBC QN AUTO: 31.5 PG (ref 27–31)
MCH RBC QN AUTO: 31.9 PG (ref 27–31)
MCH RBC QN AUTO: 32 PG (ref 27–31)
MCH RBC QN AUTO: 32.2 PG (ref 27–31)
MCH RBC QN AUTO: 32.3 PG (ref 27–31)
MCH RBC QN AUTO: 32.5 PG (ref 27–31)
MCH RBC QN AUTO: 32.5 PG (ref 27–31)
MCH RBC QN AUTO: 32.7 PG (ref 27–31)
MCH RBC QN AUTO: 32.8 PG (ref 27–31)
MCH RBC QN AUTO: 32.9 PG (ref 27–31)
MCH RBC QN AUTO: 32.9 PG (ref 27–31)
MCH RBC QN AUTO: 33.2 PG (ref 27–31)
MCH RBC QN AUTO: 33.3 PG (ref 27–31)
MCHC RBC AUTO-ENTMCNC: 32.3 G/DL (ref 32–36)
MCHC RBC AUTO-ENTMCNC: 32.3 G/DL (ref 32–36)
MCHC RBC AUTO-ENTMCNC: 32.4 G/DL (ref 32–36)
MCHC RBC AUTO-ENTMCNC: 32.5 G/DL (ref 32–36)
MCHC RBC AUTO-ENTMCNC: 32.5 G/DL (ref 32–36)
MCHC RBC AUTO-ENTMCNC: 32.9 G/DL (ref 32–36)
MCHC RBC AUTO-ENTMCNC: 33 G/DL (ref 32–36)
MCHC RBC AUTO-ENTMCNC: 33.1 G/DL (ref 32–36)
MCHC RBC AUTO-ENTMCNC: 33.2 G/DL (ref 32–36)
MCHC RBC AUTO-ENTMCNC: 33.7 G/DL (ref 32–36)
MCHC RBC AUTO-ENTMCNC: 33.7 G/DL (ref 32–36)
MCHC RBC AUTO-ENTMCNC: 34.1 G/DL (ref 32–36)
MCHC RBC AUTO-ENTMCNC: 34.4 G/DL (ref 32–36)
MCV RBC AUTO: 100 FL (ref 82–98)
MCV RBC AUTO: 100 FL (ref 82–98)
MCV RBC AUTO: 101 FL (ref 82–98)
MCV RBC AUTO: 101 FL (ref 82–98)
MCV RBC AUTO: 95 FL (ref 82–98)
MCV RBC AUTO: 95 FL (ref 82–98)
MCV RBC AUTO: 96 FL (ref 82–98)
MCV RBC AUTO: 97 FL (ref 82–98)
MCV RBC AUTO: 98 FL (ref 82–98)
MCV RBC AUTO: 99 FL (ref 82–98)
MCV RBC AUTO: 99 FL (ref 82–98)
MICROSCOPIC COMMENT: ABNORMAL
MICROSCOPIC COMMENT: NORMAL
MONOCYTES # BLD AUTO: 0.1 K/UL (ref 0.3–1)
MONOCYTES # BLD AUTO: 0.3 K/UL (ref 0.3–1)
MONOCYTES # BLD AUTO: 0.5 K/UL (ref 0.3–1)
MONOCYTES # BLD AUTO: 1 K/UL (ref 0.3–1)
MONOCYTES # BLD AUTO: 1 K/UL (ref 0.3–1)
MONOCYTES # BLD AUTO: 1.2 K/UL (ref 0.3–1)
MONOCYTES # BLD AUTO: 1.2 K/UL (ref 0.3–1)
MONOCYTES # BLD AUTO: 1.3 K/UL (ref 0.3–1)
MONOCYTES # BLD AUTO: 1.3 K/UL (ref 0.3–1)
MONOCYTES # BLD AUTO: 1.4 K/UL (ref 0.3–1)
MONOCYTES # BLD AUTO: 1.4 K/UL (ref 0.3–1)
MONOCYTES # BLD AUTO: 1.5 K/UL (ref 0.3–1)
MONOCYTES # BLD AUTO: 1.6 K/UL (ref 0.3–1)
MONOCYTES # BLD AUTO: 1.8 K/UL (ref 0.3–1)
MONOCYTES # BLD AUTO: 2.6 K/UL (ref 0.3–1)
MONOCYTES NFR BLD: 0.4 % (ref 4–15)
MONOCYTES NFR BLD: 2.4 % (ref 4–15)
MONOCYTES NFR BLD: 3.6 % (ref 4–15)
MONOCYTES NFR BLD: 3.8 % (ref 4–15)
MONOCYTES NFR BLD: 4 % (ref 4–15)
MONOCYTES NFR BLD: 5.4 % (ref 4–15)
MONOCYTES NFR BLD: 6.5 % (ref 4–15)
MONOCYTES NFR BLD: 7.6 % (ref 4–15)
MONOCYTES NFR BLD: 8 % (ref 4–15)
MONOCYTES NFR BLD: 8.5 % (ref 4–15)
MONOCYTES NFR BLD: 8.9 % (ref 4–15)
MONOCYTES NFR BLD: 9 % (ref 4–15)
MONOCYTES NFR BLD: 9.1 % (ref 4–15)
MONOCYTES NFR BLD: 9.3 % (ref 4–15)
MONOCYTES NFR BLD: 9.4 % (ref 4–15)
MV PEAK A VEL: 0.93 M/S
MV PEAK E VEL: 0.84 M/S
MV STENOSIS PRESSURE HALF TIME: 70.27 MS
MV VALVE AREA P 1/2 METHOD: 3.13 CM2
NEUTROPHILS # BLD AUTO: 10 K/UL (ref 1.8–7.7)
NEUTROPHILS # BLD AUTO: 10.3 K/UL (ref 1.8–7.7)
NEUTROPHILS # BLD AUTO: 10.4 K/UL (ref 1.8–7.7)
NEUTROPHILS # BLD AUTO: 10.5 K/UL (ref 1.8–7.7)
NEUTROPHILS # BLD AUTO: 11.6 K/UL (ref 1.8–7.7)
NEUTROPHILS # BLD AUTO: 15.9 K/UL (ref 1.8–7.7)
NEUTROPHILS # BLD AUTO: 17.1 K/UL (ref 1.8–7.7)
NEUTROPHILS # BLD AUTO: 18.9 K/UL (ref 1.8–7.7)
NEUTROPHILS # BLD AUTO: 22.5 K/UL (ref 1.8–7.7)
NEUTROPHILS # BLD AUTO: 23.6 K/UL (ref 1.8–7.7)
NEUTROPHILS # BLD AUTO: 25.3 K/UL (ref 1.8–7.7)
NEUTROPHILS # BLD AUTO: 26.2 K/UL (ref 1.8–7.7)
NEUTROPHILS # BLD AUTO: 8.6 K/UL (ref 1.8–7.7)
NEUTROPHILS # BLD AUTO: 8.7 K/UL (ref 1.8–7.7)
NEUTROPHILS # BLD AUTO: 9.8 K/UL (ref 1.8–7.7)
NEUTROPHILS NFR BLD: 63 % (ref 38–73)
NEUTROPHILS NFR BLD: 63.4 % (ref 38–73)
NEUTROPHILS NFR BLD: 64.1 % (ref 38–73)
NEUTROPHILS NFR BLD: 64.1 % (ref 38–73)
NEUTROPHILS NFR BLD: 65.7 % (ref 38–73)
NEUTROPHILS NFR BLD: 67.2 % (ref 38–73)
NEUTROPHILS NFR BLD: 74.7 % (ref 38–73)
NEUTROPHILS NFR BLD: 79.7 % (ref 38–73)
NEUTROPHILS NFR BLD: 82.3 % (ref 38–73)
NEUTROPHILS NFR BLD: 83.4 % (ref 38–73)
NEUTROPHILS NFR BLD: 83.9 % (ref 38–73)
NEUTROPHILS NFR BLD: 88.2 % (ref 38–73)
NEUTROPHILS NFR BLD: 88.4 % (ref 38–73)
NEUTROPHILS NFR BLD: 90.6 % (ref 38–73)
NEUTROPHILS NFR BLD: 93.9 % (ref 38–73)
NITRITE UR QL STRIP: NEGATIVE
NITRITE UR QL STRIP: NEGATIVE
NRBC BLD-RTO: 0 /100 WBC
NUM UNITS TRANS PACKED RBC: NORMAL
PH UR STRIP: 7 [PH] (ref 5–8)
PH UR STRIP: >8 [PH] (ref 5–8)
PHOSPHATE SERPL-MCNC: 2 MG/DL (ref 2.7–4.5)
PHOSPHATE SERPL-MCNC: 2.3 MG/DL (ref 2.7–4.5)
PHOSPHATE SERPL-MCNC: 2.4 MG/DL (ref 2.7–4.5)
PHOSPHATE SERPL-MCNC: 2.9 MG/DL (ref 2.7–4.5)
PHOSPHATE SERPL-MCNC: 3 MG/DL (ref 2.7–4.5)
PHOSPHATE SERPL-MCNC: 3 MG/DL (ref 2.7–4.5)
PHOSPHATE SERPL-MCNC: 3.6 MG/DL (ref 2.7–4.5)
PISA TR MAX VEL: 2.74 M/S
PLATELET # BLD AUTO: 327 K/UL (ref 150–450)
PLATELET # BLD AUTO: 329 K/UL (ref 150–450)
PLATELET # BLD AUTO: 342 K/UL (ref 150–450)
PLATELET # BLD AUTO: 342 K/UL (ref 150–450)
PLATELET # BLD AUTO: 344 K/UL (ref 150–450)
PLATELET # BLD AUTO: 356 K/UL (ref 150–450)
PLATELET # BLD AUTO: 366 K/UL (ref 150–450)
PLATELET # BLD AUTO: 397 K/UL (ref 150–450)
PLATELET # BLD AUTO: 400 K/UL (ref 150–450)
PLATELET # BLD AUTO: 410 K/UL (ref 150–450)
PLATELET # BLD AUTO: 417 K/UL (ref 150–450)
PLATELET # BLD AUTO: 438 K/UL (ref 150–450)
PLATELET # BLD AUTO: 445 K/UL (ref 150–450)
PLATELET # BLD AUTO: 517 K/UL (ref 150–450)
PLATELET # BLD AUTO: 599 K/UL (ref 150–450)
PLATELET BLD QL SMEAR: ABNORMAL
PMV BLD AUTO: 8.7 FL (ref 9.2–12.9)
PMV BLD AUTO: 8.8 FL (ref 9.2–12.9)
PMV BLD AUTO: 8.8 FL (ref 9.2–12.9)
PMV BLD AUTO: 8.9 FL (ref 9.2–12.9)
PMV BLD AUTO: 9 FL (ref 9.2–12.9)
PMV BLD AUTO: 9.1 FL (ref 9.2–12.9)
PMV BLD AUTO: 9.1 FL (ref 9.2–12.9)
PMV BLD AUTO: 9.2 FL (ref 9.2–12.9)
PMV BLD AUTO: 9.4 FL (ref 9.2–12.9)
PMV BLD AUTO: 9.5 FL (ref 9.2–12.9)
PMV BLD AUTO: 9.8 FL (ref 9.2–12.9)
POTASSIUM SERPL-SCNC: 3.5 MMOL/L (ref 3.5–5.1)
POTASSIUM SERPL-SCNC: 3.6 MMOL/L (ref 3.5–5.1)
POTASSIUM SERPL-SCNC: 3.7 MMOL/L (ref 3.5–5.1)
POTASSIUM SERPL-SCNC: 3.7 MMOL/L (ref 3.5–5.1)
POTASSIUM SERPL-SCNC: 3.9 MMOL/L (ref 3.5–5.1)
POTASSIUM SERPL-SCNC: 4.1 MMOL/L (ref 3.5–5.1)
POTASSIUM SERPL-SCNC: 4.2 MMOL/L (ref 3.5–5.1)
POTASSIUM SERPL-SCNC: 4.3 MMOL/L (ref 3.5–5.1)
POTASSIUM SERPL-SCNC: 4.5 MMOL/L (ref 3.5–5.1)
POTASSIUM SERPL-SCNC: 4.5 MMOL/L (ref 3.5–5.1)
POTASSIUM SERPL-SCNC: 4.6 MMOL/L (ref 3.5–5.1)
POTASSIUM SERPL-SCNC: 4.7 MMOL/L (ref 3.5–5.1)
POTASSIUM SERPL-SCNC: 5.3 MMOL/L (ref 3.5–5.1)
PROT SERPL-MCNC: 4.6 G/DL (ref 6–8.4)
PROT SERPL-MCNC: 4.7 G/DL (ref 6–8.4)
PROT SERPL-MCNC: 4.8 G/DL (ref 6–8.4)
PROT SERPL-MCNC: 4.9 G/DL (ref 6–8.4)
PROT SERPL-MCNC: 4.9 G/DL (ref 6–8.4)
PROT SERPL-MCNC: 5.1 G/DL (ref 6–8.4)
PROT SERPL-MCNC: 5.5 G/DL (ref 6–8.4)
PROT SERPL-MCNC: 6.9 G/DL (ref 6–8.4)
PROT SERPL-MCNC: 7.6 G/DL (ref 6–8.4)
PROT UR QL STRIP: NEGATIVE
PROT UR QL STRIP: NEGATIVE
PULM VEIN S/D RATIO: 1.16
PV MEAN GRADIENT: 1.07 MMHG
PV MV: 0.68 M/S
PV PEAK D VEL: 0.38 M/S
PV PEAK S VEL: 0.44 M/S
PV PEAK VELOCITY: 0.87 CM/S
RA MAJOR: 3.64 CM
RA PRESSURE: 8 MMHG
RA WIDTH: 2.46 CM
RBC # BLD AUTO: 2.29 M/UL (ref 4.6–6.2)
RBC # BLD AUTO: 2.37 M/UL (ref 4.6–6.2)
RBC # BLD AUTO: 2.38 M/UL (ref 4.6–6.2)
RBC # BLD AUTO: 2.39 M/UL (ref 4.6–6.2)
RBC # BLD AUTO: 2.46 M/UL (ref 4.6–6.2)
RBC # BLD AUTO: 2.49 M/UL (ref 4.6–6.2)
RBC # BLD AUTO: 2.66 M/UL (ref 4.6–6.2)
RBC # BLD AUTO: 2.89 M/UL (ref 4.6–6.2)
RBC # BLD AUTO: 3.08 M/UL (ref 4.6–6.2)
RBC # BLD AUTO: 4.01 M/UL (ref 4.6–6.2)
RBC # BLD AUTO: 4.05 M/UL (ref 4.6–6.2)
RBC # BLD AUTO: 4.16 M/UL (ref 4.6–6.2)
RBC # BLD AUTO: 4.33 M/UL (ref 4.6–6.2)
RBC # BLD AUTO: 4.34 M/UL (ref 4.6–6.2)
RBC # BLD AUTO: 4.57 M/UL (ref 4.6–6.2)
RBC #/AREA URNS AUTO: 0 /HPF (ref 0–4)
RBC #/AREA URNS HPF: 3 /HPF (ref 0–4)
RIGHT VENTRICULAR END-DIASTOLIC DIMENSION: 3.09 CM
SARS-COV-2 RDRP RESP QL NAA+PROBE: NEGATIVE
SARS-COV-2 RDRP RESP QL NAA+PROBE: NEGATIVE
SINUS: 3.2 CM
SODIUM SERPL-SCNC: 132 MMOL/L (ref 136–145)
SODIUM SERPL-SCNC: 133 MMOL/L (ref 136–145)
SODIUM SERPL-SCNC: 134 MMOL/L (ref 136–145)
SODIUM SERPL-SCNC: 134 MMOL/L (ref 136–145)
SODIUM SERPL-SCNC: 135 MMOL/L (ref 136–145)
SODIUM SERPL-SCNC: 135 MMOL/L (ref 136–145)
SODIUM SERPL-SCNC: 136 MMOL/L (ref 136–145)
SODIUM SERPL-SCNC: 136 MMOL/L (ref 136–145)
SODIUM SERPL-SCNC: 137 MMOL/L (ref 136–145)
SODIUM SERPL-SCNC: 138 MMOL/L (ref 136–145)
SODIUM SERPL-SCNC: 140 MMOL/L (ref 136–145)
SP GR UR STRIP: 1.01 (ref 1–1.03)
SP GR UR STRIP: 1.01 (ref 1–1.03)
SPECIMEN OUTDATE: NORMAL
SPECIMEN SOURCE: NORMAL
SPECIMEN SOURCE: NORMAL
SQUAMOUS #/AREA URNS HPF: 1 /HPF
STJ: 2.88 CM
TDI LATERAL: 0.1 M/S
TDI SEPTAL: 0.11 M/S
TDI: 0.11 M/S
TR MAX PG: 30 MMHG
TROPONIN I SERPL DL<=0.01 NG/ML-MCNC: <0.006 NG/ML (ref 0–0.03)
TV REST PULMONARY ARTERY PRESSURE: 38 MMHG
URN SPEC COLLECT METH UR: ABNORMAL
URN SPEC COLLECT METH UR: ABNORMAL
UROBILINOGEN UR STRIP-ACNC: NEGATIVE EU/DL
WBC # BLD AUTO: 13.04 K/UL (ref 3.9–12.7)
WBC # BLD AUTO: 13.42 K/UL (ref 3.9–12.7)
WBC # BLD AUTO: 13.74 K/UL (ref 3.9–12.7)
WBC # BLD AUTO: 13.91 K/UL (ref 3.9–12.7)
WBC # BLD AUTO: 15.33 K/UL (ref 3.9–12.7)
WBC # BLD AUTO: 15.58 K/UL (ref 3.9–12.7)
WBC # BLD AUTO: 15.62 K/UL (ref 3.9–12.7)
WBC # BLD AUTO: 15.87 K/UL (ref 3.9–12.7)
WBC # BLD AUTO: 16.92 K/UL (ref 3.9–12.7)
WBC # BLD AUTO: 22.93 K/UL (ref 3.9–12.7)
WBC # BLD AUTO: 22.93 K/UL (ref 3.9–12.7)
WBC # BLD AUTO: 25.46 K/UL (ref 3.9–12.7)
WBC # BLD AUTO: 27.95 K/UL (ref 3.9–12.7)
WBC # BLD AUTO: 28.13 K/UL (ref 3.9–12.7)
WBC # BLD AUTO: 29.74 K/UL (ref 3.9–12.7)
WBC #/AREA URNS AUTO: 2 /HPF (ref 0–5)
WBC #/AREA URNS HPF: 6 /HPF (ref 0–5)

## 2023-01-01 PROCEDURE — 20000000 HC ICU ROOM

## 2023-01-01 PROCEDURE — 20600001 HC STEP DOWN PRIVATE ROOM

## 2023-01-01 PROCEDURE — 36415 COLL VENOUS BLD VENIPUNCTURE: CPT | Performed by: EMERGENCY MEDICINE

## 2023-01-01 PROCEDURE — 96375 TX/PRO/DX INJ NEW DRUG ADDON: CPT

## 2023-01-01 PROCEDURE — 27000221 HC OXYGEN, UP TO 24 HOURS

## 2023-01-01 PROCEDURE — 25000003 PHARM REV CODE 250: Performed by: PHYSICIAN ASSISTANT

## 2023-01-01 PROCEDURE — 94761 N-INVAS EAR/PLS OXIMETRY MLT: CPT

## 2023-01-01 PROCEDURE — U0002 COVID-19 LAB TEST NON-CDC: HCPCS | Performed by: EMERGENCY MEDICINE

## 2023-01-01 PROCEDURE — 63600175 PHARM REV CODE 636 W HCPCS: Performed by: PHYSICIAN ASSISTANT

## 2023-01-01 PROCEDURE — S4991 NICOTINE PATCH NONLEGEND: HCPCS | Performed by: EMERGENCY MEDICINE

## 2023-01-01 PROCEDURE — 86920 COMPATIBILITY TEST SPIN: CPT | Performed by: INTERNAL MEDICINE

## 2023-01-01 PROCEDURE — 99232 SBSQ HOSP IP/OBS MODERATE 35: CPT | Mod: 95,,, | Performed by: PHYSICIAN ASSISTANT

## 2023-01-01 PROCEDURE — 25000003 PHARM REV CODE 250

## 2023-01-01 PROCEDURE — 85025 COMPLETE CBC W/AUTO DIFF WBC: CPT | Performed by: NURSE PRACTITIONER

## 2023-01-01 PROCEDURE — 99900035 HC TECH TIME PER 15 MIN (STAT)

## 2023-01-01 PROCEDURE — 36415 COLL VENOUS BLD VENIPUNCTURE: CPT | Performed by: PHYSICIAN ASSISTANT

## 2023-01-01 PROCEDURE — 94640 AIRWAY INHALATION TREATMENT: CPT

## 2023-01-01 PROCEDURE — 99232 SBSQ HOSP IP/OBS MODERATE 35: CPT | Mod: ,,, | Performed by: FAMILY MEDICINE

## 2023-01-01 PROCEDURE — 96372 THER/PROPH/DIAG INJ SC/IM: CPT | Performed by: STUDENT IN AN ORGANIZED HEALTH CARE EDUCATION/TRAINING PROGRAM

## 2023-01-01 PROCEDURE — 25000003 PHARM REV CODE 250: Performed by: FAMILY MEDICINE

## 2023-01-01 PROCEDURE — 25000242 PHARM REV CODE 250 ALT 637 W/ HCPCS: Performed by: FAMILY MEDICINE

## 2023-01-01 PROCEDURE — 25000242 PHARM REV CODE 250 ALT 637 W/ HCPCS

## 2023-01-01 PROCEDURE — 99233 SBSQ HOSP IP/OBS HIGH 50: CPT | Mod: ,,, | Performed by: INTERNAL MEDICINE

## 2023-01-01 PROCEDURE — 96365 THER/PROPH/DIAG IV INF INIT: CPT

## 2023-01-01 PROCEDURE — 36415 COLL VENOUS BLD VENIPUNCTURE: CPT | Performed by: NURSE PRACTITIONER

## 2023-01-01 PROCEDURE — 84100 ASSAY OF PHOSPHORUS: CPT | Performed by: NURSE PRACTITIONER

## 2023-01-01 PROCEDURE — 96366 THER/PROPH/DIAG IV INF ADDON: CPT

## 2023-01-01 PROCEDURE — 63600175 PHARM REV CODE 636 W HCPCS: Performed by: FAMILY MEDICINE

## 2023-01-01 PROCEDURE — 93010 EKG 12-LEAD: ICD-10-PCS | Mod: ,,, | Performed by: INTERNAL MEDICINE

## 2023-01-01 PROCEDURE — 85025 COMPLETE CBC W/AUTO DIFF WBC: CPT | Performed by: PHYSICIAN ASSISTANT

## 2023-01-01 PROCEDURE — 99232 PR SUBSEQUENT HOSPITAL CARE,LEVL II: ICD-10-PCS | Mod: ,,, | Performed by: FAMILY MEDICINE

## 2023-01-01 PROCEDURE — 27100171 HC OXYGEN HIGH FLOW UP TO 24 HOURS

## 2023-01-01 PROCEDURE — 83735 ASSAY OF MAGNESIUM: CPT | Performed by: NURSE PRACTITIONER

## 2023-01-01 PROCEDURE — 36415 COLL VENOUS BLD VENIPUNCTURE: CPT | Performed by: INTERNAL MEDICINE

## 2023-01-01 PROCEDURE — 85025 COMPLETE CBC W/AUTO DIFF WBC: CPT | Performed by: INTERNAL MEDICINE

## 2023-01-01 PROCEDURE — 99238 PR HOSPITAL DISCHARGE DAY,<30 MIN: ICD-10-PCS | Mod: ,,, | Performed by: FAMILY MEDICINE

## 2023-01-01 PROCEDURE — 84100 ASSAY OF PHOSPHORUS: CPT | Performed by: INTERNAL MEDICINE

## 2023-01-01 PROCEDURE — 94668 MNPJ CHEST WALL SBSQ: CPT

## 2023-01-01 PROCEDURE — 99238 HOSP IP/OBS DSCHRG MGMT 30/<: CPT | Mod: ,,, | Performed by: FAMILY MEDICINE

## 2023-01-01 PROCEDURE — 25000003 PHARM REV CODE 250: Performed by: NURSE PRACTITIONER

## 2023-01-01 PROCEDURE — 99233 SBSQ HOSP IP/OBS HIGH 50: CPT | Mod: 95,,, | Performed by: PHYSICIAN ASSISTANT

## 2023-01-01 PROCEDURE — 93010 ELECTROCARDIOGRAM REPORT: CPT | Mod: ,,, | Performed by: INTERNAL MEDICINE

## 2023-01-01 PROCEDURE — 80053 COMPREHEN METABOLIC PANEL: CPT | Performed by: NURSE PRACTITIONER

## 2023-01-01 PROCEDURE — 80053 COMPREHEN METABOLIC PANEL: CPT | Performed by: INTERNAL MEDICINE

## 2023-01-01 PROCEDURE — 87502 INFLUENZA DNA AMP PROBE: CPT | Performed by: EMERGENCY MEDICINE

## 2023-01-01 PROCEDURE — 87040 BLOOD CULTURE FOR BACTERIA: CPT | Performed by: EMERGENCY MEDICINE

## 2023-01-01 PROCEDURE — 25000242 PHARM REV CODE 250 ALT 637 W/ HCPCS: Performed by: EMERGENCY MEDICINE

## 2023-01-01 PROCEDURE — 85025 COMPLETE CBC W/AUTO DIFF WBC: CPT | Mod: 91 | Performed by: INTERNAL MEDICINE

## 2023-01-01 PROCEDURE — 99239 PR HOSPITAL DISCHARGE DAY,>30 MIN: ICD-10-PCS | Mod: ,,, | Performed by: INTERNAL MEDICINE

## 2023-01-01 PROCEDURE — 25000003 PHARM REV CODE 250: Performed by: INTERNAL MEDICINE

## 2023-01-01 PROCEDURE — 85025 COMPLETE CBC W/AUTO DIFF WBC: CPT | Mod: 91 | Performed by: NURSE PRACTITIONER

## 2023-01-01 PROCEDURE — 83880 ASSAY OF NATRIURETIC PEPTIDE: CPT | Performed by: EMERGENCY MEDICINE

## 2023-01-01 PROCEDURE — 25000242 PHARM REV CODE 250 ALT 637 W/ HCPCS: Performed by: NURSE PRACTITIONER

## 2023-01-01 PROCEDURE — 80053 COMPREHEN METABOLIC PANEL: CPT | Performed by: EMERGENCY MEDICINE

## 2023-01-01 PROCEDURE — 63600175 PHARM REV CODE 636 W HCPCS: Performed by: EMERGENCY MEDICINE

## 2023-01-01 PROCEDURE — 25000003 PHARM REV CODE 250: Performed by: STUDENT IN AN ORGANIZED HEALTH CARE EDUCATION/TRAINING PROGRAM

## 2023-01-01 PROCEDURE — 99291 CRITICAL CARE FIRST HOUR: CPT | Mod: ,,, | Performed by: NURSE PRACTITIONER

## 2023-01-01 PROCEDURE — 25000003 PHARM REV CODE 250: Performed by: EMERGENCY MEDICINE

## 2023-01-01 PROCEDURE — 99223 1ST HOSP IP/OBS HIGH 75: CPT | Mod: 25,,, | Performed by: NURSE PRACTITIONER

## 2023-01-01 PROCEDURE — 11000001 HC ACUTE MED/SURG PRIVATE ROOM

## 2023-01-01 PROCEDURE — 99223 1ST HOSP IP/OBS HIGH 75: CPT | Mod: GC,,, | Performed by: EMERGENCY MEDICINE

## 2023-01-01 PROCEDURE — 63600175 PHARM REV CODE 636 W HCPCS: Performed by: INTERNAL MEDICINE

## 2023-01-01 PROCEDURE — 85018 HEMOGLOBIN: CPT | Performed by: INTERNAL MEDICINE

## 2023-01-01 PROCEDURE — 84484 ASSAY OF TROPONIN QUANT: CPT | Performed by: EMERGENCY MEDICINE

## 2023-01-01 PROCEDURE — 99223 PR INITIAL HOSPITAL CARE,LEVL III: ICD-10-PCS | Mod: 25,,, | Performed by: NURSE PRACTITIONER

## 2023-01-01 PROCEDURE — 99233 PR SUBSEQUENT HOSPITAL CARE,LEVL III: ICD-10-PCS | Mod: ,,, | Performed by: HOSPITALIST

## 2023-01-01 PROCEDURE — 99239 HOSP IP/OBS DSCHRG MGMT >30: CPT | Mod: ,,, | Performed by: INTERNAL MEDICINE

## 2023-01-01 PROCEDURE — 85014 HEMATOCRIT: CPT | Performed by: INTERNAL MEDICINE

## 2023-01-01 PROCEDURE — 99233 SBSQ HOSP IP/OBS HIGH 50: CPT | Mod: ,,, | Performed by: HOSPITALIST

## 2023-01-01 PROCEDURE — P9016 RBC LEUKOCYTES REDUCED: HCPCS | Performed by: INTERNAL MEDICINE

## 2023-01-01 PROCEDURE — 99900031 HC PATIENT EDUCATION (STAT)

## 2023-01-01 PROCEDURE — 99223 1ST HOSP IP/OBS HIGH 75: CPT | Mod: ,,, | Performed by: INTERNAL MEDICINE

## 2023-01-01 PROCEDURE — S4991 NICOTINE PATCH NONLEGEND: HCPCS | Performed by: NURSE PRACTITIONER

## 2023-01-01 PROCEDURE — 99284 EMERGENCY DEPT VISIT MOD MDM: CPT

## 2023-01-01 PROCEDURE — 30905 PR CTRL 2SEBLEED,POST,W/PACKS &/OR CAUT: ICD-10-PCS | Mod: ,,, | Performed by: STUDENT IN AN ORGANIZED HEALTH CARE EDUCATION/TRAINING PROGRAM

## 2023-01-01 PROCEDURE — 63700000 PHARM REV CODE 250 ALT 637 W/O HCPCS: Performed by: INTERNAL MEDICINE

## 2023-01-01 PROCEDURE — 99291 CRITICAL CARE FIRST HOUR: CPT | Mod: GC,,, | Performed by: INTERNAL MEDICINE

## 2023-01-01 PROCEDURE — 85025 COMPLETE CBC W/AUTO DIFF WBC: CPT | Performed by: EMERGENCY MEDICINE

## 2023-01-01 PROCEDURE — 63600175 PHARM REV CODE 636 W HCPCS: Performed by: NURSE PRACTITIONER

## 2023-01-01 PROCEDURE — 99232 SBSQ HOSP IP/OBS MODERATE 35: CPT | Mod: ,,, | Performed by: PHYSICIAN ASSISTANT

## 2023-01-01 PROCEDURE — 96367 TX/PROPH/DG ADDL SEQ IV INF: CPT

## 2023-01-01 PROCEDURE — 96372 THER/PROPH/DIAG INJ SC/IM: CPT | Performed by: EMERGENCY MEDICINE

## 2023-01-01 PROCEDURE — 99223 PR INITIAL HOSPITAL CARE,LEVL III: ICD-10-PCS | Mod: GC,,, | Performed by: EMERGENCY MEDICINE

## 2023-01-01 PROCEDURE — 99222 PR INITIAL HOSPITAL CARE,LEVL II: ICD-10-PCS | Mod: ,,, | Performed by: INTERNAL MEDICINE

## 2023-01-01 PROCEDURE — 25000242 PHARM REV CODE 250 ALT 637 W/ HCPCS: Performed by: PHYSICIAN ASSISTANT

## 2023-01-01 PROCEDURE — 99285 EMERGENCY DEPT VISIT HI MDM: CPT | Mod: 25

## 2023-01-01 PROCEDURE — 99232 PR SUBSEQUENT HOSPITAL CARE,LEVL II: ICD-10-PCS | Mod: ,,, | Performed by: PHYSICIAN ASSISTANT

## 2023-01-01 PROCEDURE — 80048 BASIC METABOLIC PNL TOTAL CA: CPT | Performed by: PHYSICIAN ASSISTANT

## 2023-01-01 PROCEDURE — 99233 PR SUBSEQUENT HOSPITAL CARE,LEVL III: ICD-10-PCS | Mod: ,,, | Performed by: INTERNAL MEDICINE

## 2023-01-01 PROCEDURE — 99222 1ST HOSP IP/OBS MODERATE 55: CPT | Mod: ,,, | Performed by: INTERNAL MEDICINE

## 2023-01-01 PROCEDURE — 85379 FIBRIN DEGRADATION QUANT: CPT | Performed by: PHYSICIAN ASSISTANT

## 2023-01-01 PROCEDURE — 81001 URINALYSIS AUTO W/SCOPE: CPT | Performed by: INTERNAL MEDICINE

## 2023-01-01 PROCEDURE — 25000242 PHARM REV CODE 250 ALT 637 W/ HCPCS: Performed by: INTERNAL MEDICINE

## 2023-01-01 PROCEDURE — 84484 ASSAY OF TROPONIN QUANT: CPT | Performed by: PHYSICIAN ASSISTANT

## 2023-01-01 PROCEDURE — 81000 URINALYSIS NONAUTO W/SCOPE: CPT | Performed by: EMERGENCY MEDICINE

## 2023-01-01 PROCEDURE — 93005 ELECTROCARDIOGRAM TRACING: CPT

## 2023-01-01 PROCEDURE — 99233 PR SUBSEQUENT HOSPITAL CARE,LEVL III: ICD-10-PCS | Mod: 95,,, | Performed by: PHYSICIAN ASSISTANT

## 2023-01-01 PROCEDURE — 94760 N-INVAS EAR/PLS OXIMETRY 1: CPT

## 2023-01-01 PROCEDURE — 94667 MNPJ CHEST WALL 1ST: CPT

## 2023-01-01 PROCEDURE — 99239 PR HOSPITAL DISCHARGE DAY,>30 MIN: ICD-10-PCS | Mod: ,,, | Performed by: FAMILY MEDICINE

## 2023-01-01 PROCEDURE — 30905 CONTROL OF NOSEBLEED: CPT | Mod: ,,, | Performed by: STUDENT IN AN ORGANIZED HEALTH CARE EDUCATION/TRAINING PROGRAM

## 2023-01-01 PROCEDURE — 99232 PR SUBSEQUENT HOSPITAL CARE,LEVL II: ICD-10-PCS | Mod: 95,,, | Performed by: PHYSICIAN ASSISTANT

## 2023-01-01 PROCEDURE — 99223 PR INITIAL HOSPITAL CARE,LEVL III: ICD-10-PCS | Mod: ,,, | Performed by: INTERNAL MEDICINE

## 2023-01-01 PROCEDURE — 86900 BLOOD TYPING SEROLOGIC ABO: CPT | Performed by: INTERNAL MEDICINE

## 2023-01-01 PROCEDURE — 63600175 PHARM REV CODE 636 W HCPCS: Performed by: STUDENT IN AN ORGANIZED HEALTH CARE EDUCATION/TRAINING PROGRAM

## 2023-01-01 PROCEDURE — 83605 ASSAY OF LACTIC ACID: CPT | Performed by: EMERGENCY MEDICINE

## 2023-01-01 PROCEDURE — 99291 PR CRITICAL CARE, E/M 30-74 MINUTES: ICD-10-PCS | Mod: GC,,, | Performed by: INTERNAL MEDICINE

## 2023-01-01 PROCEDURE — 36430 TRANSFUSION BLD/BLD COMPNT: CPT

## 2023-01-01 PROCEDURE — S4991 NICOTINE PATCH NONLEGEND: HCPCS | Performed by: INTERNAL MEDICINE

## 2023-01-01 PROCEDURE — 99291 PR CRITICAL CARE, E/M 30-74 MINUTES: ICD-10-PCS | Mod: ,,, | Performed by: NURSE PRACTITIONER

## 2023-01-01 PROCEDURE — 99239 HOSP IP/OBS DSCHRG MGMT >30: CPT | Mod: ,,, | Performed by: FAMILY MEDICINE

## 2023-01-01 PROCEDURE — 99222 1ST HOSP IP/OBS MODERATE 55: CPT | Mod: 25,,, | Performed by: STUDENT IN AN ORGANIZED HEALTH CARE EDUCATION/TRAINING PROGRAM

## 2023-01-01 PROCEDURE — 83735 ASSAY OF MAGNESIUM: CPT | Performed by: EMERGENCY MEDICINE

## 2023-01-01 PROCEDURE — 99222 PR INITIAL HOSPITAL CARE,LEVL II: ICD-10-PCS | Mod: 25,,, | Performed by: STUDENT IN AN ORGANIZED HEALTH CARE EDUCATION/TRAINING PROGRAM

## 2023-01-01 RX ORDER — SODIUM CHLORIDE 9 MG/ML
INJECTION, SOLUTION INTRAVENOUS CONTINUOUS
Status: ACTIVE | OUTPATIENT
Start: 2023-01-01 | End: 2023-01-01

## 2023-01-01 RX ORDER — IBUPROFEN 200 MG
1 TABLET ORAL DAILY
Qty: 28 PATCH | Refills: 11 | Status: SHIPPED | OUTPATIENT
Start: 2023-01-01

## 2023-01-01 RX ORDER — TALC
6 POWDER (GRAM) TOPICAL NIGHTLY PRN
Status: DISCONTINUED | OUTPATIENT
Start: 2023-01-01 | End: 2023-01-01 | Stop reason: HOSPADM

## 2023-01-01 RX ORDER — POLYETHYLENE GLYCOL 3350 17 G/17G
17 POWDER, FOR SOLUTION ORAL DAILY
Status: DISCONTINUED | OUTPATIENT
Start: 2023-01-01 | End: 2023-01-01 | Stop reason: HOSPADM

## 2023-01-01 RX ORDER — AMLODIPINE BESYLATE 10 MG/1
10 TABLET ORAL DAILY
Status: DISCONTINUED | OUTPATIENT
Start: 2023-01-01 | End: 2023-01-01 | Stop reason: HOSPADM

## 2023-01-01 RX ORDER — IPRATROPIUM BROMIDE AND ALBUTEROL SULFATE 2.5; .5 MG/3ML; MG/3ML
3 SOLUTION RESPIRATORY (INHALATION)
Status: COMPLETED | OUTPATIENT
Start: 2023-01-01 | End: 2023-01-01

## 2023-01-01 RX ORDER — TRAMADOL HYDROCHLORIDE 50 MG/1
50 TABLET ORAL EVERY 6 HOURS
Status: DISCONTINUED | OUTPATIENT
Start: 2023-01-01 | End: 2023-01-01

## 2023-01-01 RX ORDER — PANTOPRAZOLE SODIUM 40 MG/1
40 TABLET, DELAYED RELEASE ORAL DAILY
Status: DISCONTINUED | OUTPATIENT
Start: 2023-01-01 | End: 2023-01-01 | Stop reason: HOSPADM

## 2023-01-01 RX ORDER — FLUTICASONE FUROATE AND VILANTEROL 100; 25 UG/1; UG/1
1 POWDER RESPIRATORY (INHALATION) DAILY
Status: DISCONTINUED | OUTPATIENT
Start: 2023-01-01 | End: 2023-01-01 | Stop reason: HOSPADM

## 2023-01-01 RX ORDER — OXYMETAZOLINE HCL 0.05 %
2 SPRAY, NON-AEROSOL (ML) NASAL
Status: DISPENSED | OUTPATIENT
Start: 2023-01-01 | End: 2023-01-01

## 2023-01-01 RX ORDER — AZITHROMYCIN 250 MG/1
500 TABLET, FILM COATED ORAL ONCE
Status: COMPLETED | OUTPATIENT
Start: 2023-01-01 | End: 2023-01-01

## 2023-01-01 RX ORDER — ALBUTEROL SULFATE 2.5 MG/.5ML
2.5 SOLUTION RESPIRATORY (INHALATION)
Status: DISCONTINUED | OUTPATIENT
Start: 2023-01-01 | End: 2023-01-01

## 2023-01-01 RX ORDER — BUSPIRONE HYDROCHLORIDE 5 MG/1
15 TABLET ORAL 3 TIMES DAILY
Qty: 270 TABLET | Refills: 0
Start: 2023-01-01 | End: 2023-01-01 | Stop reason: SDUPTHER

## 2023-01-01 RX ORDER — TRAMADOL HYDROCHLORIDE 50 MG/1
50 TABLET ORAL EVERY 6 HOURS PRN
Status: DISCONTINUED | OUTPATIENT
Start: 2023-01-01 | End: 2023-01-01

## 2023-01-01 RX ORDER — SODIUM CHLORIDE 9 MG/ML
INJECTION, SOLUTION INTRAVENOUS CONTINUOUS
Status: DISCONTINUED | OUTPATIENT
Start: 2023-01-01 | End: 2023-01-01

## 2023-01-01 RX ORDER — METHYLPREDNISOLONE SOD SUCC 125 MG
125 VIAL (EA) INJECTION
Status: COMPLETED | OUTPATIENT
Start: 2023-01-01 | End: 2023-01-01

## 2023-01-01 RX ORDER — MAGNESIUM SULFATE HEPTAHYDRATE 40 MG/ML
2 INJECTION, SOLUTION INTRAVENOUS ONCE
Status: COMPLETED | OUTPATIENT
Start: 2023-01-01 | End: 2023-01-01

## 2023-01-01 RX ORDER — TRAMADOL HYDROCHLORIDE 50 MG/1
100 TABLET ORAL EVERY 6 HOURS PRN
Status: DISCONTINUED | OUTPATIENT
Start: 2023-01-01 | End: 2023-01-01

## 2023-01-01 RX ORDER — ONDANSETRON 4 MG/1
4 TABLET, ORALLY DISINTEGRATING ORAL
Status: COMPLETED | OUTPATIENT
Start: 2023-01-01 | End: 2023-01-01

## 2023-01-01 RX ORDER — CARVEDILOL 25 MG/1
25 TABLET ORAL 2 TIMES DAILY WITH MEALS
Status: DISCONTINUED | OUTPATIENT
Start: 2023-01-01 | End: 2023-01-01 | Stop reason: HOSPADM

## 2023-01-01 RX ORDER — SODIUM CHLORIDE 1 G/1
2000 TABLET ORAL 3 TIMES DAILY
Status: DISCONTINUED | OUTPATIENT
Start: 2023-01-01 | End: 2023-01-01 | Stop reason: HOSPADM

## 2023-01-01 RX ORDER — TRAMADOL HYDROCHLORIDE 50 MG/1
100 TABLET ORAL EVERY 6 HOURS PRN
Status: DISCONTINUED | OUTPATIENT
Start: 2023-01-01 | End: 2023-01-01 | Stop reason: HOSPADM

## 2023-01-01 RX ORDER — MAGNESIUM SULFATE HEPTAHYDRATE 40 MG/ML
2 INJECTION, SOLUTION INTRAVENOUS
Status: COMPLETED | OUTPATIENT
Start: 2023-01-01 | End: 2023-01-01

## 2023-01-01 RX ORDER — GLUCAGON 1 MG
1 KIT INJECTION
Status: DISCONTINUED | OUTPATIENT
Start: 2023-01-01 | End: 2023-01-01 | Stop reason: HOSPADM

## 2023-01-01 RX ORDER — PREDNISONE 20 MG/1
40 TABLET ORAL DAILY
Status: DISCONTINUED | OUTPATIENT
Start: 2023-01-01 | End: 2023-01-01 | Stop reason: HOSPADM

## 2023-01-01 RX ORDER — CIPROFLOXACIN 250 MG/1
250 TABLET, FILM COATED ORAL EVERY 12 HOURS
Qty: 14 TABLET | Refills: 0 | Status: SHIPPED | OUTPATIENT
Start: 2023-01-01 | End: 2023-01-01

## 2023-01-01 RX ORDER — NAPROXEN SODIUM 220 MG/1
81 TABLET, FILM COATED ORAL DAILY
Status: DISCONTINUED | OUTPATIENT
Start: 2023-01-01 | End: 2023-01-01 | Stop reason: HOSPADM

## 2023-01-01 RX ORDER — IPRATROPIUM BROMIDE AND ALBUTEROL SULFATE 2.5; .5 MG/3ML; MG/3ML
3 SOLUTION RESPIRATORY (INHALATION) EVERY 4 HOURS
Status: DISCONTINUED | OUTPATIENT
Start: 2023-01-01 | End: 2023-01-01 | Stop reason: HOSPADM

## 2023-01-01 RX ORDER — TRAMADOL HYDROCHLORIDE 50 MG/1
50 TABLET ORAL EVERY 8 HOURS PRN
Status: DISCONTINUED | OUTPATIENT
Start: 2023-01-01 | End: 2023-01-01

## 2023-01-01 RX ORDER — ATORVASTATIN CALCIUM 40 MG/1
40 TABLET, FILM COATED ORAL DAILY
Status: DISCONTINUED | OUTPATIENT
Start: 2023-01-01 | End: 2023-01-01 | Stop reason: HOSPADM

## 2023-01-01 RX ORDER — IBUPROFEN 200 MG
1 TABLET ORAL DAILY
Status: DISCONTINUED | OUTPATIENT
Start: 2023-01-01 | End: 2023-01-01 | Stop reason: HOSPADM

## 2023-01-01 RX ORDER — TRAMADOL HYDROCHLORIDE 50 MG/1
100 TABLET ORAL 2 TIMES DAILY
Status: DISCONTINUED | OUTPATIENT
Start: 2023-01-01 | End: 2023-01-01

## 2023-01-01 RX ORDER — TRAMADOL HYDROCHLORIDE 100 MG/1
100 TABLET, COATED ORAL EVERY 6 HOURS PRN
Start: 2023-01-01 | End: 2023-01-01 | Stop reason: SDUPTHER

## 2023-01-01 RX ORDER — NOREPINEPHRINE BITARTRATE 1 MG/ML
INJECTION, SOLUTION INTRAVENOUS
Status: COMPLETED
Start: 2023-01-01 | End: 2023-01-01

## 2023-01-01 RX ORDER — GUAIFENESIN 600 MG/1
600 TABLET, EXTENDED RELEASE ORAL 2 TIMES DAILY
Status: DISCONTINUED | OUTPATIENT
Start: 2023-01-01 | End: 2023-01-01 | Stop reason: HOSPADM

## 2023-01-01 RX ORDER — AMOXICILLIN 250 MG
1 CAPSULE ORAL ONCE
Status: COMPLETED | OUTPATIENT
Start: 2023-01-01 | End: 2023-01-01

## 2023-01-01 RX ORDER — METHYLPREDNISOLONE SOD SUCC 125 MG
125 VIAL (EA) INJECTION EVERY 8 HOURS
Status: DISCONTINUED | OUTPATIENT
Start: 2023-01-01 | End: 2023-01-01

## 2023-01-01 RX ORDER — DULOXETIN HYDROCHLORIDE 20 MG/1
20 CAPSULE, DELAYED RELEASE ORAL DAILY
Status: DISCONTINUED | OUTPATIENT
Start: 2023-01-01 | End: 2023-01-01 | Stop reason: HOSPADM

## 2023-01-01 RX ORDER — ALBUTEROL SULFATE 2.5 MG/.5ML
2.5 SOLUTION RESPIRATORY (INHALATION) EVERY 6 HOURS PRN
Status: DISCONTINUED | OUTPATIENT
Start: 2023-01-01 | End: 2023-01-01

## 2023-01-01 RX ORDER — NICOTINE 7MG/24HR
1 PATCH, TRANSDERMAL 24 HOURS TRANSDERMAL DAILY
Status: DISCONTINUED | OUTPATIENT
Start: 2023-01-01 | End: 2023-01-01

## 2023-01-01 RX ORDER — MORPHINE SULFATE 2 MG/ML
6 INJECTION, SOLUTION INTRAMUSCULAR; INTRAVENOUS
Status: COMPLETED | OUTPATIENT
Start: 2023-01-01 | End: 2023-01-01

## 2023-01-01 RX ORDER — HYDROCHLOROTHIAZIDE 12.5 MG/1
12.5 TABLET ORAL DAILY
Status: DISCONTINUED | OUTPATIENT
Start: 2023-01-01 | End: 2023-01-01 | Stop reason: HOSPADM

## 2023-01-01 RX ORDER — SODIUM CHLORIDE 0.9 % (FLUSH) 0.9 %
10 SYRINGE (ML) INJECTION
Status: DISCONTINUED | OUTPATIENT
Start: 2023-01-01 | End: 2023-01-01 | Stop reason: HOSPADM

## 2023-01-01 RX ORDER — ACETYLCYSTEINE 200 MG/ML
2 SOLUTION ORAL; RESPIRATORY (INHALATION) ONCE
Status: COMPLETED | OUTPATIENT
Start: 2023-01-01 | End: 2023-01-01

## 2023-01-01 RX ORDER — METHYLPREDNISOLONE 4 MG/1
TABLET ORAL
Qty: 21 EACH | Refills: 0 | Status: SHIPPED | OUTPATIENT
Start: 2023-01-01 | End: 2023-01-01

## 2023-01-01 RX ORDER — GUAIFENESIN 600 MG/1
600 TABLET, EXTENDED RELEASE ORAL 2 TIMES DAILY
Qty: 20 TABLET | Refills: 0 | Status: SHIPPED | OUTPATIENT
Start: 2023-01-01 | End: 2023-01-01

## 2023-01-01 RX ORDER — CARVEDILOL 25 MG/1
25 TABLET ORAL 2 TIMES DAILY
Status: DISCONTINUED | OUTPATIENT
Start: 2023-01-01 | End: 2023-01-01

## 2023-01-01 RX ORDER — IBUPROFEN 200 MG
16 TABLET ORAL
Status: DISCONTINUED | OUTPATIENT
Start: 2023-01-01 | End: 2023-01-01 | Stop reason: HOSPADM

## 2023-01-01 RX ORDER — HYDROCODONE BITARTRATE AND ACETAMINOPHEN 10; 325 MG/1; MG/1
1 TABLET ORAL EVERY 4 HOURS PRN
Status: DISCONTINUED | OUTPATIENT
Start: 2023-01-01 | End: 2023-01-01

## 2023-01-01 RX ORDER — DULOXETIN HYDROCHLORIDE 20 MG/1
20 CAPSULE, DELAYED RELEASE ORAL DAILY
Qty: 30 CAPSULE | Refills: 11 | Status: SHIPPED | OUTPATIENT
Start: 2023-01-01 | End: 2023-01-01

## 2023-01-01 RX ORDER — HYDROCODONE BITARTRATE AND ACETAMINOPHEN 10; 325 MG/1; MG/1
1 TABLET ORAL EVERY 4 HOURS PRN
Status: DISCONTINUED | OUTPATIENT
Start: 2023-01-01 | End: 2023-01-01 | Stop reason: HOSPADM

## 2023-01-01 RX ORDER — HYDROCHLOROTHIAZIDE 12.5 MG/1
12.5 TABLET ORAL DAILY
Qty: 30 TABLET | Refills: 11 | Status: ON HOLD | OUTPATIENT
Start: 2023-01-01 | End: 2023-01-01 | Stop reason: HOSPADM

## 2023-01-01 RX ORDER — NICOTINE 7MG/24HR
1 PATCH, TRANSDERMAL 24 HOURS TRANSDERMAL DAILY
Qty: 14 PATCH | Refills: 0 | Status: ON HOLD | OUTPATIENT
Start: 2023-01-01 | End: 2023-01-01 | Stop reason: ALTCHOICE

## 2023-01-01 RX ORDER — IBUPROFEN 200 MG
24 TABLET ORAL
Status: DISCONTINUED | OUTPATIENT
Start: 2023-01-01 | End: 2023-01-01 | Stop reason: HOSPADM

## 2023-01-01 RX ORDER — FUROSEMIDE 10 MG/ML
INJECTION INTRAMUSCULAR; INTRAVENOUS
Status: COMPLETED
Start: 2023-01-01 | End: 2023-01-01

## 2023-01-01 RX ORDER — LEVALBUTEROL 1.25 MG/.5ML
1.25 SOLUTION, CONCENTRATE RESPIRATORY (INHALATION) EVERY 8 HOURS PRN
Status: DISCONTINUED | OUTPATIENT
Start: 2023-01-01 | End: 2023-01-01

## 2023-01-01 RX ORDER — NOREPINEPHRINE BITARTRATE/D5W 4MG/250ML
PLASTIC BAG, INJECTION (ML) INTRAVENOUS
Status: COMPLETED
Start: 2023-01-01 | End: 2023-01-01

## 2023-01-01 RX ORDER — ATORVASTATIN CALCIUM 40 MG/1
40 TABLET, FILM COATED ORAL DAILY
Status: DISCONTINUED | OUTPATIENT
Start: 2023-01-01 | End: 2023-01-01

## 2023-01-01 RX ORDER — ALBUTEROL SULFATE 2.5 MG/.5ML
2.5 SOLUTION RESPIRATORY (INHALATION) EVERY 6 HOURS PRN
Status: DISCONTINUED | OUTPATIENT
Start: 2023-01-01 | End: 2023-01-01 | Stop reason: HOSPADM

## 2023-01-01 RX ORDER — BUSPIRONE HYDROCHLORIDE 5 MG/1
15 TABLET ORAL 3 TIMES DAILY
Status: DISCONTINUED | OUTPATIENT
Start: 2023-01-01 | End: 2023-01-01 | Stop reason: HOSPADM

## 2023-01-01 RX ORDER — NALOXONE HCL 0.4 MG/ML
0.02 VIAL (ML) INJECTION
Status: DISCONTINUED | OUTPATIENT
Start: 2023-01-01 | End: 2023-01-01 | Stop reason: HOSPADM

## 2023-01-01 RX ORDER — HYDROCHLOROTHIAZIDE 12.5 MG/1
12.5 TABLET ORAL DAILY
Status: DISCONTINUED | OUTPATIENT
Start: 2023-01-01 | End: 2023-01-01

## 2023-01-01 RX ORDER — TRAMADOL HYDROCHLORIDE 50 MG/1
50 TABLET ORAL 3 TIMES DAILY
Status: DISCONTINUED | OUTPATIENT
Start: 2023-01-01 | End: 2023-01-01 | Stop reason: HOSPADM

## 2023-01-01 RX ORDER — PREDNISONE 5 MG/1
TABLET ORAL
Qty: 47 TABLET | Refills: 0 | Status: SHIPPED | OUTPATIENT
Start: 2023-01-01 | End: 2023-01-01

## 2023-01-01 RX ORDER — LEVOFLOXACIN 5 MG/ML
750 INJECTION, SOLUTION INTRAVENOUS
Status: COMPLETED | OUTPATIENT
Start: 2023-01-01 | End: 2023-01-01

## 2023-01-01 RX ORDER — HYDROMORPHONE HYDROCHLORIDE 1 MG/ML
1 INJECTION, SOLUTION INTRAMUSCULAR; INTRAVENOUS; SUBCUTANEOUS
Status: COMPLETED | OUTPATIENT
Start: 2023-01-01 | End: 2023-01-01

## 2023-01-01 RX ORDER — LEVOFLOXACIN 5 MG/ML
500 INJECTION, SOLUTION INTRAVENOUS
Status: DISCONTINUED | OUTPATIENT
Start: 2023-01-01 | End: 2023-01-01

## 2023-01-01 RX ORDER — METHYLPREDNISOLONE SOD SUCC 125 MG
125 VIAL (EA) INJECTION ONCE
Status: COMPLETED | OUTPATIENT
Start: 2023-01-01 | End: 2023-01-01

## 2023-01-01 RX ORDER — OXYMETAZOLINE HCL 0.05 %
2 SPRAY, NON-AEROSOL (ML) NASAL EVERY 8 HOURS
Status: COMPLETED | OUTPATIENT
Start: 2023-01-01 | End: 2023-01-01

## 2023-01-01 RX ORDER — MORPHINE SULFATE 2 MG/ML
2 INJECTION, SOLUTION INTRAMUSCULAR; INTRAVENOUS EVERY 6 HOURS PRN
Status: DISCONTINUED | OUTPATIENT
Start: 2023-01-01 | End: 2023-01-01

## 2023-01-01 RX ORDER — TRAMADOL HYDROCHLORIDE 50 MG/1
50 TABLET ORAL ONCE
Status: COMPLETED | OUTPATIENT
Start: 2023-01-01 | End: 2023-01-01

## 2023-01-01 RX ORDER — BUSPIRONE HYDROCHLORIDE 5 MG/1
5 TABLET ORAL 3 TIMES DAILY
Status: DISCONTINUED | OUTPATIENT
Start: 2023-01-01 | End: 2023-01-01

## 2023-01-01 RX ORDER — PHENYLEPHRINE HCL IN 0.9% NACL 1 MG/10 ML
SYRINGE (ML) INTRAVENOUS
Status: COMPLETED
Start: 2023-01-01 | End: 2023-01-01

## 2023-01-01 RX ORDER — GABAPENTIN 300 MG/1
300 CAPSULE ORAL 3 TIMES DAILY
Status: DISCONTINUED | OUTPATIENT
Start: 2023-01-01 | End: 2023-01-01 | Stop reason: HOSPADM

## 2023-01-01 RX ORDER — PREDNISONE 20 MG/1
40 TABLET ORAL DAILY
Status: COMPLETED | OUTPATIENT
Start: 2023-01-01 | End: 2023-01-01

## 2023-01-01 RX ORDER — IPRATROPIUM BROMIDE AND ALBUTEROL SULFATE 2.5; .5 MG/3ML; MG/3ML
3 SOLUTION RESPIRATORY (INHALATION) EVERY 4 HOURS PRN
Status: DISCONTINUED | OUTPATIENT
Start: 2023-01-01 | End: 2023-01-01

## 2023-01-01 RX ORDER — HYDROCODONE BITARTRATE AND ACETAMINOPHEN 500; 5 MG/1; MG/1
TABLET ORAL
Status: DISCONTINUED | OUTPATIENT
Start: 2023-01-01 | End: 2023-01-01 | Stop reason: HOSPADM

## 2023-01-01 RX ORDER — GUAIFENESIN 600 MG/1
1200 TABLET, EXTENDED RELEASE ORAL 2 TIMES DAILY
Qty: 40 TABLET | Refills: 0 | Status: SHIPPED | OUTPATIENT
Start: 2023-01-01 | End: 2023-01-01

## 2023-01-01 RX ADMIN — HYDROCHLOROTHIAZIDE 12.5 MG: 12.5 TABLET ORAL at 08:07

## 2023-01-01 RX ADMIN — BUSPIRONE HYDROCHLORIDE 5 MG: 5 TABLET ORAL at 09:07

## 2023-01-01 RX ADMIN — TRAMADOL HYDROCHLORIDE 50 MG: 50 TABLET, COATED ORAL at 08:01

## 2023-01-01 RX ADMIN — NICOTINE 1 PATCH: 7 PATCH, EXTENDED RELEASE TRANSDERMAL at 08:01

## 2023-01-01 RX ADMIN — ALBUTEROL SULFATE 2.5 MG: 2.5 SOLUTION RESPIRATORY (INHALATION) at 07:07

## 2023-01-01 RX ADMIN — GABAPENTIN 300 MG: 300 CAPSULE ORAL at 09:03

## 2023-01-01 RX ADMIN — PREDNISONE 40 MG: 20 TABLET ORAL at 09:07

## 2023-01-01 RX ADMIN — METHYLPREDNISOLONE SODIUM SUCCINATE 40 MG: 40 INJECTION, POWDER, FOR SOLUTION INTRAMUSCULAR; INTRAVENOUS at 09:03

## 2023-01-01 RX ADMIN — IPRATROPIUM BROMIDE AND ALBUTEROL SULFATE 3 ML: 2.5; .5 SOLUTION RESPIRATORY (INHALATION) at 11:03

## 2023-01-01 RX ADMIN — FLUTICASONE FUROATE AND VILANTEROL TRIFENATATE 1 PUFF: 100; 25 POWDER RESPIRATORY (INHALATION) at 07:03

## 2023-01-01 RX ADMIN — CARVEDILOL 25 MG: 25 TABLET, FILM COATED ORAL at 07:01

## 2023-01-01 RX ADMIN — GUAIFENESIN 1200 MG: 600 TABLET, EXTENDED RELEASE ORAL at 08:01

## 2023-01-01 RX ADMIN — OXYMETAZOLINE HCL 2 SPRAY: 0.05 SPRAY NASAL at 06:07

## 2023-01-01 RX ADMIN — GABAPENTIN 300 MG: 300 CAPSULE ORAL at 08:03

## 2023-01-01 RX ADMIN — METHYLPREDNISOLONE SODIUM SUCCINATE 80 MG: 40 INJECTION, POWDER, FOR SOLUTION INTRAMUSCULAR; INTRAVENOUS at 11:03

## 2023-01-01 RX ADMIN — TRAMADOL HYDROCHLORIDE 100 MG: 50 TABLET, COATED ORAL at 02:07

## 2023-01-01 RX ADMIN — IPRATROPIUM BROMIDE AND ALBUTEROL SULFATE 3 ML: 2.5; .5 SOLUTION RESPIRATORY (INHALATION) at 07:03

## 2023-01-01 RX ADMIN — IPRATROPIUM BROMIDE AND ALBUTEROL SULFATE 3 ML: 2.5; .5 SOLUTION RESPIRATORY (INHALATION) at 03:03

## 2023-01-01 RX ADMIN — AMLODIPINE BESYLATE 10 MG: 10 TABLET ORAL at 09:03

## 2023-01-01 RX ADMIN — NASAL 2 SPRAY: 6.5 SPRAY NASAL at 12:08

## 2023-01-01 RX ADMIN — METHYLPREDNISOLONE SODIUM SUCCINATE 40 MG: 40 INJECTION, POWDER, FOR SOLUTION INTRAMUSCULAR; INTRAVENOUS at 01:03

## 2023-01-01 RX ADMIN — GABAPENTIN 300 MG: 300 CAPSULE ORAL at 02:01

## 2023-01-01 RX ADMIN — BUSPIRONE HYDROCHLORIDE 15 MG: 5 TABLET ORAL at 08:08

## 2023-01-01 RX ADMIN — IPRATROPIUM BROMIDE AND ALBUTEROL SULFATE 3 ML: 2.5; .5 SOLUTION RESPIRATORY (INHALATION) at 11:01

## 2023-01-01 RX ADMIN — GUAIFENESIN 600 MG: 600 TABLET, EXTENDED RELEASE ORAL at 08:03

## 2023-01-01 RX ADMIN — BUSPIRONE HYDROCHLORIDE 15 MG: 5 TABLET ORAL at 08:07

## 2023-01-01 RX ADMIN — METHYLPREDNISOLONE SODIUM SUCCINATE 125 MG: 125 INJECTION, POWDER, FOR SOLUTION INTRAMUSCULAR; INTRAVENOUS at 07:04

## 2023-01-01 RX ADMIN — LEVOFLOXACIN 750 MG: 750 INJECTION, SOLUTION INTRAVENOUS at 09:03

## 2023-01-01 RX ADMIN — METHYLPREDNISOLONE SODIUM SUCCINATE 80 MG: 40 INJECTION, POWDER, FOR SOLUTION INTRAMUSCULAR; INTRAVENOUS at 12:03

## 2023-01-01 RX ADMIN — HYDROCODONE BITARTRATE AND ACETAMINOPHEN 1 TABLET: 10; 325 TABLET ORAL at 01:07

## 2023-01-01 RX ADMIN — TRAMADOL HYDROCHLORIDE 100 MG: 50 TABLET, COATED ORAL at 04:07

## 2023-01-01 RX ADMIN — IPRATROPIUM BROMIDE AND ALBUTEROL SULFATE 3 ML: 2.5; .5 SOLUTION RESPIRATORY (INHALATION) at 03:01

## 2023-01-01 RX ADMIN — OXYMETAZOLINE HCL 2 SPRAY: 0.05 SPRAY NASAL at 09:07

## 2023-01-01 RX ADMIN — DULOXETINE HYDROCHLORIDE 20 MG: 20 CAPSULE, DELAYED RELEASE ORAL at 09:03

## 2023-01-01 RX ADMIN — TRAMADOL HYDROCHLORIDE 50 MG: 50 TABLET, COATED ORAL at 02:07

## 2023-01-01 RX ADMIN — FLUTICASONE FUROATE AND VILANTEROL TRIFENATATE 1 PUFF: 100; 25 POWDER RESPIRATORY (INHALATION) at 08:03

## 2023-01-01 RX ADMIN — IPRATROPIUM BROMIDE AND ALBUTEROL SULFATE 3 ML: 2.5; .5 SOLUTION RESPIRATORY (INHALATION) at 08:03

## 2023-01-01 RX ADMIN — IPRATROPIUM BROMIDE AND ALBUTEROL SULFATE 3 ML: 2.5; .5 SOLUTION RESPIRATORY (INHALATION) at 04:01

## 2023-01-01 RX ADMIN — ATORVASTATIN CALCIUM 40 MG: 40 TABLET, FILM COATED ORAL at 09:01

## 2023-01-01 RX ADMIN — GABAPENTIN 300 MG: 300 CAPSULE ORAL at 03:03

## 2023-01-01 RX ADMIN — CEFTRIAXONE 1 G: 1 INJECTION, POWDER, FOR SOLUTION INTRAMUSCULAR; INTRAVENOUS at 11:07

## 2023-01-01 RX ADMIN — LEVOFLOXACIN 750 MG: 750 INJECTION, SOLUTION INTRAVENOUS at 08:03

## 2023-01-01 RX ADMIN — NASAL 2 SPRAY: 6.5 SPRAY NASAL at 01:08

## 2023-01-01 RX ADMIN — OXYMETAZOLINE HCL 2 SPRAY: 0.05 SPRAY NASAL at 05:07

## 2023-01-01 RX ADMIN — ATORVASTATIN CALCIUM 40 MG: 40 TABLET, FILM COATED ORAL at 09:03

## 2023-01-01 RX ADMIN — CEFTRIAXONE 2 G: 2 INJECTION, POWDER, FOR SOLUTION INTRAMUSCULAR; INTRAVENOUS at 09:07

## 2023-01-01 RX ADMIN — TRAMADOL HYDROCHLORIDE 50 MG: 50 TABLET, COATED ORAL at 03:03

## 2023-01-01 RX ADMIN — CARVEDILOL 25 MG: 25 TABLET, FILM COATED ORAL at 05:03

## 2023-01-01 RX ADMIN — HYDROCODONE BITARTRATE AND ACETAMINOPHEN 1 TABLET: 10; 325 TABLET ORAL at 04:07

## 2023-01-01 RX ADMIN — METHYLPREDNISOLONE SODIUM SUCCINATE 125 MG: 125 INJECTION, POWDER, FOR SOLUTION INTRAMUSCULAR; INTRAVENOUS at 05:03

## 2023-01-01 RX ADMIN — TRAMADOL HYDROCHLORIDE 50 MG: 50 TABLET, COATED ORAL at 10:03

## 2023-01-01 RX ADMIN — TIOTROPIUM BROMIDE INHALATION SPRAY 2 PUFF: 3.12 SPRAY, METERED RESPIRATORY (INHALATION) at 09:07

## 2023-01-01 RX ADMIN — ALBUTEROL SULFATE 2.5 MG: 2.5 SOLUTION RESPIRATORY (INHALATION) at 08:07

## 2023-01-01 RX ADMIN — METHYLPREDNISOLONE SODIUM SUCCINATE 80 MG: 40 INJECTION, POWDER, FOR SOLUTION INTRAMUSCULAR; INTRAVENOUS at 06:03

## 2023-01-01 RX ADMIN — IPRATROPIUM BROMIDE AND ALBUTEROL SULFATE 3 ML: 2.5; .5 SOLUTION RESPIRATORY (INHALATION) at 12:03

## 2023-01-01 RX ADMIN — CARVEDILOL 25 MG: 25 TABLET, FILM COATED ORAL at 04:03

## 2023-01-01 RX ADMIN — Medication: at 08:08

## 2023-01-01 RX ADMIN — SODIUM CHLORIDE 2000 MG: 1 TABLET ORAL at 08:08

## 2023-01-01 RX ADMIN — GUAIFENESIN 1200 MG: 600 TABLET, EXTENDED RELEASE ORAL at 09:01

## 2023-01-01 RX ADMIN — HYDROCODONE BITARTRATE AND ACETAMINOPHEN 1 TABLET: 10; 325 TABLET ORAL at 05:07

## 2023-01-01 RX ADMIN — HYDROCHLOROTHIAZIDE 12.5 MG: 12.5 TABLET ORAL at 09:03

## 2023-01-01 RX ADMIN — ALBUTEROL SULFATE 2.5 MG: 2.5 SOLUTION RESPIRATORY (INHALATION) at 02:07

## 2023-01-01 RX ADMIN — WHITE PETROLATUM: 1.75 OINTMENT TOPICAL at 09:07

## 2023-01-01 RX ADMIN — GUAIFENESIN 600 MG: 600 TABLET, EXTENDED RELEASE ORAL at 09:03

## 2023-01-01 RX ADMIN — FLUTICASONE FUROATE AND VILANTEROL TRIFENATATE 1 PUFF: 100; 25 POWDER RESPIRATORY (INHALATION) at 08:07

## 2023-01-01 RX ADMIN — MORPHINE SULFATE 6 MG: 2 INJECTION, SOLUTION INTRAMUSCULAR; INTRAVENOUS at 08:09

## 2023-01-01 RX ADMIN — CARVEDILOL 25 MG: 25 TABLET, FILM COATED ORAL at 09:03

## 2023-01-01 RX ADMIN — METHYLPREDNISOLONE SODIUM SUCCINATE 40 MG: 40 INJECTION, POWDER, FOR SOLUTION INTRAMUSCULAR; INTRAVENOUS at 02:01

## 2023-01-01 RX ADMIN — CARVEDILOL 25 MG: 25 TABLET, FILM COATED ORAL at 10:07

## 2023-01-01 RX ADMIN — HYDROCHLOROTHIAZIDE 12.5 MG: 12.5 TABLET ORAL at 09:01

## 2023-01-01 RX ADMIN — NICOTINE 1 PATCH: 21 PATCH, EXTENDED RELEASE TRANSDERMAL at 09:03

## 2023-01-01 RX ADMIN — HYDROCHLOROTHIAZIDE 12.5 MG: 12.5 TABLET ORAL at 08:03

## 2023-01-01 RX ADMIN — TRAMADOL HYDROCHLORIDE 100 MG: 50 TABLET, COATED ORAL at 10:08

## 2023-01-01 RX ADMIN — CARVEDILOL 25 MG: 25 TABLET, FILM COATED ORAL at 07:03

## 2023-01-01 RX ADMIN — AMLODIPINE BESYLATE 10 MG: 10 TABLET ORAL at 09:01

## 2023-01-01 RX ADMIN — TRAMADOL HYDROCHLORIDE 100 MG: 50 TABLET, COATED ORAL at 12:07

## 2023-01-01 RX ADMIN — CEFTRIAXONE 2 G: 2 INJECTION, POWDER, FOR SOLUTION INTRAMUSCULAR; INTRAVENOUS at 08:07

## 2023-01-01 RX ADMIN — MORPHINE SULFATE 2 MG: 2 INJECTION, SOLUTION INTRAMUSCULAR; INTRAVENOUS at 02:07

## 2023-01-01 RX ADMIN — TRAMADOL HYDROCHLORIDE 100 MG: 50 TABLET, COATED ORAL at 09:07

## 2023-01-01 RX ADMIN — POTASSIUM PHOSPHATE, MONOBASIC AND POTASSIUM PHOSPHATE, DIBASIC 30 MMOL: 224; 236 INJECTION, SOLUTION, CONCENTRATE INTRAVENOUS at 10:07

## 2023-01-01 RX ADMIN — BUSPIRONE HYDROCHLORIDE 5 MG: 5 TABLET ORAL at 02:07

## 2023-01-01 RX ADMIN — HYDROCODONE BITARTRATE AND ACETAMINOPHEN 1 TABLET: 10; 325 TABLET ORAL at 09:07

## 2023-01-01 RX ADMIN — TRAMADOL HYDROCHLORIDE 50 MG: 50 TABLET, COATED ORAL at 08:03

## 2023-01-01 RX ADMIN — MAGNESIUM SULFATE HEPTAHYDRATE 2 G: 40 INJECTION, SOLUTION INTRAVENOUS at 07:04

## 2023-01-01 RX ADMIN — SODIUM CHLORIDE 2000 MG: 1 TABLET ORAL at 01:08

## 2023-01-01 RX ADMIN — Medication: at 03:08

## 2023-01-01 RX ADMIN — POLYETHYLENE GLYCOL 3350 17 G: 17 POWDER, FOR SOLUTION ORAL at 08:07

## 2023-01-01 RX ADMIN — PANTOPRAZOLE SODIUM 40 MG: 40 TABLET, DELAYED RELEASE ORAL at 09:03

## 2023-01-01 RX ADMIN — MAGNESIUM SULFATE HEPTAHYDRATE 2 G: 40 INJECTION, SOLUTION INTRAVENOUS at 08:03

## 2023-01-01 RX ADMIN — BUSPIRONE HYDROCHLORIDE 15 MG: 5 TABLET ORAL at 09:08

## 2023-01-01 RX ADMIN — DULOXETINE HYDROCHLORIDE 20 MG: 20 CAPSULE, DELAYED RELEASE ORAL at 09:01

## 2023-01-01 RX ADMIN — SODIUM CHLORIDE: 9 INJECTION, SOLUTION INTRAVENOUS at 11:07

## 2023-01-01 RX ADMIN — OXYMETAZOLINE HCL 2 SPRAY: 0.05 SPRAY NASAL at 02:07

## 2023-01-01 RX ADMIN — LEVALBUTEROL 1.25 MG: 1.25 SOLUTION, CONCENTRATE RESPIRATORY (INHALATION) at 02:07

## 2023-01-01 RX ADMIN — METHYLPREDNISOLONE SODIUM SUCCINATE 40 MG: 40 INJECTION, POWDER, FOR SOLUTION INTRAMUSCULAR; INTRAVENOUS at 06:01

## 2023-01-01 RX ADMIN — METHYLPREDNISOLONE SODIUM SUCCINATE 40 MG: 40 INJECTION, POWDER, FOR SOLUTION INTRAMUSCULAR; INTRAVENOUS at 05:03

## 2023-01-01 RX ADMIN — ASPIRIN 81 MG 81 MG: 81 TABLET ORAL at 09:03

## 2023-01-01 RX ADMIN — WHITE PETROLATUM: 1.75 OINTMENT TOPICAL at 08:08

## 2023-01-01 RX ADMIN — TRAMADOL HYDROCHLORIDE 100 MG: 50 TABLET, COATED ORAL at 08:07

## 2023-01-01 RX ADMIN — ALBUTEROL SULFATE 2.5 MG: 2.5 SOLUTION RESPIRATORY (INHALATION) at 06:07

## 2023-01-01 RX ADMIN — AMLODIPINE BESYLATE 10 MG: 10 TABLET ORAL at 08:03

## 2023-01-01 RX ADMIN — TRAMADOL HYDROCHLORIDE 100 MG: 50 TABLET, COATED ORAL at 06:07

## 2023-01-01 RX ADMIN — AZITHROMYCIN MONOHYDRATE 500 MG: 250 TABLET ORAL at 09:07

## 2023-01-01 RX ADMIN — HYDROCODONE BITARTRATE AND ACETAMINOPHEN 1 TABLET: 10; 325 TABLET ORAL at 07:07

## 2023-01-01 RX ADMIN — IPRATROPIUM BROMIDE AND ALBUTEROL SULFATE 3 ML: 2.5; .5 SOLUTION RESPIRATORY (INHALATION) at 09:03

## 2023-01-01 RX ADMIN — ALBUTEROL SULFATE 2.5 MG: 2.5 SOLUTION RESPIRATORY (INHALATION) at 09:07

## 2023-01-01 RX ADMIN — NASAL 2 SPRAY: 6.5 SPRAY NASAL at 01:07

## 2023-01-01 RX ADMIN — NICOTINE 1 PATCH: 21 PATCH, EXTENDED RELEASE TRANSDERMAL at 08:03

## 2023-01-01 RX ADMIN — TRAMADOL HYDROCHLORIDE 100 MG: 50 TABLET, COATED ORAL at 05:07

## 2023-01-01 RX ADMIN — IPRATROPIUM BROMIDE AND ALBUTEROL SULFATE 3 ML: 2.5; .5 SOLUTION RESPIRATORY (INHALATION) at 07:01

## 2023-01-01 RX ADMIN — METHYLPREDNISOLONE SODIUM SUCCINATE 40 MG: 40 INJECTION, POWDER, FOR SOLUTION INTRAMUSCULAR; INTRAVENOUS at 05:01

## 2023-01-01 RX ADMIN — TRAMADOL HYDROCHLORIDE 100 MG: 50 TABLET, COATED ORAL at 04:08

## 2023-01-01 RX ADMIN — IPRATROPIUM BROMIDE AND ALBUTEROL SULFATE 3 ML: 2.5; .5 SOLUTION RESPIRATORY (INHALATION) at 08:04

## 2023-01-01 RX ADMIN — NASAL 2 SPRAY: 6.5 SPRAY NASAL at 05:07

## 2023-01-01 RX ADMIN — NASAL 2 SPRAY: 6.5 SPRAY NASAL at 09:07

## 2023-01-01 RX ADMIN — CARVEDILOL 25 MG: 25 TABLET, FILM COATED ORAL at 09:07

## 2023-01-01 RX ADMIN — HYDROCODONE BITARTRATE AND ACETAMINOPHEN 1 TABLET: 10; 325 TABLET ORAL at 01:08

## 2023-01-01 RX ADMIN — TRAMADOL HYDROCHLORIDE 50 MG: 50 TABLET, COATED ORAL at 07:03

## 2023-01-01 RX ADMIN — NASAL 2 SPRAY: 6.5 SPRAY NASAL at 08:07

## 2023-01-01 RX ADMIN — NASAL 2 SPRAY: 6.5 SPRAY NASAL at 09:08

## 2023-01-01 RX ADMIN — TRAMADOL HYDROCHLORIDE 50 MG: 50 TABLET, COATED ORAL at 04:03

## 2023-01-01 RX ADMIN — ONDANSETRON 4 MG: 4 TABLET, ORALLY DISINTEGRATING ORAL at 08:09

## 2023-01-01 RX ADMIN — CARVEDILOL 25 MG: 25 TABLET, FILM COATED ORAL at 08:01

## 2023-01-01 RX ADMIN — HYDROCODONE BITARTRATE AND ACETAMINOPHEN 1 TABLET: 10; 325 TABLET ORAL at 08:08

## 2023-01-01 RX ADMIN — NASAL 2 SPRAY: 6.5 SPRAY NASAL at 02:07

## 2023-01-01 RX ADMIN — METHYLPREDNISOLONE SODIUM SUCCINATE 80 MG: 40 INJECTION, POWDER, FOR SOLUTION INTRAMUSCULAR; INTRAVENOUS at 05:03

## 2023-01-01 RX ADMIN — CEFTRIAXONE 2 G: 2 INJECTION, POWDER, FOR SOLUTION INTRAMUSCULAR; INTRAVENOUS at 10:07

## 2023-01-01 RX ADMIN — TRAMADOL HYDROCHLORIDE 100 MG: 50 TABLET, COATED ORAL at 10:07

## 2023-01-01 RX ADMIN — HYDROCHLOROTHIAZIDE 12.5 MG: 12.5 TABLET ORAL at 09:07

## 2023-01-01 RX ADMIN — SODIUM CHLORIDE: 9 INJECTION, SOLUTION INTRAVENOUS at 05:08

## 2023-01-01 RX ADMIN — BUSPIRONE HYDROCHLORIDE 15 MG: 5 TABLET ORAL at 04:07

## 2023-01-01 RX ADMIN — TRAMADOL HYDROCHLORIDE 50 MG: 50 TABLET, COATED ORAL at 10:01

## 2023-01-01 RX ADMIN — TIOTROPIUM BROMIDE INHALATION SPRAY 2 PUFF: 3.12 SPRAY, METERED RESPIRATORY (INHALATION) at 08:07

## 2023-01-01 RX ADMIN — BUSPIRONE HYDROCHLORIDE 15 MG: 5 TABLET ORAL at 02:07

## 2023-01-01 RX ADMIN — TIOTROPIUM BROMIDE INHALATION SPRAY 2 PUFF: 3.12 SPRAY, METERED RESPIRATORY (INHALATION) at 07:07

## 2023-01-01 RX ADMIN — TRAMADOL HYDROCHLORIDE 50 MG: 50 TABLET, COATED ORAL at 09:03

## 2023-01-01 RX ADMIN — HYDROMORPHONE HYDROCHLORIDE 1 MG: 1 INJECTION, SOLUTION INTRAMUSCULAR; INTRAVENOUS; SUBCUTANEOUS at 08:09

## 2023-01-01 RX ADMIN — Medication: at 09:08

## 2023-01-01 RX ADMIN — IPRATROPIUM BROMIDE AND ALBUTEROL SULFATE 3 ML: 2.5; .5 SOLUTION RESPIRATORY (INHALATION) at 12:01

## 2023-01-01 RX ADMIN — Medication: at 08:07

## 2023-01-01 RX ADMIN — PREDNISONE 40 MG: 20 TABLET ORAL at 06:08

## 2023-01-01 RX ADMIN — ACETYLCYSTEINE 2 ML: 200 INHALANT RESPIRATORY (INHALATION) at 11:01

## 2023-01-01 RX ADMIN — NASAL 2 SPRAY: 6.5 SPRAY NASAL at 04:08

## 2023-01-01 RX ADMIN — ALBUTEROL SULFATE 2.5 MG: 2.5 SOLUTION RESPIRATORY (INHALATION) at 11:07

## 2023-01-01 RX ADMIN — BUSPIRONE HYDROCHLORIDE 5 MG: 5 TABLET ORAL at 03:07

## 2023-01-01 RX ADMIN — TRAMADOL HYDROCHLORIDE 50 MG: 50 TABLET, COATED ORAL at 12:07

## 2023-01-01 RX ADMIN — Medication 6 MG: at 08:08

## 2023-01-01 RX ADMIN — ASPIRIN 81 MG 81 MG: 81 TABLET ORAL at 08:03

## 2023-01-01 RX ADMIN — OXYMETAZOLINE HCL 2 SPRAY: 0.05 SPRAY NASAL at 10:07

## 2023-01-01 RX ADMIN — METHYLPREDNISOLONE SODIUM SUCCINATE 40 MG: 40 INJECTION, POWDER, FOR SOLUTION INTRAMUSCULAR; INTRAVENOUS at 09:01

## 2023-01-01 RX ADMIN — CARVEDILOL 25 MG: 25 TABLET, FILM COATED ORAL at 05:01

## 2023-01-01 RX ADMIN — Medication: at 09:07

## 2023-01-01 RX ADMIN — ASPIRIN 81 MG: 81 TABLET, CHEWABLE ORAL at 09:01

## 2023-01-01 RX ADMIN — Medication: at 02:07

## 2023-01-01 RX ADMIN — NASAL 2 SPRAY: 6.5 SPRAY NASAL at 08:08

## 2023-01-01 RX ADMIN — NASAL 2 SPRAY: 6.5 SPRAY NASAL at 04:07

## 2023-01-01 RX ADMIN — SODIUM CHLORIDE: 9 INJECTION, SOLUTION INTRAVENOUS at 08:07

## 2023-01-01 RX ADMIN — CARVEDILOL 25 MG: 25 TABLET, FILM COATED ORAL at 08:07

## 2023-01-01 RX ADMIN — ASPIRIN 81 MG: 81 TABLET, CHEWABLE ORAL at 08:01

## 2023-01-01 RX ADMIN — SODIUM CHLORIDE: 9 INJECTION, SOLUTION INTRAVENOUS at 04:07

## 2023-01-01 RX ADMIN — HYDROCODONE BITARTRATE AND ACETAMINOPHEN 1 TABLET: 10; 325 TABLET ORAL at 11:07

## 2023-01-01 RX ADMIN — TRAMADOL HYDROCHLORIDE 50 MG: 50 TABLET, COATED ORAL at 09:07

## 2023-01-01 RX ADMIN — HYDROCHLOROTHIAZIDE 12.5 MG: 12.5 TABLET ORAL at 08:01

## 2023-01-01 RX ADMIN — ALBUTEROL SULFATE 2.5 MG: 2.5 SOLUTION RESPIRATORY (INHALATION) at 03:07

## 2023-01-01 RX ADMIN — LORAZEPAM 0.5 MG: 0.5 TABLET ORAL at 09:01

## 2023-01-01 RX ADMIN — DULOXETINE HYDROCHLORIDE 20 MG: 20 CAPSULE, DELAYED RELEASE ORAL at 08:01

## 2023-01-01 RX ADMIN — METHYLPREDNISOLONE SODIUM SUCCINATE 80 MG: 40 INJECTION, POWDER, FOR SOLUTION INTRAMUSCULAR; INTRAVENOUS at 01:03

## 2023-01-01 RX ADMIN — LISINOPRIL 40 MG: 20 TABLET ORAL at 08:01

## 2023-01-01 RX ADMIN — IPRATROPIUM BROMIDE AND ALBUTEROL SULFATE 3 ML: 2.5; .5 SOLUTION RESPIRATORY (INHALATION) at 04:03

## 2023-01-01 RX ADMIN — BUSPIRONE HYDROCHLORIDE 15 MG: 5 TABLET ORAL at 09:07

## 2023-01-01 RX ADMIN — FLUTICASONE FUROATE AND VILANTEROL TRIFENATATE 1 PUFF: 100; 25 POWDER RESPIRATORY (INHALATION) at 10:08

## 2023-01-01 RX ADMIN — METHYLPREDNISOLONE SODIUM SUCCINATE 125 MG: 125 INJECTION, POWDER, FOR SOLUTION INTRAMUSCULAR; INTRAVENOUS at 01:03

## 2023-01-01 RX ADMIN — AMLODIPINE BESYLATE 10 MG: 10 TABLET ORAL at 08:01

## 2023-01-01 RX ADMIN — OXYMETAZOLINE HYDROCHLORIDE 2 SPRAY: 0.05 SPRAY NASAL at 04:07

## 2023-01-01 RX ADMIN — TIOTROPIUM BROMIDE INHALATION SPRAY 2 PUFF: 3.12 SPRAY, METERED RESPIRATORY (INHALATION) at 09:08

## 2023-01-01 RX ADMIN — ALBUTEROL SULFATE 2.5 MG: 2.5 SOLUTION RESPIRATORY (INHALATION) at 01:07

## 2023-01-01 RX ADMIN — PREDNISONE 40 MG: 20 TABLET ORAL at 08:08

## 2023-01-01 RX ADMIN — HYDROCODONE BITARTRATE AND ACETAMINOPHEN 1 TABLET: 10; 325 TABLET ORAL at 09:08

## 2023-01-01 RX ADMIN — GABAPENTIN 300 MG: 300 CAPSULE ORAL at 09:01

## 2023-01-01 RX ADMIN — NICOTINE 1 PATCH: 7 PATCH, EXTENDED RELEASE TRANSDERMAL at 09:01

## 2023-01-01 RX ADMIN — Medication: at 03:07

## 2023-01-01 RX ADMIN — LISINOPRIL 40 MG: 20 TABLET ORAL at 09:01

## 2023-01-01 RX ADMIN — FLUTICASONE FUROATE AND VILANTEROL TRIFENATATE 1 PUFF: 100; 25 POWDER RESPIRATORY (INHALATION) at 07:07

## 2023-01-01 RX ADMIN — ATORVASTATIN CALCIUM 40 MG: 40 TABLET, FILM COATED ORAL at 08:03

## 2023-01-01 RX ADMIN — FLUTICASONE FUROATE AND VILANTEROL TRIFENATATE 1 PUFF: 100; 25 POWDER RESPIRATORY (INHALATION) at 09:07

## 2023-01-01 RX ADMIN — TRAMADOL HYDROCHLORIDE 50 MG: 50 TABLET, COATED ORAL at 10:07

## 2023-01-01 RX ADMIN — CEFTRIAXONE SODIUM 1 G: 1 INJECTION, POWDER, FOR SOLUTION INTRAMUSCULAR; INTRAVENOUS at 09:04

## 2023-01-01 RX ADMIN — DULOXETINE HYDROCHLORIDE 20 MG: 20 CAPSULE, DELAYED RELEASE ORAL at 08:03

## 2023-01-01 RX ADMIN — BUSPIRONE HYDROCHLORIDE 5 MG: 5 TABLET ORAL at 08:07

## 2023-01-01 RX ADMIN — FLUTICASONE FUROATE AND VILANTEROL TRIFENATATE 1 PUFF: 100; 25 POWDER RESPIRATORY (INHALATION) at 09:08

## 2023-01-01 RX ADMIN — AZITHROMYCIN MONOHYDRATE 500 MG: 500 INJECTION, POWDER, LYOPHILIZED, FOR SOLUTION INTRAVENOUS at 09:07

## 2023-01-01 RX ADMIN — CEFTRIAXONE 2 G: 2 INJECTION, POWDER, FOR SOLUTION INTRAMUSCULAR; INTRAVENOUS at 11:07

## 2023-01-01 RX ADMIN — BUSPIRONE HYDROCHLORIDE 15 MG: 5 TABLET ORAL at 03:08

## 2023-01-01 RX ADMIN — METHYLPREDNISOLONE SODIUM SUCCINATE 125 MG: 125 INJECTION, POWDER, FOR SOLUTION INTRAMUSCULAR; INTRAVENOUS at 08:03

## 2023-01-01 RX ADMIN — GABAPENTIN 300 MG: 300 CAPSULE ORAL at 08:01

## 2023-01-01 RX ADMIN — NASAL 2 SPRAY: 6.5 SPRAY NASAL at 06:07

## 2023-01-01 RX ADMIN — METHYLPREDNISOLONE SODIUM SUCCINATE 40 MG: 40 INJECTION, POWDER, FOR SOLUTION INTRAMUSCULAR; INTRAVENOUS at 03:03

## 2023-01-01 RX ADMIN — SENNOSIDES AND DOCUSATE SODIUM 1 TABLET: 50; 8.6 TABLET ORAL at 02:07

## 2023-01-01 RX ADMIN — ATORVASTATIN CALCIUM 40 MG: 40 TABLET, FILM COATED ORAL at 08:01

## 2023-01-01 RX ADMIN — TIOTROPIUM BROMIDE INHALATION SPRAY 2 PUFF: 3.12 SPRAY, METERED RESPIRATORY (INHALATION) at 08:08

## 2023-01-01 NOTE — ASSESSMENT & PLAN NOTE
Steroids and nebs  F/u pulm outpt as scheduled for bronch 1/9/2023 12/31  Tolerated mucormyst very well. Repeat dose today.  Can taper steroid.    1/1  End stage. Awaiting bronch on 1/9. Check 6 min walk. Can d/c home with continued prednisone taper, mucinex, breathing treatments and anoro.

## 2023-01-01 NOTE — RESPIRATORY THERAPY
Home Oxygen Evaluation    Date Performed: 2023    1) Patient's O2 Sat on room air, while at rest: 92%         If O2 sats on room air at rest are 88% or below, patient qualifies. No additional testing needed. Document N/A in steps 2 and 3. If 89% or above, complete steps 2.      2) Patient's O2 Sat on room air while exercisin%        If O2 sats on room air while exercising remain 89% or above patient does not qualify, no further testing needed Document N/A in step 3. If O2 sats on room air while exercising are 88% or below, continue to step 3.      3) Patient's O2 Sat while exercising on O2: 92% at 3 LPM         (Must show improvement from #2 for patients to qualify)    If O2 sats improve on oxygen, patient qualifies for portable oxygen. If not, the patient does not qualify.

## 2023-01-01 NOTE — DISCHARGE SUMMARY
[facility] ([facility])  [facility] Medicine  Discharge Summary      Patient Name: Brando Culp Jr.  MRN: [id number]  RENETTA: [id number]  Patient Class: IP- Inpatient  Admission Date: 12/27/2022  Hospital Length of Stay: 4 days  Discharge Date and Time:  01/01/2023 11:36 AM  Attending Physician: Tobi Bahena MD   Discharging Provider: Rocio Rider MD  Primary Care Provider: Dioni Matson MD    Primary Care Team: Networked reference to record PCT     HPI:   61yo male patient with hx of arthritis COPD and HTN presented to ER with c/o SOB. Started last Thursday and has progressed to unable to take more than a few steps due to BANGURA. Pt has end stage COPD and was seen last week in ER and sent home on ABX. Underlying lung lesions scheduled for bronchoscopy int he beginning of next month. Quantiferon gold 9/2022 was negative. He was given 3 stacked nebs as well as medrol 125mg in ER. No fever. Sat % on RA. WBC 08064 (down from 70564 last week). CXR with chronic changes. No acute infiltrates. PCO2 41. PO2 72 and bicarb 29 on RA. Pt was placed in observation for COPD exacerbation for nebs and IV steroids. He reports that he still feels SOB- feels like he hits a brick wall when tryiong to taskr a deep breath in. Denies fever at home but + sweats and chills. Started about a month ago when he had the flu. He is feeling a little better this am from admission     12/2/22>>> ct chest    Progressive central cavitation of the thick wall lesion in the left lower lobe since the prior examination.   Calcified nodules and bronchiectasis in the left upper lobe.   Irregular areas of consolidation in the right middle lobe and left upper lobe which have increased in prominence compared to the prior study.  A PET-CT scan could be obtained for further assessment to evaluate the significance of these findings.   Emphysematous changes in the lungs bilaterally.      * No surgery found *      Hospital Course:   12/29 pt here for  COPD exacerbation. Today is day 2 of nebs every 4hr and medrol 125mg IV every 6hr. He remains on 1L NC but just for comfort. POX 95%. Still REED but SOB is better. VSS/afebrile. Seemed anxious yesterday/depressed. Started Cymbalta as well as ativan at night to help with sleep. Slept well.     12/30 pt here for end stage COPD exacerbation- still tight and wheezing yesterday continued 125mg medrol q 6 hr but started 80mg overnight. He does report improved SOB/BANGURA- shorter recovery when getting up to bathroom and coming back. Remains on 1L NC for comfort. Sats 92-96%- does not have home O2. Not as tight but still tight. Coughing but not bringing up much. Feels congested. Ativan at night helping with sleep and tolerating the cymbalta well.     12/31  Much improved breathing this am. He is feeling less dyspneic. He had a bm yesterday. Still some tightness, but cough improved. Afebrile. Tolerated mucormyst well yesterday but bipap gave him a headache.    1/1  Wheezing much improved. He feels much better but is still very dyspneic with movement. Not requiring o2, but will check 6 min walk this am. Has done well with mucormyst/acapella/mucinex. Afebrile. BP elevated yesterday - starting hctz today. Notes he feels like cymbalta is helping.       Goals of Care Treatment Preferences:  Code Status: Full Code      Consults:     * COPD exacerbation  duonebs every 4hr  Medrol 125mg IV every 6hr> weaned last night to 80mg IV every 8hr  RA sats %> but was placed on 1L NC for comfort which he says helps him feel not as SOB    Still with trouble speaking in sentences. He does report shorter recovery time with BANGURA, still feels tight  Will try bipap today if he can tolerate it  mucormyst x 1.     12/31  Much improved resp exam. Still dyspneic with exertion.  No o2 requirement. Getting a lot of steroid at this point, so will wean and anticipate possible d/c home tomorrow.    Anxiety and depression  start low dose cymbalta for mood  as well as chronic neuropathy pain/numbness in legs   add ativan very low dose for sleep as needed (he is very anxious as well)  Tolerating cymbalta well and ativan is helping him rest well     1/1  Cont on cymbalta and titrate outpatient.    Bilateral low back pain with sciatica  Cont home gabapentin       Stage 4 very severe COPD by GOLD classification  Steroids and nebs  F/u pulm outpt as scheduled for bronch 1/9/2023 12/31  Tolerated mucormyst very well. Repeat dose today.  Can taper steroid.    1/1  End stage. Awaiting bronch on 1/9. Check 6 min walk. Can d/c home with continued prednisone taper, mucinex, breathing treatments and anoro.    Essential hypertension  Cont home meds lisinopril, coreg and amlodipine   /79    136//81  Likely being pushed up by his steroids.    Will not adjust meds.    Bp remains slightly elevated, again likely steroid driven. However, will give a small dose of hctz this am.  Denies any issues or pain.    1/1  Cont home meds. Discussed addition of hctz but may be able to d/c this as his steroids taper.      Final Active Diagnoses:    Diagnosis Date Noted POA    PRINCIPAL PROBLEM:  COPD exacerbation [J44.1] 11/08/2022 Yes    Anxiety and depression [F41.9, F32.A] 12/28/2022 Yes    Bilateral low back pain with sciatica [M54.40] 04/08/2016 Yes    Essential hypertension [I10] 12/30/2015 Yes    Stage 4 very severe COPD by GOLD classification [J44.9] 12/30/2015 Yes      Problems Resolved During this Admission:       Discharged Condition: fair    Disposition: Home or Self Care    Follow Up:   Follow-up Information     Dioni Matson MD .    Specialty: Internal Medicine  Contact information:  [address]  [place] [id number]  [phone]                       Patient Instructions:   No discharge procedures on file.    Significant Diagnostic Studies: Labs: BMP: No results for input(s): GLU, NA, K, CL, CO2, BUN, CREATININE, CALCIUM, MG in the last 48 hours. and CBC No  results for input(s): WBC, HGB, HCT, PLT in the last 48 hours.    Pending Diagnostic Studies:     None         Medications:  Reconciled Home Medications:      Medication List      START taking these medications    DULoxetine 20 MG capsule  Commonly known as: CYMBALTA  Take 1 capsule (20 mg total) by mouth once daily.  Start taking on: January 2, 2023     guaiFENesin 600 mg 12 hr tablet  Commonly known as: MUCINEX  Take 2 tablets (1,200 mg total) by mouth 2 (two) times daily. for 10 days     hydroCHLOROthiazide 12.5 MG Tab  Commonly known as: HYDRODIURIL  Take 1 tablet (12.5 mg total) by mouth once daily.  Start taking on: January 2, 2023     montelukast 10 mg tablet  Commonly known as: SINGULAIR  Take 1 tablet (10 mg total) by mouth every evening.     nicotine 7 mg/24 hr  Commonly known as: NICODERM CQ  Place 1 patch onto the skin once daily.  Start taking on: January 2, 2023     predniSONE 20 MG tablet  Commonly known as: DELTASONE  Take 2 tablets (40 mg total) by mouth 2 (two) times daily for 3 days, THEN 1 tablet (20 mg total) once daily for 3 days, THEN 1 tablet (20 mg total) once daily for 3 days.  Start taking on: December 31, 2022        CHANGE how you take these medications    * PROVENTIL HFA 90 mcg/actuation inhaler  Generic drug: albuterol  Inhale 2 puffs into the lungs every 6 (six) hours as needed for Wheezing.  What changed: Another medication with the same name was added. Make sure you understand how and when to take each.     * albuterol 90 mcg/actuation inhaler  Commonly known as: PROVENTIL/VENTOLIN HFA  Inhale 1-2 puffs into the lungs every 6 (six) hours as needed for Wheezing. Rescue  What changed: You were already taking a medication with the same name, and this prescription was added. Make sure you understand how and when to take each.         * This list has 2 medication(s) that are the same as other medications prescribed for you. Read the directions carefully, and ask your doctor or other care  provider to review them with you.            CONTINUE taking these medications    albuterol-ipratropium 2.5 mg-0.5 mg/3 mL nebulizer solution  Commonly known as: DUO-NEB  Take 3 mLs by nebulization every 6 (six) hours as needed for Wheezing. Rescue     amLODIPine 10 MG tablet  Commonly known as: NORVASC  Take 1 tablet (10 mg total) by mouth once daily.     ANORO ELLIPTA 62.5-25 mcg/actuation Dsdv  Generic drug: umeclidinium-vilanteroL  Inhale 1 puff into the lungs once daily. Controller     aspirin 81 MG Chew  Take 1 tablet (81 mg total) by mouth once daily.     carvediloL 25 MG tablet  Commonly known as: COREG  Take 1 tablet (25 mg total) by mouth 2 (two) times daily with meals.     gabapentin 300 MG capsule  Commonly known as: NEURONTIN  Take 1 capsule (300 mg total) by mouth 3 (three) times daily.     LIPITOR 40 MG tablet  Generic drug: atorvastatin  Take 1 tablet (40 mg total) by mouth once daily.     lisinopriL 20 MG tablet  Commonly known as: PRINIVIL,ZESTRIL  Take 2 tablets (40 mg total) by mouth once daily.     traMADoL 50 mg tablet  Commonly known as: ULTRAM  Take 1 tablet (50 mg total) by mouth every 8 (eight) hours as needed.        STOP taking these medications    amoxicillin-clavulanate 875-125mg 875-125 mg per tablet  Commonly known as: AUGMENTIN     azithromycin 250 MG tablet  Commonly known as: Z-DEEPA     sulfamethoxazole-trimethoprim 800-160mg 800-160 mg Tab  Commonly known as: BACTRIM DS            Indwelling Lines/Drains at time of discharge:   Lines/Drains/Airways     None                 Time spent on the discharge of patient: 35 minutes         Rocio Rider MD  Department of Hospital Medicine  [facility] (3rd Fl)

## 2023-01-01 NOTE — RESPIRATORY THERAPY
Home Oxygen Evaluation    Date Performed: 2023    1) Patient's Home O2 Sat on room air, while at rest: 94%        If O2 sats on room air at rest are 88% or below, patient qualifies. No additional testing needed. Document N/A in steps 2 and 3. If 89% or above, complete steps 2.      2) Patient's O2 Sat on room air while exercisin        If O2 sats on room air while exercising remain 89% or above patient does not qualify, no further testing needed Document N/A in step 3. If O2 sats on room air while exercising are 88% or below, continue to step 3.      3) Patient's O2 Sat while exercising on O2:  at  LPM         (Must show improvement from #2 for patients to qualify)    If O2 sats improve on oxygen, patient qualifies for portable oxygen. If not, the patient does not qualify.

## 2023-01-01 NOTE — ASSESSMENT & PLAN NOTE
Cont home meds lisinopril, coreg and amlodipine   /79    136//81  Likely being pushed up by his steroids.    Will not adjust meds.    Bp remains slightly elevated, again likely steroid driven. However, will give a small dose of hctz this am.  Denies any issues or pain.    1/1  Cont home meds. Discussed addition of hctz but may be able to d/c this as his steroids taper.

## 2023-01-01 NOTE — SUBJECTIVE & OBJECTIVE
Review of Systems   Constitutional:  Positive for unexpected weight change (15# in last month). Negative for activity change, appetite change, chills, diaphoresis and fever.   HENT:  Negative for congestion, sinus pressure and sore throat.    Respiratory:  Positive for cough, chest tightness and shortness of breath. Negative for wheezing.    Cardiovascular:  Negative for palpitations and leg swelling.        Chest tightness   Gastrointestinal:  Negative for abdominal pain, diarrhea, nausea and vomiting.   Genitourinary:  Negative for dysuria, frequency and urgency.   Musculoskeletal:  Positive for arthralgias. Negative for myalgias.        Legs feel numb and tingling    Skin:  Negative for rash and wound.   Neurological:  Negative for dizziness, syncope, light-headedness and headaches (better with suppleemntal O2).   Psychiatric/Behavioral:  Negative for dysphoric mood and sleep disturbance. The patient is not nervous/anxious.    Objective:     Vital Signs (Most Recent):  Temp: 97 °F (36.1 °C) (01/01/23 1120)  Pulse: 67 (01/01/23 1120)  Resp: 14 (01/01/23 1120)  BP: 134/80 (01/01/23 1120)  SpO2: 97 % (01/01/23 1120)   Vital Signs (24h Range):  Temp:  [97 °F (36.1 °C)-98.4 °F (36.9 °C)] 97 °F (36.1 °C)  Pulse:  [61-83] 67  Resp:  [14-19] 14  SpO2:  [91 %-98 %] 97 %  BP: (119-169)/(68-94) 134/80     Weight: 68 kg (149 lb 14.6 oz)  Body mass index is 21.51 kg/m².    Physical Exam  Constitutional:       Appearance: Normal appearance. He is well-developed. He is not ill-appearing.      Comments: thin   HENT:      Head: Normocephalic and atraumatic.      Right Ear: External ear normal.      Left Ear: External ear normal.      Nose: Nose normal.      Mouth/Throat:      Mouth: Mucous membranes are moist.      Pharynx: No oropharyngeal exudate or posterior oropharyngeal erythema.   Eyes:      General: No scleral icterus.        Right eye: No discharge.         Left eye: No discharge.      Conjunctiva/sclera: Conjunctivae  normal.      Pupils: Pupils are equal, round, and reactive to light.   Neck:      Thyroid: No thyromegaly.      Vascular: No JVD.      Trachea: No tracheal deviation.   Cardiovascular:      Rate and Rhythm: Normal rate and regular rhythm.      Heart sounds: Normal heart sounds. No murmur heard.  Pulmonary:      Effort: Pulmonary effort is normal. No respiratory distress.      Breath sounds: No wheezing or rales.      Comments: Tight, but slightest exp wheeze - mid lung greatest  Minimal supraclavicular retractions  Chest:      Chest wall: No tenderness.   Abdominal:      General: Bowel sounds are normal. There is no distension.      Palpations: Abdomen is soft. There is no mass.      Tenderness: There is no abdominal tenderness. There is no guarding or rebound.   Musculoskeletal:         General: Normal range of motion.      Cervical back: Normal range of motion and neck supple.      Right lower leg: No edema.      Left lower leg: No edema.   Lymphadenopathy:      Cervical: No cervical adenopathy.   Skin:     General: Skin is warm and dry.   Neurological:      Mental Status: He is alert and oriented to person, place, and time.      Cranial Nerves: No cranial nerve deficit.      Motor: No abnormal muscle tone.      Coordination: Coordination normal.      Deep Tendon Reflexes: Reflexes are normal and symmetric. Reflexes normal.   Psychiatric:         Behavior: Behavior normal.         Thought Content: Thought content normal.         Judgment: Judgment normal.      Comments: Improved disposition         CRANIAL NERVES     CN III, IV, VI   Pupils are equal, round, and reactive to light.     Significant Labs: All pertinent labs within the past 24 hours have been reviewed.  Lab Results   Component Value Date    WBC 13.07 (H) 12/27/2022    HGB 15.6 12/27/2022    HCT 44.7 12/27/2022    MCV 96 12/27/2022     12/27/2022       BMP  Lab Results   Component Value Date     12/27/2022    K 4.2 12/27/2022      12/27/2022    CO2 25 12/27/2022    BUN 11 12/27/2022    CREATININE 0.7 12/27/2022    CALCIUM 9.8 12/27/2022    ANIONGAP 11 12/27/2022    EGFRNORACEVR >60 12/27/2022     Lab Results   Component Value Date    ALT 26 12/27/2022    AST 23 12/27/2022    ALKPHOS 79 12/27/2022    BILITOT 0.5 12/27/2022   BNP  Recent Labs   Lab 12/27/22  1837   BNP 27           Recent Labs   Lab 12/27/22  1836   CPK 68   TROPONINI 0.008  0.008       Covid negative   Flu negative     Urinalysis  Recent Labs   Lab 12/28/22  1515   COLORU Yellow   SPECGRAV 1.015   PHUR 7.0   PROTEINUA Negative   NITRITE Negative   LEUKOCYTESUR Negative   UROBILINOGEN Negative          9/8/22 quantiferon GOLD negative     Significant Imaging:     CXR No acute radiographic abnormality    EKG Normal sinus rhythm  Biatrial enlargement  Incomplete right bundle branch block  Abnormal ECG  When compared with ECG of 22-DEC-2022 10:52,  Criteria for Septal infarct are no longer Present  Nonspecific T wave abnormality no longer evident in Anterior leads

## 2023-01-01 NOTE — ASSESSMENT & PLAN NOTE
start low dose cymbalta for mood as well as chronic neuropathy pain/numbness in legs   add ativan very low dose for sleep as needed (he is very anxious as well)  Tolerating cymbalta well and ativan is helping him rest well     1/1  Cont on cymbalta and titrate outpatient.

## 2023-01-02 NOTE — ASSESSMENT & PLAN NOTE
Steroids and nebs  F/u pulm outpt as scheduled for bronch 2023  Tolerated mucormyst very well. Repeat dose today.  Can taper steroid.      End stage. Awaiting bronch on . Check 6 min walk. Can d/c home with continued prednisone taper, mucinex, breathing treatments and anoro.        Date Performed: 2023     1)         Patient's O2 Sat on room air, while at rest: 92%                                      If O2 sats on room air at rest are 88% or below, patient qualifies. No additional testing needed. Document N/A in steps 2 and 3. If 89% or above, complete steps 2.        2)         Patient's O2 Sat on room air while exercisin%                               If O2 sats on room air while exercising remain 89% or above patient does not qualify, no further testing needed Document N/A in step 3. If O2 sats on room air while exercising are 88% or below, continue to step 3.        3)         Patient's O2 Sat while exercising on O2: 92% at 3 LPM                                           (Must show improvement from #2 for patients to qualify)     If O2 sats improve on oxygen, patient qualifies for portable oxygen. If not, the patient does not qualify.         Needs oxygen

## 2023-01-02 NOTE — PROGRESS NOTES
[place] - [facility] ([facility])  [facility] Medicine  Progress Note    Patient Name: Brando Culp Jr.  MRN: [id number]  Patient Class: IP- Inpatient   Admission Date: 12/27/2022  Length of Stay: 5 days  Attending Physician: Tobi Bahena MD  Primary Care Provider: Dioni Matson MD        Subjective:     Principal Problem:COPD exacerbation        HPI:  61yo male patient with hx of arthritis COPD and HTN presented to ER with c/o SOB. Started last Thursday and has progressed to unable to take more than a few steps due to BANGURA. Pt has end stage COPD and was seen last week in ER and sent home on ABX. Underlying lung lesions scheduled for bronchoscopy int he beginning of next month. Quantiferon gold 9/2022 was negative. He was given 3 stacked nebs as well as medrol 125mg in ER. No fever. Sat % on RA. WBC 48886 (down from 72009 last week). CXR with chronic changes. No acute infiltrates. PCO2 41. PO2 72 and bicarb 29 on RA. Pt was placed in observation for COPD exacerbation for nebs and IV steroids. He reports that he still feels SOB- feels like he hits a brick wall when tryiong to taskr a deep breath in. Denies fever at home but + sweats and chills. Started about a month ago when he had the flu. He is feeling a little better this am from admission     12/2/22>>> ct chest    Progressive central cavitation of the thick wall lesion in the left lower lobe since the prior examination.   Calcified nodules and bronchiectasis in the left upper lobe.   Irregular areas of consolidation in the right middle lobe and left upper lobe which have increased in prominence compared to the prior study.  A PET-CT scan could be obtained for further assessment to evaluate the significance of these findings.   Emphysematous changes in the lungs bilaterally.      Overview/Hospital Course:  12/29 pt here for COPD exacerbation. Today is day 2 of nebs every 4hr and medrol 125mg IV every 6hr. He remains on 1L NC but just for comfort. POX 95%.  Still REED but SOB is better. VSS/afebrile. Seemed anxious yesterday/depressed. Started Cymbalta as well as ativan at night to help with sleep. Slept well.     12/30 pt here for end stage COPD exacerbation- still tight and wheezing yesterday continued 125mg medrol q 6 hr but started 80mg overnight. He does report improved SOB/BANGURA- shorter recovery when getting up to bathroom and coming back. Remains on 1L NC for comfort. Sats 92-96%- does not have home O2. Not as tight but still tight. Coughing but not bringing up much. Feels congested. Ativan at night helping with sleep and tolerating the cymbalta well.     12/31  Much improved breathing this am. He is feeling less dyspneic. He had a bm yesterday. Still some tightness, but cough improved. Afebrile. Tolerated mucormyst well yesterday but bipap gave him a headache.    1/1  Wheezing much improved. He feels much better but is still very dyspneic with movement. Not requiring o2, but will check 6 min walk this am. Has done well with mucormyst/acapella/mucinex. Afebrile. BP elevated yesterday - starting hctz today. Notes he feels like cymbalta is helping.    1/2/23  Looks like back to baseline   Needs bronch 2/9   Needs oxygen to go home               Review of Systems   Constitutional:  Positive for unexpected weight change (15# in last month). Negative for activity change, appetite change, chills, diaphoresis and fever.   HENT:  Negative for congestion, sinus pressure and sore throat.    Respiratory:  Positive for cough and shortness of breath. Negative for wheezing.    Cardiovascular:  Negative for palpitations and leg swelling.        Chest tightness   Gastrointestinal:  Negative for abdominal pain, diarrhea, nausea and vomiting.   Genitourinary:  Negative for dysuria, frequency and urgency.   Musculoskeletal:  Positive for arthralgias. Negative for myalgias.        Legs feel numb and tingling    Skin:  Negative for rash and wound.   Neurological:  Negative for  dizziness, syncope, light-headedness and headaches (better with suppleemntal O2).   Psychiatric/Behavioral:  Negative for dysphoric mood and sleep disturbance. The patient is not nervous/anxious.    Objective:     Vital Signs (Most Recent):  Temp: 98.3 °F (36.8 °C) (01/02/23 0709)  Pulse: 73 (01/02/23 0800)  Resp: 20 (01/02/23 0853)  BP: (!) 141/78 (01/02/23 0709)  SpO2: (!) 94 % (01/02/23 0726)   Vital Signs (24h Range):  Temp:  [97 °F (36.1 °C)-98.3 °F (36.8 °C)] 98.3 °F (36.8 °C)  Pulse:  [58-76] 73  Resp:  [14-20] 20  SpO2:  [91 %-97 %] 94 %  BP: (116-150)/(63-84) 141/78     Weight: 68 kg (149 lb 14.6 oz)  Body mass index is 21.51 kg/m².    Intake/Output Summary (Last 24 hours) at 1/2/2023 0906  Last data filed at 1/2/2023 0840  Gross per 24 hour   Intake 1094 ml   Output --   Net 1094 ml      Physical Exam  Constitutional:       Appearance: Normal appearance. He is well-developed. He is not ill-appearing.      Comments: thin   HENT:      Head: Normocephalic and atraumatic.      Right Ear: External ear normal.      Left Ear: External ear normal.      Nose: Nose normal.      Mouth/Throat:      Mouth: Mucous membranes are moist.      Pharynx: No oropharyngeal exudate or posterior oropharyngeal erythema.   Eyes:      General: No scleral icterus.        Right eye: No discharge.         Left eye: No discharge.      Conjunctiva/sclera: Conjunctivae normal.      Pupils: Pupils are equal, round, and reactive to light.   Neck:      Thyroid: No thyromegaly.      Vascular: No JVD.      Trachea: No tracheal deviation.   Cardiovascular:      Rate and Rhythm: Normal rate and regular rhythm.      Heart sounds: Normal heart sounds. No murmur heard.  Pulmonary:      Effort: Pulmonary effort is normal. No respiratory distress.      Breath sounds: No wheezing or rales.      Comments: Tight, but slightest exp wheeze - mid lung greatest  Minimal supraclavicular retractions  Chest:      Chest wall: No tenderness.   Abdominal:       General: Bowel sounds are normal. There is no distension.      Palpations: Abdomen is soft. There is no mass.      Tenderness: There is no abdominal tenderness. There is no guarding or rebound.   Musculoskeletal:         General: Normal range of motion.      Cervical back: Normal range of motion and neck supple.      Right lower leg: No edema.      Left lower leg: No edema.   Lymphadenopathy:      Cervical: No cervical adenopathy.   Skin:     General: Skin is warm and dry.   Neurological:      Mental Status: He is alert and oriented to person, place, and time.      Cranial Nerves: No cranial nerve deficit.      Motor: No abnormal muscle tone.      Coordination: Coordination normal.      Deep Tendon Reflexes: Reflexes are normal and symmetric. Reflexes normal.   Psychiatric:         Behavior: Behavior normal.         Thought Content: Thought content normal.         Judgment: Judgment normal.      Comments: Improved disposition         Assessment/Plan:      * COPD exacerbation  duonebs every 4hr  Medrol 125mg IV every 6hr> weaned last night to 80mg IV every 8hr  RA sats %> but was placed on 1L NC for comfort which he says helps him feel not as SOB    Still with trouble speaking in sentences. He does report shorter recovery time with BANGURA, still feels tight  Will try bipap today if he can tolerate it  mucormyst x 1.     12/31  Much improved resp exam. Still dyspneic with exertion.  No o2 requirement. Getting a lot of steroid at this point, so will wean and anticipate possible d/c home tomorrow.    1/2  Discharge today     Anxiety and depression  start low dose cymbalta for mood as well as chronic neuropathy pain/numbness in legs   add ativan very low dose for sleep as needed (he is very anxious as well)  Tolerating cymbalta well and ativan is helping him rest well     1/1  Cont on cymbalta and titrate outpatient.    1/2  Dc home    Bilateral low back pain with sciatica  Cont home gabapentin .      Stage 4 very  severe COPD by GOLD classification  Steroids and nebs  F/u pulm outpt as scheduled for bronch 2023  Tolerated mucormyst very well. Repeat dose today.  Can taper steroid.      End stage. Awaiting bronch on . Check 6 min walk. Can d/c home with continued prednisone taper, mucinex, breathing treatments and anoro.        Date Performed: 2023     1)         Patient's O2 Sat on room air, while at rest: 92%                                      If O2 sats on room air at rest are 88% or below, patient qualifies. No additional testing needed. Document N/A in steps 2 and 3. If 89% or above, complete steps 2.        2)         Patient's O2 Sat on room air while exercisin%                               If O2 sats on room air while exercising remain 89% or above patient does not qualify, no further testing needed Document N/A in step 3. If O2 sats on room air while exercising are 88% or below, continue to step 3.        3)         Patient's O2 Sat while exercising on O2: 92% at 3 LPM                                           (Must show improvement from #2 for patients to qualify)     If O2 sats improve on oxygen, patient qualifies for portable oxygen. If not, the patient does not qualify.         Needs oxygen     Essential hypertension  Cont home meds lisinopril, coreg and amlodipine   /79    136//81  Likely being pushed up by his steroids.    Will not adjust meds.    Bp remains slightly elevated, again likely steroid driven. However, will give a small dose of hctz this am.  Denies any issues or pain.      Cont home meds. Discussed addition of hctz but may be able to d/c this as his steroids taper.      Ready for discharge         VTE Risk Mitigation (From admission, onward)         Ordered     IP VTE HIGH RISK PATIENT  Once         22     Place sequential compression device  Until discontinued         22                Discharge Planning   SELVIN: 2023      Code Status: Full Code   Is the patient medically ready for discharge?:     Reason for patient still in hospital (select all that apply): Pending disposition  Discharge Plan A: Home with family                  Tobi Bahena MD  Department of Hospital Medicine   [place] - [facility] (3rd Fl)

## 2023-01-02 NOTE — PLAN OF CARE
01/02/23 1128   Post-Acute Status   Post-Acute Authorization HME   HME Status Set-up Complete/Auth obtained   Discharge Delays None known at this time   Discharge Plan   Discharge Plan A Home;Home with family   Discharge Plan B Home;Home with family       Oxygen for home use approved by [place] with [employer]. Nurse notified.

## 2023-01-02 NOTE — SUBJECTIVE & OBJECTIVE
Review of Systems   Constitutional:  Positive for unexpected weight change (15# in last month). Negative for activity change, appetite change, chills, diaphoresis and fever.   HENT:  Negative for congestion, sinus pressure and sore throat.    Respiratory:  Positive for cough and shortness of breath. Negative for wheezing.    Cardiovascular:  Negative for palpitations and leg swelling.        Chest tightness   Gastrointestinal:  Negative for abdominal pain, diarrhea, nausea and vomiting.   Genitourinary:  Negative for dysuria, frequency and urgency.   Musculoskeletal:  Positive for arthralgias. Negative for myalgias.        Legs feel numb and tingling    Skin:  Negative for rash and wound.   Neurological:  Negative for dizziness, syncope, light-headedness and headaches (better with suppleemntal O2).   Psychiatric/Behavioral:  Negative for dysphoric mood and sleep disturbance. The patient is not nervous/anxious.    Objective:     Vital Signs (Most Recent):  Temp: 98.3 °F (36.8 °C) (01/02/23 0709)  Pulse: 73 (01/02/23 0800)  Resp: 20 (01/02/23 0853)  BP: (!) 141/78 (01/02/23 0709)  SpO2: (!) 94 % (01/02/23 0726)   Vital Signs (24h Range):  Temp:  [97 °F (36.1 °C)-98.3 °F (36.8 °C)] 98.3 °F (36.8 °C)  Pulse:  [58-76] 73  Resp:  [14-20] 20  SpO2:  [91 %-97 %] 94 %  BP: (116-150)/(63-84) 141/78     Weight: 68 kg (149 lb 14.6 oz)  Body mass index is 21.51 kg/m².    Intake/Output Summary (Last 24 hours) at 1/2/2023 0906  Last data filed at 1/2/2023 0840  Gross per 24 hour   Intake 1094 ml   Output --   Net 1094 ml      Physical Exam  Constitutional:       Appearance: Normal appearance. He is well-developed. He is not ill-appearing.      Comments: thin   HENT:      Head: Normocephalic and atraumatic.      Right Ear: External ear normal.      Left Ear: External ear normal.      Nose: Nose normal.      Mouth/Throat:      Mouth: Mucous membranes are moist.      Pharynx: No oropharyngeal exudate or posterior oropharyngeal  erythema.   Eyes:      General: No scleral icterus.        Right eye: No discharge.         Left eye: No discharge.      Conjunctiva/sclera: Conjunctivae normal.      Pupils: Pupils are equal, round, and reactive to light.   Neck:      Thyroid: No thyromegaly.      Vascular: No JVD.      Trachea: No tracheal deviation.   Cardiovascular:      Rate and Rhythm: Normal rate and regular rhythm.      Heart sounds: Normal heart sounds. No murmur heard.  Pulmonary:      Effort: Pulmonary effort is normal. No respiratory distress.      Breath sounds: No wheezing or rales.      Comments: Tight, but slightest exp wheeze - mid lung greatest  Minimal supraclavicular retractions  Chest:      Chest wall: No tenderness.   Abdominal:      General: Bowel sounds are normal. There is no distension.      Palpations: Abdomen is soft. There is no mass.      Tenderness: There is no abdominal tenderness. There is no guarding or rebound.   Musculoskeletal:         General: Normal range of motion.      Cervical back: Normal range of motion and neck supple.      Right lower leg: No edema.      Left lower leg: No edema.   Lymphadenopathy:      Cervical: No cervical adenopathy.   Skin:     General: Skin is warm and dry.   Neurological:      Mental Status: He is alert and oriented to person, place, and time.      Cranial Nerves: No cranial nerve deficit.      Motor: No abnormal muscle tone.      Coordination: Coordination normal.      Deep Tendon Reflexes: Reflexes are normal and symmetric. Reflexes normal.   Psychiatric:         Behavior: Behavior normal.         Thought Content: Thought content normal.         Judgment: Judgment normal.      Comments: Improved disposition

## 2023-01-02 NOTE — ASSESSMENT & PLAN NOTE
start low dose cymbalta for mood as well as chronic neuropathy pain/numbness in legs   add ativan very low dose for sleep as needed (he is very anxious as well)  Tolerating cymbalta well and ativan is helping him rest well     1/1  Cont on cymbalta and titrate outpatient.    1/2  Dc home

## 2023-01-02 NOTE — PLAN OF CARE
Pt vss. Discussed discharge instructions.Pt verbalized understanding. RX sent to preferred pharmacy. Respiratory to room for pt education on oxygen use. Follow up veronika to be scheduled during business hours.       Problem: Adult Inpatient Plan of Care  Goal: Plan of Care Review  Outcome: Adequate for Care Transition  Goal: Patient-Specific Goal (Individualized)  Outcome: Adequate for Care Transition  Goal: Absence of Hospital-Acquired Illness or Injury  Outcome: Adequate for Care Transition  Goal: Optimal Comfort and Wellbeing  Outcome: Adequate for Care Transition  Goal: Readiness for Transition of Care  Outcome: Adequate for Care Transition     Problem: Adjustment to Illness COPD (Chronic Obstructive Pulmonary Disease)  Goal: Optimal Chronic Illness Coping  Outcome: Adequate for Care Transition     Problem: Functional Ability Impaired COPD (Chronic Obstructive Pulmonary Disease)  Goal: Optimal Level of Functional [place]  Outcome: Adequate for Care Transition     Problem: Infection COPD (Chronic Obstructive Pulmonary Disease)  Goal: Absence of Infection Signs and Symptoms  Outcome: Adequate for Care Transition     Problem: Oral Intake Inadequate COPD (Chronic Obstructive Pulmonary Disease)  Goal: Improved Nutrition Intake  Outcome: Adequate for Care Transition     Problem: Respiratory Compromise COPD (Chronic Obstructive Pulmonary Disease)  Goal: Effective Oxygenation and Ventilation  Outcome: Adequate for Care Transition

## 2023-01-02 NOTE — ASSESSMENT & PLAN NOTE
duonebs every 4hr  Medrol 125mg IV every 6hr> weaned last night to 80mg IV every 8hr  RA sats %> but was placed on 1L NC for comfort which he says helps him feel not as SOB    Still with trouble speaking in sentences. He does report shorter recovery time with BANGURA, still feels tight  Will try bipap today if he can tolerate it  mucormyst x 1.     12/31  Much improved resp exam. Still dyspneic with exertion.  No o2 requirement. Getting a lot of steroid at this point, so will wean and anticipate possible d/c home tomorrow.    1/2  Discharge today

## 2023-01-02 NOTE — PROGRESS NOTES
[place] - [facility] ([facility])  [facility] Medicine  Progress Note    Patient Name: Brando Culp Jr.  MRN: [id number]  Patient Class: IP- Inpatient   Admission Date: 12/27/2022  Length of Stay: 5 days  Attending Physician: Tobi Bahena MD  Primary Care Provider: Dioni Matson MD        Subjective:     Principal Problem:COPD exacerbation        HPI:  63yo male patient with hx of arthritis COPD and HTN presented to ER with c/o SOB. Started last Thursday and has progressed to unable to take more than a few steps due to BANGURA. Pt has end stage COPD and was seen last week in ER and sent home on ABX. Underlying lung lesions scheduled for bronchoscopy int he beginning of next month. Quantiferon gold 9/2022 was negative. He was given 3 stacked nebs as well as medrol 125mg in ER. No fever. Sat % on RA. WBC 39765 (down from 14360 last week). CXR with chronic changes. No acute infiltrates. PCO2 41. PO2 72 and bicarb 29 on RA. Pt was placed in observation for COPD exacerbation for nebs and IV steroids. He reports that he still feels SOB- feels like he hits a brick wall when tryiong to taskr a deep breath in. Denies fever at home but + sweats and chills. Started about a month ago when he had the flu. He is feeling a little better this am from admission     12/2/22>>> ct chest    Progressive central cavitation of the thick wall lesion in the left lower lobe since the prior examination.   Calcified nodules and bronchiectasis in the left upper lobe.   Irregular areas of consolidation in the right middle lobe and left upper lobe which have increased in prominence compared to the prior study.  A PET-CT scan could be obtained for further assessment to evaluate the significance of these findings.   Emphysematous changes in the lungs bilaterally.      Overview/Hospital Course:  12/29 pt here for COPD exacerbation. Today is day 2 of nebs every 4hr and medrol 125mg IV every 6hr. He remains on 1L NC but just for comfort. POX 95%.  Still REED but SOB is better. VSS/afebrile. Seemed anxious yesterday/depressed. Started Cymbalta as well as ativan at night to help with sleep. Slept well.     12/30 pt here for end stage COPD exacerbation- still tight and wheezing yesterday continued 125mg medrol q 6 hr but started 80mg overnight. He does report improved SOB/BANGURA- shorter recovery when getting up to bathroom and coming back. Remains on 1L NC for comfort. Sats 92-96%- does not have home O2. Not as tight but still tight. Coughing but not bringing up much. Feels congested. Ativan at night helping with sleep and tolerating the cymbalta well.     12/31  Much improved breathing this am. He is feeling less dyspneic. He had a bm yesterday. Still some tightness, but cough improved. Afebrile. Tolerated mucormyst well yesterday but bipap gave him a headache.    1/1  Wheezing much improved. He feels much better but is still very dyspneic with movement. Not requiring o2, but will check 6 min walk this am. Has done well with mucormyst/acapella/mucinex. Afebrile. BP elevated yesterday - starting hctz today. Notes he feels like cymbalta is helping.    1/2/23  Looks like back to baseline   Needs bronch 2/9   Needs oxygen to go home           No new subjective & objective note has been filed under this hospital service since the last note was generated.      Assessment/Plan:      * COPD exacerbation  duonebs every 4hr  Medrol 125mg IV every 6hr> weaned last night to 80mg IV every 8hr  RA sats %> but was placed on 1L NC for comfort which he says helps him feel not as SOB    Still with trouble speaking in sentences. He does report shorter recovery time with BANGURA, still feels tight  Will try bipap today if he can tolerate it  mucormyst x 1.     12/31  Much improved resp exam. Still dyspneic with exertion.  No o2 requirement. Getting a lot of steroid at this point, so will wean and anticipate possible d/c home tomorrow.    1/2  Discharge today     Anxiety and  depression  start low dose cymbalta for mood as well as chronic neuropathy pain/numbness in legs   add ativan very low dose for sleep as needed (he is very anxious as well)  Tolerating cymbalta well and ativan is helping him rest well       Cont on cymbalta and titrate outpatient.      Dc home    Bilateral low back pain with sciatica  Cont home gabapentin .      Stage 4 very severe COPD by GOLD classification  Steroids and nebs  F/u pulm outpt as scheduled for bronch 2023  Tolerated mucormyst very well. Repeat dose today.  Can taper steroid.      End stage. Awaiting bronch on . Check 6 min walk. Can d/c home with continued prednisone taper, mucinex, breathing treatments and anoro.        Date Performed: 2023     1)         Patient's O2 Sat on room air, while at rest: 92%                                      If O2 sats on room air at rest are 88% or below, patient qualifies. No additional testing needed. Document N/A in steps 2 and 3. If 89% or above, complete steps 2.        2)         Patient's O2 Sat on room air while exercisin%                               If O2 sats on room air while exercising remain 89% or above patient does not qualify, no further testing needed Document N/A in step 3. If O2 sats on room air while exercising are 88% or below, continue to step 3.        3)         Patient's O2 Sat while exercising on O2: 92% at 3 LPM                                           (Must show improvement from #2 for patients to qualify)     If O2 sats improve on oxygen, patient qualifies for portable oxygen. If not, the patient does not qualify.         Needs oxygen     Essential hypertension  Cont home meds lisinopril, coreg and amlodipine   /79    136//81  Likely being pushed up by his steroids.    Will not adjust meds.    Bp remains slightly elevated, again likely steroid driven. However, will give a small dose of hctz this am.  Denies any issues or  pain.    1/1  Cont home meds. Discussed addition of hctz but may be able to d/c this as his steroids taper.    1/2  Ready for discharge         VTE Risk Mitigation (From admission, onward)         Ordered     IP VTE HIGH RISK PATIENT  Once         12/27/22 2211     Place sequential compression device  Until discontinued         12/27/22 2211                Discharge Planning   SELVIN: 1/1/2023     Code Status: Full Code   Is the patient medically ready for discharge?:     Reason for patient still in hospital (select all that apply): Pending disposition  Discharge Plan A: Home with family                  Tobi Bahena MD  Department of Hospital Medicine   [place] - [facility] (3rd Fl)

## 2023-01-02 NOTE — ASSESSMENT & PLAN NOTE
Cont home meds lisinopril, coreg and amlodipine   /79    136//81  Likely being pushed up by his steroids.    Will not adjust meds.    Bp remains slightly elevated, again likely steroid driven. However, will give a small dose of hctz this am.  Denies any issues or pain.    1/1  Cont home meds. Discussed addition of hctz but may be able to d/c this as his steroids taper.    1/2  Ready for discharge

## 2023-01-03 NOTE — PROGRESS NOTES
C3 nurse spoke with Brando Culp Jr.  for a TCC post hospital discharge follow up call. The patient does not have a scheduled HOSFU appointment with Dioni Matson MD  within 5-7 days post hospital discharge date 1/2/2023. C3 nurse was unable to schedule HOSFU appointment in [place].    Message sent to PCP staff requesting they contact patient and schedule follow up appointment.

## 2023-01-03 NOTE — PLAN OF CARE
[facility] - Med Surg (3rd Fl)  Discharge Final Note    Primary Care Provider: Dioni Matson MD    Expected Discharge Date: 1/2/2023    Final Discharge Note (most recent)       Final Note - 01/03/23 1302          Final Note    Assessment Type Final Discharge Note     Anticipated Discharge Disposition Home or Self Care        Post-Acute Status    Post-Acute Authorization HME     E Status Set-up Complete/Auth obtained     Discharge Delays None known at this time                     Important Message from Medicare             Contact Info       Dioni Matson MD   Specialty: Internal Medicine   Relationship: PCP - General    [address] [facility]  [place] [address]   Phone: [phone]       Next Steps: Follow up              Patient discharging home with home oxygen through [facility].

## 2023-01-18 PROBLEM — J98.4 CAVITARY LESION OF LUNG: Status: ACTIVE | Noted: 2023-01-01

## 2023-03-13 PROBLEM — R91.1 LUNG NODULE: Status: ACTIVE | Noted: 2023-01-01

## 2023-03-13 NOTE — ED PROVIDER NOTES
Encounter Date: 3/12/2023       History     Chief Complaint   Patient presents with    Shortness of Breath    Chest Pain     Patient hx of COPD CC of SOB and CP that started this morning, states the past few days he has been having to use his home 02 more      63 YO MALE WHO COMES IN TODAY DUE TO WORSENING OF SHORTNESS OF BREATH.  THE PATIENT DOES HAVE A HISTORY OF COPD AND TOBACCO ABUSE.  HE DOES USE HOME OXYGEN AND CONTINUES TO SMOKE.  THE PATIENT ALSO ADMITS TO CHEST PAIN IN ADDITION TO THE SHORTNESS OF BREATH.       Review of patient's allergies indicates:  No Known Allergies  Past Medical History:   Diagnosis Date    Abnormal CT scan     Arthritis     COPD (chronic obstructive pulmonary disease)     Hypertension      Past Surgical History:   Procedure Laterality Date    BRONCHOALVEOLAR LAVAGE N/A 1/18/2023    Procedure: LAVAGE, BRONCHOALVEOLAR;  Surgeon: Driss Bartholomew MD;  Location: [facility];  Service: Pulmonary;  Laterality: N/A;    BRONCHOSCOPY WITH BIOPSY Bilateral 1/18/2023    Procedure: BRONCHOSCOPY, WITH BIOPSY;  Surgeon: Driss Bartholomew MD;  Location: [facility];  Service: Pulmonary;  Laterality: Bilateral;  site: lungs    BRONCHOSCOPY, WITH BRUSH BIOPSY Bilateral 1/18/2023    Procedure: BRONCHOSCOPY, WITH BRUSH BIOPSY;  Surgeon: Driss Bartholomew MD;  Location: [facility];  Service: Pulmonary;  Laterality: Bilateral;    COLONOSCOPY      COLONOSCOPY N/A 8/21/2017    Procedure: COLONOSCOPY;  Surgeon: Jessica Fink MD;  Location: [facility];  Service: Endoscopy;  Laterality: N/A;     Family History   Problem Relation Age of Onset    Emphysema Mother     Heart failure Father      Social History     Tobacco Use    Smoking status: Every Day     Packs/day: 0.10     Years: 37.00     Pack years: 3.70     Types: Cigarettes     Start date: 4/8/1979    Smokeless tobacco: Never    Tobacco comments:     4-5 daily 1/9/23   Substance Use Topics    Alcohol use: Yes     Alcohol/week: 0.0  standard drinks     Comment: once a week    Drug use: No     Review of Systems   Constitutional: Negative.    HENT: Negative.     Eyes: Negative.    Respiratory:  Positive for shortness of breath and wheezing.    Cardiovascular: Negative.    Gastrointestinal: Negative.    Genitourinary: Negative.    Musculoskeletal: Negative.    Skin: Negative.    Psychiatric/Behavioral: Negative.       Physical Exam     Initial Vitals [03/12/23 1956]   BP Pulse Resp Temp SpO2   (!) 144/76 85 (!) 26 97.6 °F (36.4 °C) (!) 94 %      MAP       --         Physical Exam    Nursing note and vitals reviewed.  Constitutional: He appears distressed.   HENT:   Head: Normocephalic and atraumatic.   Eyes: EOM are normal. Pupils are equal, round, and reactive to light.   Neck: Neck supple.   Normal range of motion.  Cardiovascular:  Normal rate, regular rhythm and normal heart sounds.           Pulmonary/Chest: He has wheezes.   Abdominal: Abdomen is soft.   Musculoskeletal:         General: Normal range of motion.      Cervical back: Normal range of motion and neck supple.     Neurological: He is alert and oriented to person, place, and time.   Skin: Skin is warm.   Psychiatric: He has a normal mood and affect.       ED Course   Procedures  Labs Reviewed   CBC W/ AUTO DIFFERENTIAL - Abnormal; Notable for the following components:       Result Value    WBC 28.13 (*)     RBC 4.33 (*)     MCV 99 (*)     MCH 33.3 (*)     Immature Granulocytes 0.9 (*)     Gran # (ANC) 23.6 (*)     Immature Grans (Abs) 0.26 (*)     Mono # 2.6 (*)     Gran % 83.9 (*)     Lymph % 5.5 (*)     All other components within normal limits   COMPREHENSIVE METABOLIC PANEL - Abnormal; Notable for the following components:    Sodium 132 (*)     Glucose 113 (*)     BUN 5 (*)     Albumin 3.3 (*)     ALT 9 (*)     All other components within normal limits   INFLUENZA A & B BY MOLECULAR   CULTURE, BLOOD   CULTURE, BLOOD   B-TYPE NATRIURETIC PEPTIDE   SARS-COV-2 RNA AMPLIFICATION,  QUAL   TROPONIN I   LACTIC ACID, PLASMA     EKG Readings: (Independently Interpreted)   EKG REVEALED A RATE OF 82 WITH NO ACUTE ST OR T WAVE CHANGES      Imaging Results              X-Ray Chest AP Portable (Final result)  Result time 03/12/23 21:01:04      Final result by Adi Giron MD (03/12/23 21:01:04)                   Impression:      See above comments.  Follow-up recommended.      Electronically signed by: Adi Giron  Date:    03/12/2023  Time:    21:01               Narrative:    EXAMINATION:  XR CHEST AP PORTABLE    CLINICAL HISTORY:  Dyspnea, unspecified    TECHNIQUE:  Single frontal view of the chest was performed.    COMPARISON:  01/18/2023, CT 03/08/2023    FINDINGS:  Cardiac silhouette is within normal limits.    Bilateral interstitial and mild patchy/nodular alveolar infiltrates may be associated with pneumonitis.  This is improved at the left lung base though slightly increased in the right lung.  Follow-up recommended.    Left lower lobe cavitary lesion is better visualized on the prior CT.  Possible underlying bilateral small pulmonary nodules.  See prior CT report.    No effusion or pneumothorax.  No lobar evidence of lobar consolidation.  No acute osseous abnormality.    Emphysematous changes.                                    X-Rays:   Independently Interpreted Readings:   Other Readings:  CHEST RADIOGRAPH IS PENDING.     CHEST RADIOGRAPH REVEALED EMPHYSEMATOUS CHANGES.    Medications   levoFLOXacin 500 mg/100 mL IVPB 500 mg ( Intravenous Restarted 3/12/23 2235)   albuterol-ipratropium 2.5 mg-0.5 mg/3 mL nebulizer solution 3 mL (3 mLs Nebulization Given 3/12/23 2018)   methylPREDNISolone sodium succinate injection 125 mg (125 mg Intramuscular Given 3/12/23 2050)   magnesium sulfate 2g in water 50mL IVPB (premix) (0 g Intravenous Stopped 3/12/23 2235)     Medical Decision Making:   Differential Diagnosis:   COPD EXACERBATION, PNEUMONIA, HYPOXIA, STEMI  63 YO MALE WHO COMES IN TODAY DUE  TO WORSENING SHORTNESS OF BREATH.  HE DOES HAVE  A HISTORY OF COPD WITH HOME OXYGEN USE.  HIS EVALUATION IS PENDING.     ED EVALUATION REVEALED EMPHYSEMATOUS/COPD EXACERBATION CHANGES.  I HAVE SPOKEN WITH THE FAMILY AND  PATIENT IN REGARDS TO BEING ADMITTED.  THEY ARE IN AGREEMENT.  WILL REACH OUT TO DR. FRANKLIN FOR ADMISSION.  I HAVE SPOKEN  WITH DR. FRANKLIN IN REGARDS TO ADMISSION.  HE HAS ACCEPTED THE PATIENT.  HE IS PENDING ADMISSION.                         Clinical Impression:   Final diagnoses:  [R06.00] Dyspnea  [J44.1] COPD exacerbation (Primary)  [R06.00] Dyspnea, unspecified type  [Z99.81] On home oxygen therapy        ED Disposition Condition    Admit Stable                Leidy Miller MD  03/12/23 2656

## 2023-03-13 NOTE — NURSING
Pt up from ed via stretcher report from noemy rn oriented pt to room and surroundings call light in reach plan of care reviewed with pt no distress noted o2 4l nc in use

## 2023-03-13 NOTE — PLAN OF CARE
[facility] - Med Surg (3rd Fl)  Initial Discharge Assessment       Primary Care Provider: Dioni Matson MD    Admission Diagnosis: Dyspnea [R06.00]  COPD exacerbation [J44.1]  On home oxygen therapy [Z99.81]  Dyspnea, unspecified type [R06.00]    Admission Date: 3/12/2023  Expected Discharge Date:     Discharge Barriers Identified: None    Payor: MEDICARE / Plan: MEDICARE PART A & B / Product Type: Government /     Extended Emergency Contact Information  Primary Emergency Contact: Lloyd Culp   [place]  Mobile Phone: [phone]  Relation: Daughter  Secondary Emergency Contact: Elzbieta Culpsey  Address: [address] [place] [address] [id number] [place] [address]  Home Phone: [phone]  Mobile Phone: [phone]  Relation: Daughter    Discharge Plan A: Home with family  Discharge Plan B: Home with family      [facility]  [address] [facility] [place] [address]  Phone: [phone] Fax: [phone]    [place] [id number] [place], [place] - [address] [facility]  [address] [facility] [place] [address]  Phone: [phone] Fax: [phone]    [facility] - [place] [place] - [facility]  [place] [address]  Phone: [phone] Fax: [phone]      Initial Assessment (most recent)       Adult Discharge Assessment - 03/13/23 1005          Discharge Assessment    Assessment Type Discharge Planning Assessment     Confirmed/corrected address, phone number and insurance Yes     Confirmed Demographics Correct on Facesheet     Source of Information patient     Communicated SELVIN with patient/caregiver Date not available/Unable to determine     Reason For Admission COPD Exacerbation     People in Home child(shirley), adult     Facility Arrived From: Home     Do you expect to return to your current living situation? Yes     Do you have help at home or someone to help you manage your  care at home? Yes     Who are your caregiver(s) and their phone number(s)? Lloyd Culp (Daughter) [phone]     Prior to hospitilization cognitive status: Alert/Oriented     Current cognitive status: Alert/Oriented     Equipment Currently Used at Home oxygen;nebulizer     Readmission within 30 days? No     Patient currently being followed by outpatient case management? No     Do you currently have service(s) that help you manage your care at home? No     Do you take prescription medications? Yes     Do you have any problems affording any of your prescribed medications? No     How do you get to doctors appointments? car, drives self;family or friend will provide     Are you on dialysis? No     Do you take coumadin? No     Discharge Plan A Home with family     Discharge Plan B Home with family     DME Needed Upon Discharge  none     Discharge Plan discussed with: Patient     Discharge Barriers Identified None                      Discharge assessment completed with patient. Denies any post-acute care needs at this time. SW to remain available.

## 2023-03-13 NOTE — H&P
=[facility] ([address] [place] [address] Medicine  History & Physical    Patient Name: Brando Culp Jr.  MRN: [id number]  Patient Class: IP- Inpatient  Admission Date: 3/12/2023  Attending Physician: Deniz Sheppard MD   Primary Care Provider: Dioni Matson MD         Patient information was obtained from patient and ER records.     Subjective:     Principal Problem:COPD exacerbation    Chief Complaint:   Chief Complaint   Patient presents with    Shortness of Breath    Chest Pain     Patient hx of COPD CC of SOB and CP that started this morning, states the past few days he has been having to use his home 02 more         HPI: Patient is a 62 year old male with medical history of HTN, COPD stage 4 (on 3-4 liters at home), lung lesion (recent lesions on left lung/PET scan pending 3/15) who presented to the ED with chest tightness for one week.  Patient has had increased cough and sputum production.  His sputum has changed from clear to green.  He denies fever, CP, nausea and vomiting.      Admitted for COPD exacerbation.  Scheduled nebulizer, IV steroids and IV abx.        Past Medical History:   Diagnosis Date    Abnormal CT scan     Arthritis     COPD (chronic obstructive pulmonary disease)     Hypertension        Past Surgical History:   Procedure Laterality Date    BRONCHOALVEOLAR LAVAGE N/A 1/18/2023    Procedure: LAVAGE, BRONCHOALVEOLAR;  Surgeon: Driss Bartholomew MD;  Location: [facility];  Service: Pulmonary;  Laterality: N/A;    BRONCHOSCOPY WITH BIOPSY Bilateral 1/18/2023    Procedure: BRONCHOSCOPY, WITH BIOPSY;  Surgeon: Driss Bartholomew MD;  Location: [facility];  Service: Pulmonary;  Laterality: Bilateral;  site: lungs    BRONCHOSCOPY, WITH BRUSH BIOPSY Bilateral 1/18/2023    Procedure: BRONCHOSCOPY, WITH BRUSH BIOPSY;  Surgeon: Driss Bartholomew MD;  Location: [facility];  Service: Pulmonary;  Laterality: Bilateral;    COLONOSCOPY      COLONOSCOPY N/A 8/21/2017    Procedure:  COLONOSCOPY;  Surgeon: Jessica Fink MD;  Location: [facility];  Service: Endoscopy;  Laterality: N/A;       Review of patient's allergies indicates:  No Known Allergies    No current facility-administered medications on file prior to encounter.     Current Outpatient Medications on File Prior to Encounter   Medication Sig    albuterol (PROVENTIL HFA) 90 mcg/actuation inhaler Inhale 2 puffs into the lungs every 6 (six) hours as needed for Wheezing.    albuterol (PROVENTIL/VENTOLIN HFA) 90 mcg/actuation inhaler Inhale 1-2 puffs into the lungs every 6 (six) hours as needed for Wheezing. Rescue    albuterol-ipratropium (DUO-NEB) 2.5 mg-0.5 mg/3 mL nebulizer solution Take 3 mLs by nebulization every 6 (six) hours as needed for Wheezing. Rescue    amLODIPine (NORVASC) 10 MG tablet Take 1 tablet (10 mg total) by mouth once daily.    aspirin 81 MG Chew Take 1 tablet (81 mg total) by mouth once daily.    carvediloL (COREG) 25 MG tablet Take 1 tablet (25 mg total) by mouth 2 (two) times daily with meals.    DULoxetine (CYMBALTA) 20 MG capsule Take 1 capsule (20 mg total) by mouth once daily.    gabapentin (NEURONTIN) 300 MG capsule Take 1 capsule (300 mg total) by mouth 3 (three) times daily.    hydroCHLOROthiazide (HYDRODIURIL) 12.5 MG Tab Take 1 tablet (12.5 mg total) by mouth once daily.    LIPITOR 40 mg tablet Take 1 tablet (40 mg total) by mouth once daily.    lisinopriL (PRINIVIL,ZESTRIL) 20 MG tablet Take 2 tablets (40 mg total) by mouth once daily.    nicotine (NICODERM CQ) 7 mg/24 hr Place 1 patch onto the skin once daily.    traMADoL (ULTRAM) 50 mg tablet TAKE 1 TABLET BY MOUTH EVERY 8 HOURS AS NEEDED FOR PAIN    umeclidinium-vilanteroL (ANORO ELLIPTA) 62.5-25 mcg/actuation DsDv Inhale 1 puff into the lungs once daily. Controller    PULMO-AIDE COMPRESSOR Eva use as directed     Family History       Problem Relation (Age of Onset)    Emphysema Mother    Heart failure Father          Tobacco  Use    Smoking status: Every Day     Packs/day: 0.10     Years: 37.00     Pack years: 3.70     Types: Cigarettes     Start date: 4/8/1979    Smokeless tobacco: Never    Tobacco comments:     4-5 daily 1/9/23   Substance and Sexual Activity    Alcohol use: Yes     Alcohol/week: 0.0 standard drinks     Comment: once a week    Drug use: No    Sexual activity: Not Currently     Review of Systems   Constitutional:  Positive for fatigue. Negative for chills and fever.   Respiratory:  Positive for cough and shortness of breath.    Cardiovascular:  Negative for chest pain and leg swelling.   Gastrointestinal:  Negative for constipation, diarrhea, nausea and vomiting.   Genitourinary:  Negative for difficulty urinating and dysuria.   Musculoskeletal:  Positive for arthralgias. Negative for gait problem.   Skin:  Negative for pallor and rash.   Neurological:  Negative for weakness and numbness.   Psychiatric/Behavioral:  Negative for agitation and confusion.    Objective:     Vital Signs (Most Recent):  Temp: 98 °F (36.7 °C) (03/13/23 1111)  Pulse: 76 (03/13/23 1200)  Resp: 16 (03/13/23 1111)  BP: (!) 97/54 (03/13/23 1111)  SpO2: 95 % (03/13/23 1111)   Vital Signs (24h Range):  Temp:  [96.5 °F (35.8 °C)-98.7 °F (37.1 °C)] 98 °F (36.7 °C)  Pulse:  [68-89] 76  Resp:  [12-28] 16  SpO2:  [90 %-98 %] 95 %  BP: ()/(54-83) 97/54     Weight: 64.5 kg (142 lb 3.2 oz)  Body mass index is 20.4 kg/m².    Physical Exam  Constitutional:       Appearance: Normal appearance.   Cardiovascular:      Rate and Rhythm: Normal rate and regular rhythm.   Pulmonary:      Effort: No respiratory distress.      Comments: Bronchospastic     Abdominal:      General: Abdomen is flat.      Palpations: Abdomen is soft.   Neurological:      Mental Status: He is alert and oriented to person, place, and time. Mental status is at baseline.   Psychiatric:         Mood and Affect: Mood normal.         Behavior: Behavior normal.           Significant  Labs: All pertinent labs within the past 24 hours have been reviewed.  A1C: No results for input(s): HGBA1C in the last 4320 hours.  ABGs: No results for input(s): PH, PCO2, HCO3, POCSATURATED, BE, TOTALHB, COHB, METHB, O2HB, POCFIO2, PO2 in the last 48 hours.  Bilirubin:   Recent Labs   Lab 03/12/23 2019   BILITOT 0.6     BMP:   Recent Labs   Lab 03/13/23  0810   *      K 4.7   CL 97   CO2 30*   BUN 7*   CREATININE 0.6   CALCIUM 9.9     CBC:   Recent Labs   Lab 03/12/23 2019 03/13/23  0810   WBC 28.13* 16.92*   HGB 14.4 14.4   HCT 42.7 43.7    342     CMP:   Recent Labs   Lab 03/12/23 2019 03/13/23  0810   * 137   K 4.5 4.7   CL 95 97   CO2 27 30*   * 126*   BUN 5* 7*   CREATININE 0.6 0.6   CALCIUM 9.6 9.9   PROT 6.9  --    ALBUMIN 3.3*  --    BILITOT 0.6  --    ALKPHOS 93  --    AST 12  --    ALT 9*  --    ANIONGAP 10 10     Cardiac Markers:   Recent Labs   Lab 03/12/23 2019   BNP 70     Coagulation: No results for input(s): PT, INR, APTT in the last 48 hours.  Lactic Acid:   Recent Labs   Lab 03/12/23  2128   LACTATE 1.0     Lipase: No results for input(s): LIPASE in the last 48 hours.  Lipid Panel: No results for input(s): CHOL, HDL, LDLCALC, TRIG, CHOLHDL in the last 48 hours.  Magnesium: No results for input(s): MG in the last 48 hours.  Troponin:   Recent Labs   Lab 03/12/23 2019   TROPONINI <0.006     TSH: No results for input(s): TSH in the last 4320 hours.  Urine Culture: No results for input(s): LABURIN in the last 48 hours.  Urine Studies: No results for input(s): COLORU, APPEARANCEUA, PHUR, SPECGRAV, PROTEINUA, GLUCUA, KETONESU, BILIRUBINUA, OCCULTUA, NITRITE, UROBILINOGEN, LEUKOCYTESUR, RBCUA, WBCUA, BACTERIA, SQUAMEPITHEL, HYALINECASTS in the last 48 hours.    Invalid input(s): ZULAY    Significant Imaging: I have reviewed all pertinent imaging results/findings within the past 24 hours.    CXR   Cardiac silhouette is within normal limits.     Bilateral  interstitial and mild patchy/nodular alveolar infiltrates may be associated with pneumonitis.  This is improved at the left lung base though slightly increased in the right lung.  Follow-up recommended.     Left lower lobe cavitary lesion is better visualized on the prior CT.  Possible underlying bilateral small pulmonary nodules.  See prior CT report.     No effusion or pneumothorax.  No lobar evidence of lobar consolidation.  No acute osseous abnormality.     Emphysematous changes.     Assessment/Plan:     * COPD exacerbation  Reason for admission  CXR: lower lobe cavitary lesion.  No lobar consolidation.  Bilateral interstitial patchy infiltrates. Left worse than right.  Emphysematous changes.    Scheduled nebulizer tx, IV levofloxacin and IV solumedrol 40mg q8.  Supportive care.      Lung nodule  Plan for PET test 3/15      Tobacco dependence  Nicotine patch       Bilateral low back pain with sciatica    Continue home tramadol and gabapentin     Essential hypertension  Continue norvasc and HCTZ.          VTE Risk Mitigation (From admission, onward)         Ordered     IP VTE LOW RISK PATIENT  Once         03/13/23 0014     Place CHIKI hose  Until discontinued         03/13/23 0014                   TERRY BrightC  Department of Hospital Medicine   [place] - [facility] Surg (3rd Fl)

## 2023-03-13 NOTE — HPI
Patient is a 62 year old male with medical history of HTN, COPD stage 4 (on 3-4 liters at home), lung lesion (recent lesions on left lung/PET scan pending 3/15) who presented to the ED with chest tightness for one week.  Patient has had increased cough and sputum production.  His sputum has changed from clear to green.  He denies fever, CP, nausea and vomiting.      Admitted for COPD exacerbation.  Scheduled nebulizer, IV steroids and IV abx.

## 2023-03-13 NOTE — SUBJECTIVE & OBJECTIVE
Past Medical History:   Diagnosis Date    Abnormal CT scan     Arthritis     COPD (chronic obstructive pulmonary disease)     Hypertension        Past Surgical History:   Procedure Laterality Date    BRONCHOALVEOLAR LAVAGE N/A 1/18/2023    Procedure: LAVAGE, BRONCHOALVEOLAR;  Surgeon: Driss Bartholomew MD;  Location: [facility];  Service: Pulmonary;  Laterality: N/A;    BRONCHOSCOPY WITH BIOPSY Bilateral 1/18/2023    Procedure: BRONCHOSCOPY, WITH BIOPSY;  Surgeon: Driss Bartholomew MD;  Location: [facility];  Service: Pulmonary;  Laterality: Bilateral;  site: lungs    BRONCHOSCOPY, WITH BRUSH BIOPSY Bilateral 1/18/2023    Procedure: BRONCHOSCOPY, WITH BRUSH BIOPSY;  Surgeon: Driss Bartholomew MD;  Location: [facility] OR;  Service: Pulmonary;  Laterality: Bilateral;    COLONOSCOPY      COLONOSCOPY N/A 8/21/2017    Procedure: COLONOSCOPY;  Surgeon: Jessica Fink MD;  Location: [facility];  Service: Endoscopy;  Laterality: N/A;       Review of patient's allergies indicates:  No Known Allergies    No current facility-administered medications on file prior to encounter.     Current Outpatient Medications on File Prior to Encounter   Medication Sig    albuterol (PROVENTIL HFA) 90 mcg/actuation inhaler Inhale 2 puffs into the lungs every 6 (six) hours as needed for Wheezing.    albuterol (PROVENTIL/VENTOLIN HFA) 90 mcg/actuation inhaler Inhale 1-2 puffs into the lungs every 6 (six) hours as needed for Wheezing. Rescue    albuterol-ipratropium (DUO-NEB) 2.5 mg-0.5 mg/3 mL nebulizer solution Take 3 mLs by nebulization every 6 (six) hours as needed for Wheezing. Rescue    amLODIPine (NORVASC) 10 MG tablet Take 1 tablet (10 mg total) by mouth once daily.    aspirin 81 MG Chew Take 1 tablet (81 mg total) by mouth once daily.    carvediloL (COREG) 25 MG tablet Take 1 tablet (25 mg total) by mouth 2 (two) times daily with meals.    DULoxetine (CYMBALTA) 20 MG capsule Take 1 capsule (20 mg total) by mouth once  daily.    gabapentin (NEURONTIN) 300 MG capsule Take 1 capsule (300 mg total) by mouth 3 (three) times daily.    hydroCHLOROthiazide (HYDRODIURIL) 12.5 MG Tab Take 1 tablet (12.5 mg total) by mouth once daily.    LIPITOR 40 mg tablet Take 1 tablet (40 mg total) by mouth once daily.    lisinopriL (PRINIVIL,ZESTRIL) 20 MG tablet Take 2 tablets (40 mg total) by mouth once daily.    nicotine (NICODERM CQ) 7 mg/24 hr Place 1 patch onto the skin once daily.    traMADoL (ULTRAM) 50 mg tablet TAKE 1 TABLET BY MOUTH EVERY 8 HOURS AS NEEDED FOR PAIN    umeclidinium-vilanteroL (ANORO ELLIPTA) 62.5-25 mcg/actuation DsDv Inhale 1 puff into the lungs once daily. Controller    PULMO-AIDE COMPRESSOR Eva use as directed     Family History       Problem Relation (Age of Onset)    Emphysema Mother    Heart failure Father          Tobacco Use    Smoking status: Every Day     Packs/day: 0.10     Years: 37.00     Pack years: 3.70     Types: Cigarettes     Start date: 4/8/1979    Smokeless tobacco: Never    Tobacco comments:     4-5 daily 1/9/23   Substance and Sexual Activity    Alcohol use: Yes     Alcohol/week: 0.0 standard drinks     Comment: once a week    Drug use: No    Sexual activity: Not Currently     Review of Systems   Constitutional:  Positive for fatigue. Negative for chills and fever.   Respiratory:  Positive for cough and shortness of breath.    Cardiovascular:  Negative for chest pain and leg swelling.   Gastrointestinal:  Negative for constipation, diarrhea, nausea and vomiting.   Genitourinary:  Negative for difficulty urinating and dysuria.   Musculoskeletal:  Positive for arthralgias. Negative for gait problem.   Skin:  Negative for pallor and rash.   Neurological:  Negative for weakness and numbness.   Psychiatric/Behavioral:  Negative for agitation and confusion.    Objective:     Vital Signs (Most Recent):  Temp: 98 °F (36.7 °C) (03/13/23 1111)  Pulse: 76 (03/13/23 1200)  Resp: 16 (03/13/23 1111)  BP: (!) 97/54  (03/13/23 1111)  SpO2: 95 % (03/13/23 1111)   Vital Signs (24h Range):  Temp:  [96.5 °F (35.8 °C)-98.7 °F (37.1 °C)] 98 °F (36.7 °C)  Pulse:  [68-89] 76  Resp:  [12-28] 16  SpO2:  [90 %-98 %] 95 %  BP: ()/(54-83) 97/54     Weight: 64.5 kg (142 lb 3.2 oz)  Body mass index is 20.4 kg/m².    Physical Exam  Constitutional:       Appearance: Normal appearance.   Cardiovascular:      Rate and Rhythm: Normal rate and regular rhythm.   Pulmonary:      Effort: No respiratory distress.      Comments: Bronchospastic     Abdominal:      General: Abdomen is flat.      Palpations: Abdomen is soft.   Neurological:      Mental Status: He is alert and oriented to person, place, and time. Mental status is at baseline.   Psychiatric:         Mood and Affect: Mood normal.         Behavior: Behavior normal.           Significant Labs: All pertinent labs within the past 24 hours have been reviewed.  A1C: No results for input(s): HGBA1C in the last 4320 hours.  ABGs: No results for input(s): PH, PCO2, HCO3, POCSATURATED, BE, TOTALHB, COHB, METHB, O2HB, POCFIO2, PO2 in the last 48 hours.  Bilirubin:   Recent Labs   Lab 03/12/23 2019   BILITOT 0.6     BMP:   Recent Labs   Lab 03/13/23  0810   *      K 4.7   CL 97   CO2 30*   BUN 7*   CREATININE 0.6   CALCIUM 9.9     CBC:   Recent Labs   Lab 03/12/23 2019 03/13/23  0810   WBC 28.13* 16.92*   HGB 14.4 14.4   HCT 42.7 43.7    342     CMP:   Recent Labs   Lab 03/12/23 2019 03/13/23  0810   * 137   K 4.5 4.7   CL 95 97   CO2 27 30*   * 126*   BUN 5* 7*   CREATININE 0.6 0.6   CALCIUM 9.6 9.9   PROT 6.9  --    ALBUMIN 3.3*  --    BILITOT 0.6  --    ALKPHOS 93  --    AST 12  --    ALT 9*  --    ANIONGAP 10 10     Cardiac Markers:   Recent Labs   Lab 03/12/23  2019   BNP 70     Coagulation: No results for input(s): PT, INR, APTT in the last 48 hours.  Lactic Acid:   Recent Labs   Lab 03/12/23 2128   LACTATE 1.0     Lipase: No results for input(s): LIPASE in  the last 48 hours.  Lipid Panel: No results for input(s): CHOL, HDL, LDLCALC, TRIG, CHOLHDL in the last 48 hours.  Magnesium: No results for input(s): MG in the last 48 hours.  Troponin:   Recent Labs   Lab 03/12/23 2019   TROPONINI <0.006     TSH: No results for input(s): TSH in the last 4320 hours.  Urine Culture: No results for input(s): LABURIN in the last 48 hours.  Urine Studies: No results for input(s): COLORU, APPEARANCEUA, PHUR, SPECGRAV, PROTEINUA, GLUCUA, KETONESU, BILIRUBINUA, OCCULTUA, NITRITE, UROBILINOGEN, LEUKOCYTESUR, RBCUA, WBCUA, BACTERIA, SQUAMEPITHEL, HYALINECASTS in the last 48 hours.    Invalid input(s): WRIGHTSUR    Significant Imaging: I have reviewed all pertinent imaging results/findings within the past 24 hours.    CXR   Cardiac silhouette is within normal limits.     Bilateral interstitial and mild patchy/nodular alveolar infiltrates may be associated with pneumonitis.  This is improved at the left lung base though slightly increased in the right lung.  Follow-up recommended.     Left lower lobe cavitary lesion is better visualized on the prior CT.  Possible underlying bilateral small pulmonary nodules.  See prior CT report.     No effusion or pneumothorax.  No lobar evidence of lobar consolidation.  No acute osseous abnormality.     Emphysematous changes.

## 2023-03-13 NOTE — TELEPHONE ENCOUNTER
Reason for Disposition   Sounds like a life-threatening emergency to the triager    Protocols used: No Protocol Available - Sick Adult-A-OH  Daughter called re pt o2 and what is the highest he can go up to?. Pt on 3L. o2 sat= 95-96%. Pt admits he is Having CP. rec EMS. Caller states they are on their way in.

## 2023-03-13 NOTE — ASSESSMENT & PLAN NOTE
Reason for admission  CXR: lower lobe cavitary lesion.  No lobar consolidation.  Bilateral interstitial patchy infiltrates. Left worse than right.  Emphysematous changes.    Scheduled nebulizer tx, IV levofloxacin and IV solumedrol 40mg q8.  Supportive care.

## 2023-03-14 PROBLEM — D72.829 LEUKOCYTOSIS: Status: ACTIVE | Noted: 2023-01-01

## 2023-03-14 NOTE — PLAN OF CARE
Problem: Adult Inpatient Plan of Care  Goal: Plan of Care Review  3/13/2023 1909 by Radha Lam RN  Outcome: Ongoing, Progressing  3/13/2023 1909 by Radha Lam RN  Outcome: Ongoing, Progressing  Goal: Optimal Comfort and Wellbeing  3/13/2023 1909 by Radha Lam RN  Outcome: Ongoing, Progressing  3/13/2023 1909 by Radha Lam RN  Outcome: Ongoing, Progressing     Problem: Gas Exchange Impaired  Goal: Optimal Gas Exchange  Outcome: Ongoing, Progressing     Problem: Pain Acute  Goal: Acceptable Pain Control and Functional Ability  Outcome: Ongoing, Progressing

## 2023-03-14 NOTE — PLAN OF CARE
Recommendation/Intervention:     1) Cont with regular adult diet; consider cardiac diet if applicable.     2) Cont with Boost Plus 1x a day any flavor to provide an additional 360 kcals and 14 g PRO.     3) Rec'd honoring pt food preferences to encourage PO intake.     4) Rec'd frequent weight measurments to assess for acute weight changes.     5) RD to follow and make rec's accordingly.    Goals: Pt will cont to meet >/= 75% ENN by RD follow-up  Nutrition Goal Status: new

## 2023-03-14 NOTE — PLAN OF CARE
Problem: Adult Inpatient Plan of Care  Goal: Plan of Care Review  Outcome: Ongoing, Progressing  Goal: Optimal Comfort and Wellbeing  Outcome: Ongoing, Progressing  Goal: Readiness for Transition of Care  Outcome: Ongoing, Progressing     Problem: Gas Exchange Impaired  Goal: Optimal Gas Exchange  Outcome: Ongoing, Progressing     Problem: Pain Acute  Goal: Acceptable Pain Control and Functional Ability  Outcome: Ongoing, Progressing

## 2023-03-14 NOTE — ASSESSMENT & PLAN NOTE
- admitted for COPD exacerbation, pneumonia  - WBC 27.95  - initial EKG NSR, no ST changes on admission  - repeat EKG NSR with concerns for ST elevation to inferior leads; re-read by Dr. Wilson as nonspecific T-wave abnormality  - trop neg x 2   - echo normal EF and mild TR, TX  - had stress/ echo 2015 also with normal EF and neg for myocardial ischemia  - is not followed by cards outpatient and does have calcified aortic and coronary lesions on imaging studies   - continue current regimen    I do not suspect ACS. Patient presents with muscular chest wall pain due to signifcant coughing. Patient reports improvement with pain relievers. Recommend outpatient appointment to establish care.

## 2023-03-14 NOTE — PROGRESS NOTES
[facility] ([address] [place] [address] Medicine  Progress Note    Patient Name: Brando Culp Jr.  MRN: [id number]  Patient Class: IP- Inpatient   Admission Date: 3/12/2023  Length of Stay: 2 days  Attending Physician: Deniz Sheppard MD  Primary Care Provider: Dioni Matson MD        Subjective:     Principal Problem:COPD exacerbation        HPI:  Patient is a 62 year old male with medical history of HTN, COPD stage 4 (on 3-4 liters at home), lung lesion (recent lesions on left lung/PET scan pending 3/15) who presented to the ED with chest tightness for one week.  Patient has had increased cough and sputum production.  His sputum has changed from clear to green.  He denies fever, CP, nausea and vomiting.      Admitted for COPD exacerbation.  Scheduled nebulizer, IV steroids and IV abx.        Overview/Hospital Course:  Patient with intermittent chest pain.  ST abnormalities seen on EKG but troponin/ d dimer unremarkable.  Cardiology consulted. Patient still with significant bronchospasm on exam.         Past Medical History:   Diagnosis Date    Abnormal CT scan     Arthritis     COPD (chronic obstructive pulmonary disease)     Hypertension        Past Surgical History:   Procedure Laterality Date    BRONCHOALVEOLAR LAVAGE N/A 1/18/2023    Procedure: LAVAGE, BRONCHOALVEOLAR;  Surgeon: Driss Bartholomew MD;  Location: [facility];  Service: Pulmonary;  Laterality: N/A;    BRONCHOSCOPY WITH BIOPSY Bilateral 1/18/2023    Procedure: BRONCHOSCOPY, WITH BIOPSY;  Surgeon: Driss Bartholomew MD;  Location: [facility];  Service: Pulmonary;  Laterality: Bilateral;  site: lungs    BRONCHOSCOPY, WITH BRUSH BIOPSY Bilateral 1/18/2023    Procedure: BRONCHOSCOPY, WITH BRUSH BIOPSY;  Surgeon: Driss Bartholomew MD;  Location: [facility];  Service: Pulmonary;  Laterality: Bilateral;    COLONOSCOPY      COLONOSCOPY N/A 8/21/2017    Procedure: COLONOSCOPY;  Surgeon: Jessica Fink MD;  Location: [facility];   Service: Endoscopy;  Laterality: N/A;       Review of patient's allergies indicates:  No Known Allergies    No current facility-administered medications on file prior to encounter.     Current Outpatient Medications on File Prior to Encounter   Medication Sig    albuterol (PROVENTIL HFA) 90 mcg/actuation inhaler Inhale 2 puffs into the lungs every 6 (six) hours as needed for Wheezing.    albuterol (PROVENTIL/VENTOLIN HFA) 90 mcg/actuation inhaler Inhale 1-2 puffs into the lungs every 6 (six) hours as needed for Wheezing. Rescue    albuterol-ipratropium (DUO-NEB) 2.5 mg-0.5 mg/3 mL nebulizer solution Take 3 mLs by nebulization every 6 (six) hours as needed for Wheezing. Rescue    amLODIPine (NORVASC) 10 MG tablet Take 1 tablet (10 mg total) by mouth once daily.    aspirin 81 MG Chew Take 1 tablet (81 mg total) by mouth once daily.    carvediloL (COREG) 25 MG tablet Take 1 tablet (25 mg total) by mouth 2 (two) times daily with meals.    DULoxetine (CYMBALTA) 20 MG capsule Take 1 capsule (20 mg total) by mouth once daily.    gabapentin (NEURONTIN) 300 MG capsule Take 1 capsule (300 mg total) by mouth 3 (three) times daily.    hydroCHLOROthiazide (HYDRODIURIL) 12.5 MG Tab Take 1 tablet (12.5 mg total) by mouth once daily.    LIPITOR 40 mg tablet Take 1 tablet (40 mg total) by mouth once daily.    lisinopriL (PRINIVIL,ZESTRIL) 20 MG tablet Take 2 tablets (40 mg total) by mouth once daily.    nicotine (NICODERM CQ) 7 mg/24 hr Place 1 patch onto the skin once daily.    traMADoL (ULTRAM) 50 mg tablet TAKE 1 TABLET BY MOUTH EVERY 8 HOURS AS NEEDED FOR PAIN    umeclidinium-vilanteroL (ANORO ELLIPTA) 62.5-25 mcg/actuation DsDv Inhale 1 puff into the lungs once daily. Controller    PULMO-AIDE COMPRESSOR Eva use as directed     Family History       Problem Relation (Age of Onset)    Emphysema Mother    Heart failure Father          Tobacco Use    Smoking status: Every Day     Packs/day: 0.10     Years: 37.00      Pack years: 3.70     Types: Cigarettes     Start date: 4/8/1979    Smokeless tobacco: Never    Tobacco comments:     4-5 daily 1/9/23   Substance and Sexual Activity    Alcohol use: Yes     Alcohol/week: 0.0 standard drinks     Comment: once a week    Drug use: No    Sexual activity: Not Currently     Review of Systems   Constitutional:  Positive for fatigue. Negative for chills and fever.   Respiratory:  Positive for cough, chest tightness and shortness of breath.    Cardiovascular:  Positive for chest pain (intermittent spasm). Negative for palpitations and leg swelling.   Gastrointestinal:  Negative for constipation, diarrhea, nausea and vomiting.   Genitourinary:  Negative for difficulty urinating and dysuria.   Musculoskeletal:  Positive for arthralgias. Negative for gait problem.   Skin:  Negative for pallor and rash.   Neurological:  Negative for weakness and numbness.   Psychiatric/Behavioral:  Negative for agitation and confusion.    Objective:     Vital Signs (Most Recent):  Temp: 98.2 °F (36.8 °C) (03/14/23 1104)  Pulse: 72 (03/14/23 1200)  Resp: 19 (03/14/23 1145)  BP: 113/63 (03/14/23 1104)  SpO2: (!) 94 % (03/14/23 1145)   Vital Signs (24h Range):  Temp:  [97.7 °F (36.5 °C)-98.4 °F (36.9 °C)] 98.2 °F (36.8 °C)  Pulse:  [56-80] 72  Resp:  [16-24] 19  SpO2:  [93 %-99 %] 94 %  BP: (113-134)/(54-72) 113/63     Weight: 64.5 kg (142 lb 3.2 oz)  Body mass index is 20.4 kg/m².    Physical Exam  Constitutional:       Appearance: Normal appearance.   Cardiovascular:      Rate and Rhythm: Normal rate and regular rhythm.   Pulmonary:      Effort: No respiratory distress.      Comments: Bronchospastic     Abdominal:      General: Abdomen is flat.      Palpations: Abdomen is soft.   Neurological:      Mental Status: He is alert and oriented to person, place, and time. Mental status is at baseline.   Psychiatric:         Mood and Affect: Mood normal.         Behavior: Behavior normal.           Significant  Labs: All pertinent labs within the past 24 hours have been reviewed.  A1C: No results for input(s): HGBA1C in the last 4320 hours.  ABGs: No results for input(s): PH, PCO2, HCO3, POCSATURATED, BE, TOTALHB, COHB, METHB, O2HB, POCFIO2, PO2 in the last 48 hours.  Bilirubin:   Recent Labs   Lab 03/12/23 2019   BILITOT 0.6       BMP:   Recent Labs   Lab 03/14/23  0552   *      K 4.6   CL 96   CO2 30*   BUN 9   CREATININE 0.6   CALCIUM 9.4       CBC:   Recent Labs   Lab 03/12/23 2019 03/13/23  0810 03/14/23  0552   WBC 28.13* 16.92* 27.95*   HGB 14.4 14.4 13.2*   HCT 42.7 43.7 40.1    342 366       CMP:   Recent Labs   Lab 03/12/23 2019 03/13/23 0810 03/14/23  0552   * 137 137   K 4.5 4.7 4.6   CL 95 97 96   CO2 27 30* 30*   * 126* 131*   BUN 5* 7* 9   CREATININE 0.6 0.6 0.6   CALCIUM 9.6 9.9 9.4   PROT 6.9  --   --    ALBUMIN 3.3*  --   --    BILITOT 0.6  --   --    ALKPHOS 93  --   --    AST 12  --   --    ALT 9*  --   --    ANIONGAP 10 10 11       Cardiac Markers:   Recent Labs   Lab 03/12/23 2019   BNP 70       Coagulation: No results for input(s): PT, INR, APTT in the last 48 hours.  Lactic Acid:   Recent Labs   Lab 03/12/23 2128   LACTATE 1.0       Lipase: No results for input(s): LIPASE in the last 48 hours.  Lipid Panel: No results for input(s): CHOL, HDL, LDLCALC, TRIG, CHOLHDL in the last 48 hours.  Magnesium: No results for input(s): MG in the last 48 hours.  Troponin:   Recent Labs   Lab 03/12/23 2019 03/14/23  0552   TROPONINI <0.006 <0.006       TSH: No results for input(s): TSH in the last 4320 hours.  Urine Culture: No results for input(s): LABURIN in the last 48 hours.  Urine Studies: No results for input(s): COLORU, APPEARANCEUA, PHUR, SPECGRAV, PROTEINUA, GLUCUA, KETONESU, BILIRUBINUA, OCCULTUA, NITRITE, UROBILINOGEN, LEUKOCYTESUR, RBCUA, WBCUA, BACTERIA, SQUAMEPITHEL, HYALINECASTS in the last 48 hours.    Invalid input(s): ZULAY    Significant Imaging: I  have reviewed all pertinent imaging results/findings within the past 24 hours.    CXR   Cardiac silhouette is within normal limits.     Bilateral interstitial and mild patchy/nodular alveolar infiltrates may be associated with pneumonitis.  This is improved at the left lung base though slightly increased in the right lung.  Follow-up recommended.     Left lower lobe cavitary lesion is better visualized on the prior CT.  Possible underlying bilateral small pulmonary nodules.  See prior CT report.     No effusion or pneumothorax.  No lobar evidence of lobar consolidation.  No acute osseous abnormality.     Emphysematous changes.     EKG  With ST change in II, III, AVF and V3       Assessment/Plan:      * COPD exacerbation  Reason for admission  CXR: lower lobe cavitary lesion.  No lobar consolidation.  Bilateral interstitial patchy infiltrates. Left worse than right.  Emphysematous changes.    Scheduled nebulizer tx, IV levofloxacin and IV solumedrol 40mg q8.  Supportive care.      Leukocytosis  Continue to trend   Treating for COPD pneumonia  U/A negative for UTI   Blood culture NGTD      Lung nodule  Plan for PET test 3/15  CM to assist with rescheduling       Tobacco dependence  Nicotine patch       Bilateral low back pain with sciatica    Continue home tramadol and gabapentin     Essential hypertension  Continue norvasc and HCTZ.        Pain in the Chest  Intermittent spasm pain with movement and rest   ST abnormalities in leads II, III, AVF and v3  Troponin and d dimer wnl  Cardiology consulted       VTE Risk Mitigation (From admission, onward)         Ordered     IP VTE LOW RISK PATIENT  Once         03/13/23 0014     Place CHIKI hose  Until discontinued         03/13/23 0014                Discharge Planning   SELVIN:      Code Status: Full Code   Is the patient medically ready for discharge?:     Reason for patient still in hospital (select all that apply): Patient new problem and Patient trending  condition  Discharge Plan A: Home with family                  Clotilde Stallworth PA-C  Department of Hospital Medicine   [facility] (3rd Fl)

## 2023-03-14 NOTE — SUBJECTIVE & OBJECTIVE
Past Medical History:   Diagnosis Date    Abnormal CT scan     Arthritis     COPD (chronic obstructive pulmonary disease)     Hypertension        Past Surgical History:   Procedure Laterality Date    BRONCHOALVEOLAR LAVAGE N/A 1/18/2023    Procedure: LAVAGE, BRONCHOALVEOLAR;  Surgeon: Driss Bartholomew MD;  Location: [facility];  Service: Pulmonary;  Laterality: N/A;    BRONCHOSCOPY WITH BIOPSY Bilateral 1/18/2023    Procedure: BRONCHOSCOPY, WITH BIOPSY;  Surgeon: Driss Bartholomew MD;  Location: [facility];  Service: Pulmonary;  Laterality: Bilateral;  site: lungs    BRONCHOSCOPY, WITH BRUSH BIOPSY Bilateral 1/18/2023    Procedure: BRONCHOSCOPY, WITH BRUSH BIOPSY;  Surgeon: Driss Bartholomew MD;  Location: [facility] OR;  Service: Pulmonary;  Laterality: Bilateral;    COLONOSCOPY      COLONOSCOPY N/A 8/21/2017    Procedure: COLONOSCOPY;  Surgeon: Jessica Fink MD;  Location: [facility];  Service: Endoscopy;  Laterality: N/A;       Review of patient's allergies indicates:  No Known Allergies    No current facility-administered medications on file prior to encounter.     Current Outpatient Medications on File Prior to Encounter   Medication Sig    albuterol (PROVENTIL HFA) 90 mcg/actuation inhaler Inhale 2 puffs into the lungs every 6 (six) hours as needed for Wheezing.    albuterol (PROVENTIL/VENTOLIN HFA) 90 mcg/actuation inhaler Inhale 1-2 puffs into the lungs every 6 (six) hours as needed for Wheezing. Rescue    albuterol-ipratropium (DUO-NEB) 2.5 mg-0.5 mg/3 mL nebulizer solution Take 3 mLs by nebulization every 6 (six) hours as needed for Wheezing. Rescue    amLODIPine (NORVASC) 10 MG tablet Take 1 tablet (10 mg total) by mouth once daily.    aspirin 81 MG Chew Take 1 tablet (81 mg total) by mouth once daily.    carvediloL (COREG) 25 MG tablet Take 1 tablet (25 mg total) by mouth 2 (two) times daily with meals.    DULoxetine (CYMBALTA) 20 MG capsule Take 1 capsule (20 mg total) by mouth once  daily.    gabapentin (NEURONTIN) 300 MG capsule Take 1 capsule (300 mg total) by mouth 3 (three) times daily.    hydroCHLOROthiazide (HYDRODIURIL) 12.5 MG Tab Take 1 tablet (12.5 mg total) by mouth once daily.    LIPITOR 40 mg tablet Take 1 tablet (40 mg total) by mouth once daily.    lisinopriL (PRINIVIL,ZESTRIL) 20 MG tablet Take 2 tablets (40 mg total) by mouth once daily.    nicotine (NICODERM CQ) 7 mg/24 hr Place 1 patch onto the skin once daily.    traMADoL (ULTRAM) 50 mg tablet TAKE 1 TABLET BY MOUTH EVERY 8 HOURS AS NEEDED FOR PAIN    umeclidinium-vilanteroL (ANORO ELLIPTA) 62.5-25 mcg/actuation DsDv Inhale 1 puff into the lungs once daily. Controller    PULMO-AIDE COMPRESSOR Eva use as directed     Family History       Problem Relation (Age of Onset)    Emphysema Mother    Heart failure Father          Tobacco Use    Smoking status: Every Day     Packs/day: 0.10     Years: 37.00     Pack years: 3.70     Types: Cigarettes     Start date: 4/8/1979    Smokeless tobacco: Never    Tobacco comments:     4-5 daily 1/9/23   Substance and Sexual Activity    Alcohol use: Yes     Alcohol/week: 0.0 standard drinks     Comment: once a week    Drug use: No    Sexual activity: Not Currently     Review of Systems   Constitutional: Negative.   Cardiovascular:  Positive for chest pain and dyspnea on exertion. Negative for claudication, cyanosis, irregular heartbeat, leg swelling, near-syncope, orthopnea, palpitations, paroxysmal nocturnal dyspnea and syncope.   Respiratory:  Positive for cough, shortness of breath and wheezing.    Skin: Negative.    Musculoskeletal: Negative.    Neurological:  Positive for weakness.   Objective:     Vital Signs (Most Recent):  Temp: 98.2 °F (36.8 °C) (03/14/23 1104)  Pulse: 72 (03/14/23 1200)  Resp: 19 (03/14/23 1145)  BP: 113/63 (03/14/23 1104)  SpO2: (!) 94 % (03/14/23 1145)   Vital Signs (24h Range):  Temp:  [97.7 °F (36.5 °C)-98.4 °F (36.9 °C)] 98.2 °F (36.8 °C)  Pulse:  [56-80]  72  Resp:  [16-24] 19  SpO2:  [93 %-99 %] 94 %  BP: (113-134)/(54-72) 113/63     Weight: 64.5 kg (142 lb 3.2 oz)  Body mass index is 20.4 kg/m².    SpO2: (!) 94 %         Intake/Output Summary (Last 24 hours) at 3/14/2023 1327  Last data filed at 3/14/2023 0457  Gross per 24 hour   Intake 391.1 ml   Output 2075 ml   Net -1683.9 ml       Lines/Drains/Airways       Peripheral Intravenous Line  Duration                  Peripheral IV - Single Lumen 03/12/23 2025 18 G Anterior;Left Forearm 1 day         Peripheral IV - Single Lumen 03/12/23 2045 20 G Anterior;Left Hand 1 day                    Physical Exam  HENT:      Head: Normocephalic.   Cardiovascular:      Rate and Rhythm: Normal rate and regular rhythm.      Heart sounds: Normal heart sounds.   Pulmonary:      Effort: Pulmonary effort is normal.   Musculoskeletal:         General: Normal range of motion.   Skin:     General: Skin is warm and dry.   Neurological:      Mental Status: He is alert and oriented to person, place, and time.   Psychiatric:         Mood and Affect: Mood normal.       Significant Labs: BMP:   Recent Labs   Lab 03/12/23 2019 03/13/23  0810 03/14/23  0552   * 126* 131*   * 137 137   K 4.5 4.7 4.6   CL 95 97 96   CO2 27 30* 30*   BUN 5* 7* 9   CREATININE 0.6 0.6 0.6   CALCIUM 9.6 9.9 9.4   , CBC   Recent Labs   Lab 03/12/23 2019 03/13/23  0810 03/14/23  0552   WBC 28.13* 16.92* 27.95*   HGB 14.4 14.4 13.2*   HCT 42.7 43.7 40.1    342 366   , Lipid Panel No results for input(s): CHOL, HDL, LDLCALC, TRIG, CHOLHDL in the last 48 hours., and Troponin   Recent Labs   Lab 03/12/23 2019 03/14/23  0552   TROPONINI <0.006 <0.006       Significant Imaging: Echocardiogram: Transthoracic echo (TTE) complete (Cupid Only):   Results for orders placed or performed during the hospital encounter of 03/12/23   Echo   Result Value Ref Range    BSA 1.78 m2    TDI SEPTAL 0.11 m/s    LV LATERAL E/E' RATIO 8.40 m/s    LV SEPTAL E/E' RATIO  7.64 m/s    LA WIDTH 2.80 cm    IVC diameter 18 cm    Left Ventricular Outflow Tract Mean Velocity 0.64 cm/s    Left Ventricular Outflow Tract Mean Gradient 1.81 mmHg    Pulmonary Valve Mean Velocity 0.68 m/s    TDI LATERAL 0.10 m/s    PV PEAK VELOCITY 0.87 cm/s    LVIDd 5.13 3.5 - 6.0 cm    IVS 0.78 0.6 - 1.1 cm    Posterior Wall 0.84 0.6 - 1.1 cm    LVIDs 3.02 2.1 - 4.0 cm    FS 41 28 - 44 %    LA volume 27.53 cm3    Sinus 3.20 cm    STJ 2.88 cm    Ascending aorta 2.82 cm    LV mass 144.15 g    LA size 3.11 cm    RVDD 3.09 cm    Left Ventricle Relative Wall Thickness 0.33 cm    AV mean gradient 5 mmHg    AV valve area 2.51 cm2    AV Velocity Ratio 0.62     AV index (prosthetic) 0.64     MV valve area p 1/2 method 3.13 cm2    E/A ratio 0.90     Mean e' 0.11 m/s    E wave deceleration time 242.31 msec    IVRT 110.37 msec    Pulm vein S/D ratio 1.16     LVOT diameter 2.24 cm    LVOT area 3.9 cm2    LVOT peak braydon 0.89 m/s    LVOT peak VTI 18.60 cm    Ao peak braydon 1.44 m/s    Ao VTI 29.2 cm    RVOT peak braydon 0.61 m/s    RVOT peak VTI 15.9 cm    LVOT stroke volume 73.26 cm3    AV peak gradient 8 mmHg    PV mean gradient 1.07 mmHg    E/E' ratio 8.00 m/s    MV Peak E Braydon 0.84 m/s    TR Max Braydon 2.74 m/s    MV stenosis pressure 1/2 time 70.27 ms    MV Peak A Braydon 0.93 m/s    PV Peak S Braydon 0.44 m/s    PV Peak D Braydon 0.38 m/s    LV Systolic Volume 35.47 mL    LV Systolic Volume Index 19.6 mL/m2    LV Diastolic Volume 125.36 mL    LV Diastolic Volume Index 69.26 mL/m2    LA Volume Index 15.2 mL/m2    LV Mass Index 80 g/m2    RA Major Axis 3.64 cm    Left Atrium Minor Axis 4.09 cm    Left Atrium Major Axis 3.41 cm    Triscuspid Valve Regurgitation Peak Gradient 30 mmHg    LA Volume Index (Mod) 14.4 mL/m2    LA volume (mod) 26.07 cm3    RA Width 2.46 cm    Right Atrial Pressure (from IVC) 8 mmHg    EF 60 %    TV rest pulmonary artery pressure 38 mmHg    Narrative    · The left ventricle is normal in size with normal systolic  function.  · The estimated ejection fraction is 60%.  · Normal left ventricular diastolic function.  · Mild tricuspid regurgitation.  · Mild pulmonic regurgitation.  · Normal right ventricular size with normal right ventricular systolic   function.  · There is no pulmonary hypertension.

## 2023-03-14 NOTE — HPI
Patient is a 62 year old male with past medical history of HTN, tobacco use, COPD stage 4 (on 3-4 liters at home), lung lesion (recent lesions on left lung/PET scan pending 3/15) who presented to the ED with SOB and chest tightness x 1 week. His labs included Trop neg x 2, WBC 27.95, H/H 132.2/40.1, creatinine 0.6, BNP neg. Lipids stable. Chest x-ray without cardiac concerns, however  with bilateral interstitial and mild patchy/nodular alveolar infiltrates, left lower lobe cavitary lesion and possible bilateral small pulmonary nodules. EKG NSR, no ST changes on arrival but repeat noted to have ST elevation to inferior leads, in the setting of chest pain. Patient reports chest wall pain, tender to touch, improved with pain medicine. He reports SOB (with improvement since neb treatment, steroids). He denies palpitations, orthopnea, PND. Stress/echo 2015 with normal EF and neg for ischemia. Echo today reads normal EF, mild TR, AL. Cardiology consulted for chest pain.

## 2023-03-14 NOTE — HOSPITAL COURSE
Patient with intermittent chest pain.  ST abnormalities seen on EKG but troponin/ d dimer unremarkable.  Cardiology consulted. Patient still with significant bronchospasm on exam.       3/15: Patient still with chest tightness.  Increased solumedrol from 40mg q8 to 80mgq 6 today and CPT ordered.  ACS ruled out.      3/16 Tightness improving with increase in steroids and CPT.  Will continue current treatment plan.     3/17 Patient with significant improvement in chest tightness.  Will decrease IV steroids and plan for discharge tomorrow.      3/18  C/o some wheezing overnight. Still with intermittent SOB but feeling back to baseline. Cough productive of yellowish sputum.

## 2023-03-14 NOTE — SUBJECTIVE & OBJECTIVE
Past Medical History:   Diagnosis Date    Abnormal CT scan     Arthritis     COPD (chronic obstructive pulmonary disease)     Hypertension        Past Surgical History:   Procedure Laterality Date    BRONCHOALVEOLAR LAVAGE N/A 1/18/2023    Procedure: LAVAGE, BRONCHOALVEOLAR;  Surgeon: Driss Bartholomew MD;  Location: [facility];  Service: Pulmonary;  Laterality: N/A;    BRONCHOSCOPY WITH BIOPSY Bilateral 1/18/2023    Procedure: BRONCHOSCOPY, WITH BIOPSY;  Surgeon: Driss Bartholomew MD;  Location: [facility];  Service: Pulmonary;  Laterality: Bilateral;  site: lungs    BRONCHOSCOPY, WITH BRUSH BIOPSY Bilateral 1/18/2023    Procedure: BRONCHOSCOPY, WITH BRUSH BIOPSY;  Surgeon: Driss Bartholomew MD;  Location: [facility] OR;  Service: Pulmonary;  Laterality: Bilateral;    COLONOSCOPY      COLONOSCOPY N/A 8/21/2017    Procedure: COLONOSCOPY;  Surgeon: Jessica Fink MD;  Location: [facility];  Service: Endoscopy;  Laterality: N/A;       Review of patient's allergies indicates:  No Known Allergies    No current facility-administered medications on file prior to encounter.     Current Outpatient Medications on File Prior to Encounter   Medication Sig    albuterol (PROVENTIL HFA) 90 mcg/actuation inhaler Inhale 2 puffs into the lungs every 6 (six) hours as needed for Wheezing.    albuterol (PROVENTIL/VENTOLIN HFA) 90 mcg/actuation inhaler Inhale 1-2 puffs into the lungs every 6 (six) hours as needed for Wheezing. Rescue    albuterol-ipratropium (DUO-NEB) 2.5 mg-0.5 mg/3 mL nebulizer solution Take 3 mLs by nebulization every 6 (six) hours as needed for Wheezing. Rescue    amLODIPine (NORVASC) 10 MG tablet Take 1 tablet (10 mg total) by mouth once daily.    aspirin 81 MG Chew Take 1 tablet (81 mg total) by mouth once daily.    carvediloL (COREG) 25 MG tablet Take 1 tablet (25 mg total) by mouth 2 (two) times daily with meals.    DULoxetine (CYMBALTA) 20 MG capsule Take 1 capsule (20 mg total) by mouth once  daily.    gabapentin (NEURONTIN) 300 MG capsule Take 1 capsule (300 mg total) by mouth 3 (three) times daily.    hydroCHLOROthiazide (HYDRODIURIL) 12.5 MG Tab Take 1 tablet (12.5 mg total) by mouth once daily.    LIPITOR 40 mg tablet Take 1 tablet (40 mg total) by mouth once daily.    lisinopriL (PRINIVIL,ZESTRIL) 20 MG tablet Take 2 tablets (40 mg total) by mouth once daily.    nicotine (NICODERM CQ) 7 mg/24 hr Place 1 patch onto the skin once daily.    traMADoL (ULTRAM) 50 mg tablet TAKE 1 TABLET BY MOUTH EVERY 8 HOURS AS NEEDED FOR PAIN    umeclidinium-vilanteroL (ANORO ELLIPTA) 62.5-25 mcg/actuation DsDv Inhale 1 puff into the lungs once daily. Controller    PULMO-AIDE COMPRESSOR Eva use as directed     Family History       Problem Relation (Age of Onset)    Emphysema Mother    Heart failure Father          Tobacco Use    Smoking status: Every Day     Packs/day: 0.10     Years: 37.00     Pack years: 3.70     Types: Cigarettes     Start date: 4/8/1979    Smokeless tobacco: Never    Tobacco comments:     4-5 daily 1/9/23   Substance and Sexual Activity    Alcohol use: Yes     Alcohol/week: 0.0 standard drinks     Comment: once a week    Drug use: No    Sexual activity: Not Currently     Review of Systems   Constitutional:  Positive for fatigue. Negative for chills and fever.   Respiratory:  Positive for cough, chest tightness and shortness of breath.    Cardiovascular:  Positive for chest pain (intermittent spasm). Negative for palpitations and leg swelling.   Gastrointestinal:  Negative for constipation, diarrhea, nausea and vomiting.   Genitourinary:  Negative for difficulty urinating and dysuria.   Musculoskeletal:  Positive for arthralgias. Negative for gait problem.   Skin:  Negative for pallor and rash.   Neurological:  Negative for weakness and numbness.   Psychiatric/Behavioral:  Negative for agitation and confusion.    Objective:     Vital Signs (Most Recent):  Temp: 98.2 °F (36.8 °C) (03/14/23  1104)  Pulse: 72 (03/14/23 1200)  Resp: 19 (03/14/23 1145)  BP: 113/63 (03/14/23 1104)  SpO2: (!) 94 % (03/14/23 1145)   Vital Signs (24h Range):  Temp:  [97.7 °F (36.5 °C)-98.4 °F (36.9 °C)] 98.2 °F (36.8 °C)  Pulse:  [56-80] 72  Resp:  [16-24] 19  SpO2:  [93 %-99 %] 94 %  BP: (113-134)/(54-72) 113/63     Weight: 64.5 kg (142 lb 3.2 oz)  Body mass index is 20.4 kg/m².    Physical Exam  Constitutional:       Appearance: Normal appearance.   Cardiovascular:      Rate and Rhythm: Normal rate and regular rhythm.   Pulmonary:      Effort: No respiratory distress.      Comments: Bronchospastic     Abdominal:      General: Abdomen is flat.      Palpations: Abdomen is soft.   Neurological:      Mental Status: He is alert and oriented to person, place, and time. Mental status is at baseline.   Psychiatric:         Mood and Affect: Mood normal.         Behavior: Behavior normal.           Significant Labs: All pertinent labs within the past 24 hours have been reviewed.  A1C: No results for input(s): HGBA1C in the last 4320 hours.  ABGs: No results for input(s): PH, PCO2, HCO3, POCSATURATED, BE, TOTALHB, COHB, METHB, O2HB, POCFIO2, PO2 in the last 48 hours.  Bilirubin:   Recent Labs   Lab 03/12/23 2019   BILITOT 0.6       BMP:   Recent Labs   Lab 03/14/23  0552   *      K 4.6   CL 96   CO2 30*   BUN 9   CREATININE 0.6   CALCIUM 9.4       CBC:   Recent Labs   Lab 03/12/23 2019 03/13/23  0810 03/14/23  0552   WBC 28.13* 16.92* 27.95*   HGB 14.4 14.4 13.2*   HCT 42.7 43.7 40.1    342 366       CMP:   Recent Labs   Lab 03/12/23 2019 03/13/23  0810 03/14/23  0552   * 137 137   K 4.5 4.7 4.6   CL 95 97 96   CO2 27 30* 30*   * 126* 131*   BUN 5* 7* 9   CREATININE 0.6 0.6 0.6   CALCIUM 9.6 9.9 9.4   PROT 6.9  --   --    ALBUMIN 3.3*  --   --    BILITOT 0.6  --   --    ALKPHOS 93  --   --    AST 12  --   --    ALT 9*  --   --    ANIONGAP 10 10 11       Cardiac Markers:   Recent Labs   Lab  03/12/23  2019   BNP 70       Coagulation: No results for input(s): PT, INR, APTT in the last 48 hours.  Lactic Acid:   Recent Labs   Lab 03/12/23  2128   LACTATE 1.0       Lipase: No results for input(s): LIPASE in the last 48 hours.  Lipid Panel: No results for input(s): CHOL, HDL, LDLCALC, TRIG, CHOLHDL in the last 48 hours.  Magnesium: No results for input(s): MG in the last 48 hours.  Troponin:   Recent Labs   Lab 03/12/23 2019 03/14/23  0552   TROPONINI <0.006 <0.006       TSH: No results for input(s): TSH in the last 4320 hours.  Urine Culture: No results for input(s): LABURIN in the last 48 hours.  Urine Studies: No results for input(s): COLORU, APPEARANCEUA, PHUR, SPECGRAV, PROTEINUA, GLUCUA, KETONESU, BILIRUBINUA, OCCULTUA, NITRITE, UROBILINOGEN, LEUKOCYTESUR, RBCUA, WBCUA, BACTERIA, SQUAMEPITHEL, HYALINECASTS in the last 48 hours.    Invalid input(s): WRIGHTSUR    Significant Imaging: I have reviewed all pertinent imaging results/findings within the past 24 hours.    CXR   Cardiac silhouette is within normal limits.     Bilateral interstitial and mild patchy/nodular alveolar infiltrates may be associated with pneumonitis.  This is improved at the left lung base though slightly increased in the right lung.  Follow-up recommended.     Left lower lobe cavitary lesion is better visualized on the prior CT.  Possible underlying bilateral small pulmonary nodules.  See prior CT report.     No effusion or pneumothorax.  No lobar evidence of lobar consolidation.  No acute osseous abnormality.     Emphysematous changes.     EKG  With ST change in II, III, AVF and V3

## 2023-03-14 NOTE — ASSESSMENT & PLAN NOTE
Intermittent spasm pain with movement and rest   ST abnormalities in leads II, III, AVF and v3  Troponin and d dimer wnl  Cardiology consulted

## 2023-03-14 NOTE — CONSULTS
[facility] - Med Surg ([facility])  Cardiology  Consult Note    Patient Name: Brando Culp Jr.  MRN: [id number]  Admission Date: 3/12/2023  Hospital Length of Stay: 2 days  Code Status: Full Code   Attending Provider: Deniz Sheppard MD   Consulting Provider: Elinor Cisneros NP  Primary Care Physician: Dioni Matson MD  Principal Problem:COPD exacerbation    Patient information was obtained from patient and ER records.     Inpatient consult to [facility]iology[facility]  Consult performed by: Elinor Cisneros NP  Consult ordered by: Clotilde Stallworth PA-C        Subjective:     Chief Complaint:  Chest pain      HPI:   Patient is a 62 year old male with past medical history of HTN, tobacco use, COPD stage 4 (on 3-4 liters at home), lung lesion (recent lesions on left lung/PET scan pending 3/15) who presented to the ED with SOB and chest tightness x 1 week. His labs included Trop neg x 2, WBC 27.95, H/H 132.2/40.1, creatinine 0.6, BNP neg. Lipids stable. Chest x-ray without cardiac concerns, however  with bilateral interstitial and mild patchy/nodular alveolar infiltrates, left lower lobe cavitary lesion and possible bilateral small pulmonary nodules. EKG NSR, no ST changes on arrival but repeat noted to have ST elevation to inferior leads, in the setting of chest pain. Patient reports chest wall pain, tender to touch, improved with pain medicine. He reports SOB (with improvement since neb treatment, steroids). He denies palpitations, orthopnea, PND. Stress/echo 2015 with normal EF and neg for ischemia. Echo today reads normal EF, mild TR, SC. Cardiology consulted for chest pain.       Past Medical History:   Diagnosis Date    Abnormal CT scan     Arthritis     COPD (chronic obstructive pulmonary disease)     Hypertension        Past Surgical History:   Procedure Laterality Date    BRONCHOALVEOLAR LAVAGE N/A 1/18/2023    Procedure: LAVAGE, BRONCHOALVEOLAR;  Surgeon: Driss Bartholomew MD;  Location: [facility];   Service: Pulmonary;  Laterality: N/A;    BRONCHOSCOPY WITH BIOPSY Bilateral 1/18/2023    Procedure: BRONCHOSCOPY, WITH BIOPSY;  Surgeon: Driss Bartholomew MD;  Location: [facility];  Service: Pulmonary;  Laterality: Bilateral;  site: lungs    BRONCHOSCOPY, WITH BRUSH BIOPSY Bilateral 1/18/2023    Procedure: BRONCHOSCOPY, WITH BRUSH BIOPSY;  Surgeon: Driss Bartholomew MD;  Location: [facility] OR;  Service: Pulmonary;  Laterality: Bilateral;    COLONOSCOPY      COLONOSCOPY N/A 8/21/2017    Procedure: COLONOSCOPY;  Surgeon: Jessica Fink MD;  [facility];  Service: Endoscopy;  Laterality: N/A;       Review of patient's allergies indicates:  No Known Allergies    No current facility-administered medications on file prior to encounter.     Current Outpatient Medications on File Prior to Encounter   Medication Sig    albuterol (PROVENTIL HFA) 90 mcg/actuation inhaler Inhale 2 puffs into the lungs every 6 (six) hours as needed for Wheezing.    albuterol (PROVENTIL/VENTOLIN HFA) 90 mcg/actuation inhaler Inhale 1-2 puffs into the lungs every 6 (six) hours as needed for Wheezing. Rescue    albuterol-ipratropium (DUO-NEB) 2.5 mg-0.5 mg/3 mL nebulizer solution Take 3 mLs by nebulization every 6 (six) hours as needed for Wheezing. Rescue    amLODIPine (NORVASC) 10 MG tablet Take 1 tablet (10 mg total) by mouth once daily.    aspirin 81 MG Chew Take 1 tablet (81 mg total) by mouth once daily.    carvediloL (COREG) 25 MG tablet Take 1 tablet (25 mg total) by mouth 2 (two) times daily with meals.    DULoxetine (CYMBALTA) 20 MG capsule Take 1 capsule (20 mg total) by mouth once daily.    gabapentin (NEURONTIN) 300 MG capsule Take 1 capsule (300 mg total) by mouth 3 (three) times daily.    hydroCHLOROthiazide (HYDRODIURIL) 12.5 MG Tab Take 1 tablet (12.5 mg total) by mouth once daily.    LIPITOR 40 mg tablet Take 1 tablet (40 mg total) by mouth once daily.    lisinopriL (PRINIVIL,ZESTRIL) 20 MG  tablet Take 2 tablets (40 mg total) by mouth once daily.    nicotine (NICODERM CQ) 7 mg/24 hr Place 1 patch onto the skin once daily.    traMADoL (ULTRAM) 50 mg tablet TAKE 1 TABLET BY MOUTH EVERY 8 HOURS AS NEEDED FOR PAIN    umeclidinium-vilanteroL (ANORO ELLIPTA) 62.5-25 mcg/actuation DsDv Inhale 1 puff into the lungs once daily. Controller    PULMO-AIDE COMPRESSOR Eva use as directed     Family History       Problem Relation (Age of Onset)    Emphysema Mother    Heart failure Father          Tobacco Use    Smoking status: Every Day     Packs/day: 0.10     Years: 37.00     Pack years: 3.70     Types: Cigarettes     Start date: 4/8/1979    Smokeless tobacco: Never    Tobacco comments:     4-5 daily 1/9/23   Substance and Sexual Activity    Alcohol use: Yes     Alcohol/week: 0.0 standard drinks     Comment: once a week    Drug use: No    Sexual activity: Not Currently     Review of Systems   Constitutional: Negative.   Cardiovascular:  Positive for chest pain and dyspnea on exertion. Negative for claudication, cyanosis, irregular heartbeat, leg swelling, near-syncope, orthopnea, palpitations, paroxysmal nocturnal dyspnea and syncope.   Respiratory:  Positive for cough, shortness of breath and wheezing.    Skin: Negative.    Musculoskeletal: Negative.    Neurological:  Positive for weakness.   Objective:     Vital Signs (Most Recent):  Temp: 98.2 °F (36.8 °C) (03/14/23 1104)  Pulse: 72 (03/14/23 1200)  Resp: 19 (03/14/23 1145)  BP: 113/63 (03/14/23 1104)  SpO2: (!) 94 % (03/14/23 1145)   Vital Signs (24h Range):  Temp:  [97.7 °F (36.5 °C)-98.4 °F (36.9 °C)] 98.2 °F (36.8 °C)  Pulse:  [56-80] 72  Resp:  [16-24] 19  SpO2:  [93 %-99 %] 94 %  BP: (113-134)/(54-72) 113/63     Weight: 64.5 kg (142 lb 3.2 oz)  Body mass index is 20.4 kg/m².    SpO2: (!) 94 %         Intake/Output Summary (Last 24 hours) at 3/14/2023 1327  Last data filed at 3/14/2023 0457  Gross per 24 hour   Intake 391.1 ml   Output 2075 ml    Net -1683.9 ml       Lines/Drains/Airways       Peripheral Intravenous Line  Duration                  Peripheral IV - Single Lumen 03/12/23 2025 18 G Anterior;Left Forearm 1 day         Peripheral IV - Single Lumen 03/12/23 2045 20 G Anterior;Left Hand 1 day                    Physical Exam  HENT:      Head: Normocephalic.   Cardiovascular:      Rate and Rhythm: Normal rate and regular rhythm.      Heart sounds: Normal heart sounds.   Pulmonary:      Effort: Pulmonary effort is normal.   Musculoskeletal:         General: Normal range of motion.   Skin:     General: Skin is warm and dry.   Neurological:      Mental Status: He is alert and oriented to person, place, and time.   Psychiatric:         Mood and Affect: Mood normal.       Significant Labs: BMP:   Recent Labs   Lab 03/12/23 2019 03/13/23  0810 03/14/23  0552   * 126* 131*   * 137 137   K 4.5 4.7 4.6   CL 95 97 96   CO2 27 30* 30*   BUN 5* 7* 9   CREATININE 0.6 0.6 0.6   CALCIUM 9.6 9.9 9.4   , CBC   Recent Labs   Lab 03/12/23 2019 03/13/23  0810 03/14/23  0552   WBC 28.13* 16.92* 27.95*   HGB 14.4 14.4 13.2*   HCT 42.7 43.7 40.1    342 366   , Lipid Panel No results for input(s): CHOL, HDL, LDLCALC, TRIG, CHOLHDL in the last 48 hours., and Troponin   Recent Labs   Lab 03/12/23 2019 03/14/23  0552   TROPONINI <0.006 <0.006       Significant Imaging: Echocardiogram: Transthoracic echo (TTE) complete ([facility] Only):   Results for orders placed or performed during the hospital encounter of 03/12/23   Echo   Result Value Ref Range    BSA 1.78 m2    TDI SEPTAL 0.11 m/s    LV LATERAL E/E' RATIO 8.40 m/s    LV SEPTAL E/E' RATIO 7.64 m/s    LA WIDTH 2.80 cm    IVC diameter 18 cm    Left Ventricular Outflow Tract Mean Velocity 0.64 cm/s    Left Ventricular Outflow Tract Mean Gradient 1.81 mmHg    Pulmonary Valve Mean Velocity 0.68 m/s    TDI LATERAL 0.10 m/s    PV PEAK VELOCITY 0.87 cm/s    LVIDd 5.13 3.5 - 6.0 cm    IVS 0.78 0.6 - 1.1 cm     Posterior Wall 0.84 0.6 - 1.1 cm    LVIDs 3.02 2.1 - 4.0 cm    FS 41 28 - 44 %    LA volume 27.53 cm3    Sinus 3.20 cm    STJ 2.88 cm    Ascending aorta 2.82 cm    LV mass 144.15 g    LA size 3.11 cm    RVDD 3.09 cm    Left Ventricle Relative Wall Thickness 0.33 cm    AV mean gradient 5 mmHg    AV valve area 2.51 cm2    AV Velocity Ratio 0.62     AV index (prosthetic) 0.64     MV valve area p 1/2 method 3.13 cm2    E/A ratio 0.90     Mean e' 0.11 m/s    E wave deceleration time 242.31 msec    IVRT 110.37 msec    Pulm vein S/D ratio 1.16     LVOT diameter 2.24 cm    LVOT area 3.9 cm2    LVOT peak braydon 0.89 m/s    LVOT peak VTI 18.60 cm    Ao peak braydon 1.44 m/s    Ao VTI 29.2 cm    RVOT peak braydon 0.61 m/s    RVOT peak VTI 15.9 cm    LVOT stroke volume 73.26 cm3    AV peak gradient 8 mmHg    PV mean gradient 1.07 mmHg    E/E' ratio 8.00 m/s    MV Peak E Braydon 0.84 m/s    TR Max Braydon 2.74 m/s    MV stenosis pressure 1/2 time 70.27 ms    MV Peak A Braydon 0.93 m/s    PV Peak S Braydon 0.44 m/s    PV Peak D Braydon 0.38 m/s    LV Systolic Volume 35.47 mL    LV Systolic Volume Index 19.6 mL/m2    LV Diastolic Volume 125.36 mL    LV Diastolic Volume Index 69.26 mL/m2    LA Volume Index 15.2 mL/m2    LV Mass Index 80 g/m2    RA Major Axis 3.64 cm    Left Atrium Minor Axis 4.09 cm    Left Atrium Major Axis 3.41 cm    Triscuspid Valve Regurgitation Peak Gradient 30 mmHg    LA Volume Index (Mod) 14.4 mL/m2    LA volume (mod) 26.07 cm3    RA Width 2.46 cm    Right Atrial Pressure (from IVC) 8 mmHg    EF 60 %    TV rest pulmonary artery pressure 38 mmHg    Narrative    · The left ventricle is normal in size with normal systolic function.  · The estimated ejection fraction is 60%.  · Normal left ventricular diastolic function.  · Mild tricuspid regurgitation.  · Mild pulmonic regurgitation.  · Normal right ventricular size with normal right ventricular systolic   function.  · There is no pulmonary hypertension.        Assessment and Plan:     *  COPD exacerbation  -per primary team    Leukocytosis  - WBC 27.95  - treatment per primary team for pneumonia    Essential hypertension  - stable  - continue current therapy    Pain in the Chest  - admitted for COPD exacerbation, pneumonia  - WBC 27.95  - initial EKG NSR, no ST changes on admission  - repeat EKG NSR with concerns for ST elevation to inferior leads; re-read by Dr. Wilson as nonspecific T-wave abnormality  - trop neg x 2   - echo normal EF and mild TR, KS  - had stress/ echo 2015 also with normal EF and neg for myocardial ischemia  - is not followed by cards outpatient and does have calcified aortic and coronary lesions on imaging studies   - continue current regimen    I do not suspect ACS. Patient presents with muscular chest wall pain due to signifcant coughing. Patient reports improvement with pain relievers. Recommend outpatient appointment to establish care.           VTE Risk Mitigation (From admission, onward)         Ordered     IP VTE LOW RISK PATIENT  Once         03/13/23 0014     Place CHIKI hose  Until discontinued         03/13/23 0014                Thank you for your consult. I will sign off. Please contact us if you have any additional questions.    Elinor Cisneros, NP  Cardiology   [facility] - Med Surg (3rd Fl)

## 2023-03-15 NOTE — SUBJECTIVE & OBJECTIVE
Past Medical History:   Diagnosis Date    Abnormal CT scan     Arthritis     COPD (chronic obstructive pulmonary disease)     Hypertension        Past Surgical History:   Procedure Laterality Date    BRONCHOALVEOLAR LAVAGE N/A 1/18/2023    Procedure: LAVAGE, BRONCHOALVEOLAR;  Surgeon: Driss Bartholomew MD;  Location: [facility];  Service: Pulmonary;  Laterality: N/A;    BRONCHOSCOPY WITH BIOPSY Bilateral 1/18/2023    Procedure: BRONCHOSCOPY, WITH BIOPSY;  Surgeon: Driss Bartholomew MD;  Location: [facility];  Service: Pulmonary;  Laterality: Bilateral;  site: lungs    BRONCHOSCOPY, WITH BRUSH BIOPSY Bilateral 1/18/2023    Procedure: BRONCHOSCOPY, WITH BRUSH BIOPSY;  Surgeon: Driss Bartholomew MD;  [facility] OR;  Service: Pulmonary;  Laterality: Bilateral;    COLONOSCOPY      COLONOSCOPY N/A 8/21/2017    Procedure: COLONOSCOPY;  Surgeon: Jessica Fink MD;  Location: [facility];  Service: Endoscopy;  Laterality: N/A;       Review of patient's allergies indicates:  No Known Allergies    No current facility-administered medications on file prior to encounter.     Current Outpatient Medications on File Prior to Encounter   Medication Sig    albuterol (PROVENTIL HFA) 90 mcg/actuation inhaler Inhale 2 puffs into the lungs every 6 (six) hours as needed for Wheezing.    albuterol (PROVENTIL/VENTOLIN HFA) 90 mcg/actuation inhaler Inhale 1-2 puffs into the lungs every 6 (six) hours as needed for Wheezing. Rescue    albuterol-ipratropium (DUO-NEB) 2.5 mg-0.5 mg/3 mL nebulizer solution Take 3 mLs by nebulization every 6 (six) hours as needed for Wheezing. Rescue    amLODIPine (NORVASC) 10 MG tablet Take 1 tablet (10 mg total) by mouth once daily.    aspirin 81 MG Chew Take 1 tablet (81 mg total) by mouth once daily.    carvediloL (COREG) 25 MG tablet Take 1 tablet (25 mg total) by mouth 2 (two) times daily with meals.    DULoxetine (CYMBALTA) 20 MG capsule Take 1 capsule (20 mg total) by mouth once  daily.    gabapentin (NEURONTIN) 300 MG capsule Take 1 capsule (300 mg total) by mouth 3 (three) times daily.    hydroCHLOROthiazide (HYDRODIURIL) 12.5 MG Tab Take 1 tablet (12.5 mg total) by mouth once daily.    LIPITOR 40 mg tablet Take 1 tablet (40 mg total) by mouth once daily.    lisinopriL (PRINIVIL,ZESTRIL) 20 MG tablet Take 2 tablets (40 mg total) by mouth once daily.    nicotine (NICODERM CQ) 7 mg/24 hr Place 1 patch onto the skin once daily.    traMADoL (ULTRAM) 50 mg tablet TAKE 1 TABLET BY MOUTH EVERY 8 HOURS AS NEEDED FOR PAIN    umeclidinium-vilanteroL (ANORO ELLIPTA) 62.5-25 mcg/actuation DsDv Inhale 1 puff into the lungs once daily. Controller    PULMO-AIDE COMPRESSOR Eva use as directed     Family History       Problem Relation (Age of Onset)    Emphysema Mother    Heart failure Father          Tobacco Use    Smoking status: Every Day     Packs/day: 0.10     Years: 37.00     Pack years: 3.70     Types: Cigarettes     Start date: 4/8/1979    Smokeless tobacco: Never    Tobacco comments:     4-5 daily 1/9/23   Substance and Sexual Activity    Alcohol use: Yes     Alcohol/week: 0.0 standard drinks     Comment: once a week    Drug use: No    Sexual activity: Not Currently     Review of Systems   Constitutional:  Positive for fatigue. Negative for chills and fever.   Respiratory:  Positive for cough, chest tightness and shortness of breath.    Cardiovascular:  Positive for chest pain (intermittent spasm). Negative for palpitations and leg swelling.   Gastrointestinal:  Negative for constipation, diarrhea, nausea and vomiting.   Genitourinary:  Negative for difficulty urinating and dysuria.   Musculoskeletal:  Positive for arthralgias. Negative for gait problem.   Skin:  Negative for pallor and rash.   Neurological:  Negative for weakness and numbness.   Psychiatric/Behavioral:  Negative for agitation and confusion.    Objective:     Vital Signs (Most Recent):  Temp: 98.6 °F (37 °C) (03/15/23  1130)  Pulse: 87 (03/15/23 1200)  Resp: 20 (03/15/23 1139)  BP: (!) 107/59 (03/15/23 1134)  SpO2: (!) 94 % (03/15/23 1139)   Vital Signs (24h Range):  Temp:  [97.1 °F (36.2 °C)-98.6 °F (37 °C)] 98.6 °F (37 °C)  Pulse:  [66-87] 87  Resp:  [16-22] 20  SpO2:  [93 %-98 %] 94 %  BP: (107-134)/(59-79) 107/59     Weight: 66.1 kg (145 lb 11.6 oz)  Body mass index is 20.91 kg/m².    Physical Exam  Constitutional:       Appearance: Normal appearance.   Cardiovascular:      Rate and Rhythm: Normal rate and regular rhythm.   Pulmonary:      Effort: No respiratory distress.      Comments: Bronchospastic with mild improvement    Abdominal:      General: Abdomen is flat.      Palpations: Abdomen is soft.   Neurological:      Mental Status: He is alert and oriented to person, place, and time. Mental status is at baseline.   Psychiatric:         Mood and Affect: Mood normal.         Behavior: Behavior normal.           Significant Labs: All pertinent labs within the past 24 hours have been reviewed.  A1C: No results for input(s): HGBA1C in the last 4320 hours.  ABGs: No results for input(s): PH, PCO2, HCO3, POCSATURATED, BE, TOTALHB, COHB, METHB, O2HB, POCFIO2, PO2 in the last 48 hours.  Bilirubin:   Recent Labs   Lab 03/12/23  2019   BILITOT 0.6       BMP:   Recent Labs   Lab 03/15/23  0626         K 4.1   CL 98   CO2 31*   BUN 14   CREATININE 0.6   CALCIUM 9.1       CBC:   Recent Labs   Lab 03/14/23  0552 03/15/23  0626   WBC 27.95* 29.74*   HGB 13.2* 13.6*   HCT 40.1 42.0    397       CMP:   Recent Labs   Lab 03/14/23  0552 03/15/23  0626    140   K 4.6 4.1   CL 96 98   CO2 30* 31*   * 108   BUN 9 14   CREATININE 0.6 0.6   CALCIUM 9.4 9.1   ANIONGAP 11 11       Cardiac Markers:   No results for input(s): CKMB, MYOGLOBIN, BNP, TROPISTAT in the last 48 hours.    Coagulation: No results for input(s): PT, INR, APTT in the last 48 hours.  Lactic Acid:   No results for input(s): LACTATE in the last 48  hours.    Lipase: No results for input(s): LIPASE in the last 48 hours.  Lipid Panel: No results for input(s): CHOL, HDL, LDLCALC, TRIG, CHOLHDL in the last 48 hours.  Magnesium: No results for input(s): MG in the last 48 hours.  Troponin:   Recent Labs   Lab 03/14/23  0552   TROPONINI <0.006       TSH: No results for input(s): TSH in the last 4320 hours.  Urine Culture: No results for input(s): LABURIN in the last 48 hours.  Urine Studies: No results for input(s): COLORU, APPEARANCEUA, PHUR, SPECGRAV, PROTEINUA, GLUCUA, KETONESU, BILIRUBINUA, OCCULTUA, NITRITE, UROBILINOGEN, LEUKOCYTESUR, RBCUA, WBCUA, BACTERIA, SQUAMEPITHEL, HYALINECASTS in the last 48 hours.    Invalid input(s): WRIGHTSUR    Significant Imaging: I have reviewed all pertinent imaging results/findings within the past 24 hours.    CXR   Cardiac silhouette is within normal limits.     Bilateral interstitial and mild patchy/nodular alveolar infiltrates may be associated with pneumonitis.  This is improved at the left lung base though slightly increased in the right lung.  Follow-up recommended.     Left lower lobe cavitary lesion is better visualized on the prior CT.  Possible underlying bilateral small pulmonary nodules.  See prior CT report.     No effusion or pneumothorax.  No lobar evidence of lobar consolidation.  No acute osseous abnormality.     Emphysematous changes.     EKG  With ST change in II, III, AVF and V3

## 2023-03-15 NOTE — ASSESSMENT & PLAN NOTE
Treating for COPD pneumonia and currently on high dose steroids   U/A negative for UTI   Blood culture NGTD

## 2023-03-15 NOTE — ASSESSMENT & PLAN NOTE
Reason for admission  CXR: lower lobe cavitary lesion.  No lobar consolidation.  Bilateral interstitial patchy infiltrates. Left worse than right.  Emphysematous changes.    Scheduled nebulizer tx, IV levofloxacin and increased IV solumedrol 80mg q6.  Supportive care.

## 2023-03-15 NOTE — PLAN OF CARE
Problem: Adult Inpatient Plan of Care  Goal: Plan of Care Review  Outcome: Ongoing, Progressing  Goal: Optimal Comfort and Wellbeing  Outcome: Ongoing, Progressing     Problem: Gas Exchange Impaired  Goal: Optimal Gas Exchange  Outcome: Ongoing, Progressing     Problem: Pain Acute  Goal: Acceptable Pain Control and Functional Ability  Outcome: Ongoing, Progressing

## 2023-03-15 NOTE — ASSESSMENT & PLAN NOTE
Intermittent spasm pain with movement and rest   ST abnormalities in leads II, III, AVF and v3  Troponin and d dimer wnl  Cardiology consulted no ACS

## 2023-03-15 NOTE — PLAN OF CARE
Problem: Adult Inpatient Plan of Care  Goal: Plan of Care Review  Outcome: Ongoing, Progressing  Flowsheets (Taken 3/15/2023 8387)  Plan of Care Reviewed With: patient  Goal: Optimal Comfort and Wellbeing  Outcome: Ongoing, Progressing  Goal: Readiness for Transition of Care  Outcome: Ongoing, Progressing     Problem: Pain Acute  Goal: Acceptable Pain Control and Functional Ability  Outcome: Ongoing, Progressing     Problem: Gas Exchange Impaired  Goal: Optimal Gas Exchange  Outcome: Ongoing, Progressing

## 2023-03-15 NOTE — PROGRESS NOTES
[facility] ([address] [place] [address] Medicine  Progress Note    Patient Name: Brando Culp Jr.  MRN: [id number]  Patient Class: IP- Inpatient   Admission Date: 3/12/2023  Length of Stay: 3 days  Attending Physician: Deniz Sheppard MD  Primary Care Provider: Dioni Matson MD        Subjective:     Principal Problem:COPD exacerbation        HPI:  Patient is a 62 year old male with medical history of HTN, COPD stage 4 (on 3-4 liters at home), lung lesion (recent lesions on left lung/PET scan pending 3/15) who presented to the ED with chest tightness for one week.  Patient has had increased cough and sputum production.  His sputum has changed from clear to green.  He denies fever, CP, nausea and vomiting.      Admitted for COPD exacerbation.  Scheduled nebulizer, IV steroids and IV abx.        Overview/Hospital Course:  Patient with intermittent chest pain.  ST abnormalities seen on EKG but troponin/ d dimer unremarkable.  Cardiology consulted. Patient still with significant bronchospasm on exam.       3/15: Patient still with chest tightness.  Increased solumedrol from 40mg q8 to 80mgq 6 today and CPT ordered.  ACS ruled out.        Past Medical History:   Diagnosis Date    Abnormal CT scan     Arthritis     COPD (chronic obstructive pulmonary disease)     Hypertension        Past Surgical History:   Procedure Laterality Date    BRONCHOALVEOLAR LAVAGE N/A 1/18/2023    Procedure: LAVAGE, BRONCHOALVEOLAR;  Surgeon: Driss Bartholomew MD;  Location: [facility];  Service: Pulmonary;  Laterality: N/A;    BRONCHOSCOPY WITH BIOPSY Bilateral 1/18/2023    Procedure: BRONCHOSCOPY, WITH BIOPSY;  Surgeon: Driss Bartholomew MD;  Location: [facility];  Service: Pulmonary;  Laterality: Bilateral;  site: lungs    BRONCHOSCOPY, WITH BRUSH BIOPSY Bilateral 1/18/2023    Procedure: BRONCHOSCOPY, WITH BRUSH BIOPSY;  Surgeon: Driss Bartholomew MD;  Location: [facility];  Service: Pulmonary;  Laterality: Bilateral;     COLONOSCOPY      COLONOSCOPY N/A 8/21/2017    Procedure: COLONOSCOPY;  Surgeon: Jessica Fink MD;  Location: [facility];  Service: Endoscopy;  Laterality: N/A;       Review of patient's allergies indicates:  No Known Allergies    No current facility-administered medications on file prior to encounter.     Current Outpatient Medications on File Prior to Encounter   Medication Sig    albuterol (PROVENTIL HFA) 90 mcg/actuation inhaler Inhale 2 puffs into the lungs every 6 (six) hours as needed for Wheezing.    albuterol (PROVENTIL/VENTOLIN HFA) 90 mcg/actuation inhaler Inhale 1-2 puffs into the lungs every 6 (six) hours as needed for Wheezing. Rescue    albuterol-ipratropium (DUO-NEB) 2.5 mg-0.5 mg/3 mL nebulizer solution Take 3 mLs by nebulization every 6 (six) hours as needed for Wheezing. Rescue    amLODIPine (NORVASC) 10 MG tablet Take 1 tablet (10 mg total) by mouth once daily.    aspirin 81 MG Chew Take 1 tablet (81 mg total) by mouth once daily.    carvediloL (COREG) 25 MG tablet Take 1 tablet (25 mg total) by mouth 2 (two) times daily with meals.    DULoxetine (CYMBALTA) 20 MG capsule Take 1 capsule (20 mg total) by mouth once daily.    gabapentin (NEURONTIN) 300 MG capsule Take 1 capsule (300 mg total) by mouth 3 (three) times daily.    hydroCHLOROthiazide (HYDRODIURIL) 12.5 MG Tab Take 1 tablet (12.5 mg total) by mouth once daily.    LIPITOR 40 mg tablet Take 1 tablet (40 mg total) by mouth once daily.    lisinopriL (PRINIVIL,ZESTRIL) 20 MG tablet Take 2 tablets (40 mg total) by mouth once daily.    nicotine (NICODERM CQ) 7 mg/24 hr Place 1 patch onto the skin once daily.    traMADoL (ULTRAM) 50 mg tablet TAKE 1 TABLET BY MOUTH EVERY 8 HOURS AS NEEDED FOR PAIN    umeclidinium-vilanteroL (ANORO ELLIPTA) 62.5-25 mcg/actuation DsDv Inhale 1 puff into the lungs once daily. Controller    PULMO-AIDE COMPRESSOR Eva use as directed     Family History       Problem Relation (Age of Onset)     Emphysema Mother    Heart failure Father          Tobacco Use    Smoking status: Every Day     Packs/day: 0.10     Years: 37.00     Pack years: 3.70     Types: Cigarettes     Start date: 4/8/1979    Smokeless tobacco: Never    Tobacco comments:     4-5 daily 1/9/23   Substance and Sexual Activity    Alcohol use: Yes     Alcohol/week: 0.0 standard drinks     Comment: once a week    Drug use: No    Sexual activity: Not Currently     Review of Systems   Constitutional:  Positive for fatigue. Negative for chills and fever.   Respiratory:  Positive for cough, chest tightness and shortness of breath.    Cardiovascular:  Positive for chest pain (intermittent spasm). Negative for palpitations and leg swelling.   Gastrointestinal:  Negative for constipation, diarrhea, nausea and vomiting.   Genitourinary:  Negative for difficulty urinating and dysuria.   Musculoskeletal:  Positive for arthralgias. Negative for gait problem.   Skin:  Negative for pallor and rash.   Neurological:  Negative for weakness and numbness.   Psychiatric/Behavioral:  Negative for agitation and confusion.    Objective:     Vital Signs (Most Recent):  Temp: 98.6 °F (37 °C) (03/15/23 1130)  Pulse: 87 (03/15/23 1200)  Resp: 20 (03/15/23 1139)  BP: (!) 107/59 (03/15/23 1134)  SpO2: (!) 94 % (03/15/23 1139)   Vital Signs (24h Range):  Temp:  [97.1 °F (36.2 °C)-98.6 °F (37 °C)] 98.6 °F (37 °C)  Pulse:  [66-87] 87  Resp:  [16-22] 20  SpO2:  [93 %-98 %] 94 %  BP: (107-134)/(59-79) 107/59     Weight: 66.1 kg (145 lb 11.6 oz)  Body mass index is 20.91 kg/m².    Physical Exam  Constitutional:       Appearance: Normal appearance.   Cardiovascular:      Rate and Rhythm: Normal rate and regular rhythm.   Pulmonary:      Effort: No respiratory distress.      Comments: Bronchospastic with mild improvement    Abdominal:      General: Abdomen is flat.      Palpations: Abdomen is soft.   Neurological:      Mental Status: He is alert and oriented to person,  place, and time. Mental status is at baseline.   Psychiatric:         Mood and Affect: Mood normal.         Behavior: Behavior normal.           Significant Labs: All pertinent labs within the past 24 hours have been reviewed.  A1C: No results for input(s): HGBA1C in the last 4320 hours.  ABGs: No results for input(s): PH, PCO2, HCO3, POCSATURATED, BE, TOTALHB, COHB, METHB, O2HB, POCFIO2, PO2 in the last 48 hours.  Bilirubin:   Recent Labs   Lab 03/12/23  2019   BILITOT 0.6       BMP:   Recent Labs   Lab 03/15/23  0626         K 4.1   CL 98   CO2 31*   BUN 14   CREATININE 0.6   CALCIUM 9.1       CBC:   Recent Labs   Lab 03/14/23  0552 03/15/23  0626   WBC 27.95* 29.74*   HGB 13.2* 13.6*   HCT 40.1 42.0    397       CMP:   Recent Labs   Lab 03/14/23  0552 03/15/23  0626    140   K 4.6 4.1   CL 96 98   CO2 30* 31*   * 108   BUN 9 14   CREATININE 0.6 0.6   CALCIUM 9.4 9.1   ANIONGAP 11 11       Cardiac Markers:   No results for input(s): CKMB, MYOGLOBIN, BNP, TROPISTAT in the last 48 hours.    Coagulation: No results for input(s): PT, INR, APTT in the last 48 hours.  Lactic Acid:   No results for input(s): LACTATE in the last 48 hours.    Lipase: No results for input(s): LIPASE in the last 48 hours.  Lipid Panel: No results for input(s): CHOL, HDL, LDLCALC, TRIG, CHOLHDL in the last 48 hours.  Magnesium: No results for input(s): MG in the last 48 hours.  Troponin:   Recent Labs   Lab 03/14/23 0552   TROPONINI <0.006       TSH: No results for input(s): TSH in the last 4320 hours.  Urine Culture: No results for input(s): LABURIN in the last 48 hours.  Urine Studies: No results for input(s): COLORU, APPEARANCEUA, PHUR, SPECGRAV, PROTEINUA, GLUCUA, KETONESU, BILIRUBINUA, OCCULTUA, NITRITE, UROBILINOGEN, LEUKOCYTESUR, RBCUA, WBCUA, BACTERIA, SQUAMEPITHEL, HYALINECASTS in the last 48 hours.    Invalid input(s): ZULAY    Significant Imaging: I have reviewed all pertinent imaging  results/findings within the past 24 hours.    CXR   Cardiac silhouette is within normal limits.     Bilateral interstitial and mild patchy/nodular alveolar infiltrates may be associated with pneumonitis.  This is improved at the left lung base though slightly increased in the right lung.  Follow-up recommended.     Left lower lobe cavitary lesion is better visualized on the prior CT.  Possible underlying bilateral small pulmonary nodules.  See prior CT report.     No effusion or pneumothorax.  No lobar evidence of lobar consolidation.  No acute osseous abnormality.     Emphysematous changes.     EKG  With ST change in II, III, AVF and V3       Assessment/Plan:      * COPD exacerbation  Reason for admission  CXR: lower lobe cavitary lesion.  No lobar consolidation.  Bilateral interstitial patchy infiltrates. Left worse than right.  Emphysematous changes.    Scheduled nebulizer tx, IV levofloxacin and increased IV solumedrol 80mg q6.  Supportive care.      Leukocytosis  Treating for COPD pneumonia and currently on high dose steroids   U/A negative for UTI   Blood culture NGTD      Lung nodule  Plan for PET test 3/15  Will need to be rescheduled        Tobacco dependence  Nicotine patch       Bilateral low back pain with sciatica    Continue home tramadol and gabapentin     Essential hypertension  Continue norvasc and HCTZ.        Pain in the Chest  Intermittent spasm pain with movement and rest   ST abnormalities in leads II, III, AVF and v3  Troponin and d dimer wnl  Cardiology consulted no ACS         VTE Risk Mitigation (From admission, onward)         Ordered     IP VTE LOW RISK PATIENT  Once         03/13/23 0014     Place CHIKI hose  Until discontinued         03/13/23 0014                Discharge Planning   SELVIN:      Code Status: Full Code   Is the patient medically ready for discharge?:     Reason for patient still in hospital (select all that apply): Patient trending condition  Discharge Plan A: Home with  family                  Clotilde Stallworth PA-C  Department of Hospital Medicine   [place] [facility] (3rd Fl)

## 2023-03-16 NOTE — PROGRESS NOTES
[facility] ([address] [place] [address] Medicine  Progress Note    Patient Name: Brando Culp Jr.  MRN: [id number]  Patient Class: IP- Inpatient   Admission Date: 3/12/2023  Length of Stay: 4 days  Attending Physician: Deniz Sheppard MD  Primary Care Provider: Dioni Matson MD        Subjective:     Principal Problem:COPD exacerbation        HPI:  Patient is a 62 year old male with medical history of HTN, COPD stage 4 (on 3-4 liters at home), lung lesion (recent lesions on left lung/PET scan pending 3/15) who presented to the ED with chest tightness for one week.  Patient has had increased cough and sputum production.  His sputum has changed from clear to green.  He denies fever, CP, nausea and vomiting.      Admitted for COPD exacerbation.  Scheduled nebulizer, IV steroids and IV abx.        Overview/Hospital Course:  Patient with intermittent chest pain.  ST abnormalities seen on EKG but troponin/ d dimer unremarkable.  Cardiology consulted. Patient still with significant bronchospasm on exam.       3/15: Patient still with chest tightness.  Increased solumedrol from 40mg q8 to 80mgq 6 today and CPT ordered.  ACS ruled out.      3/16 Tightness improving with increase in steroids and CPT.  Will continue current treatment plan.       Past Medical History:   Diagnosis Date    Abnormal CT scan     Arthritis     COPD (chronic obstructive pulmonary disease)     Hypertension        Past Surgical History:   Procedure Laterality Date    BRONCHOALVEOLAR LAVAGE N/A 1/18/2023    Procedure: LAVAGE, BRONCHOALVEOLAR;  Surgeon: Driss Bartholomew MD;  Location: [facility];  Service: Pulmonary;  Laterality: N/A;    BRONCHOSCOPY WITH BIOPSY Bilateral 1/18/2023    Procedure: BRONCHOSCOPY, WITH BIOPSY;  Surgeon: Driss Bartholomew MD;  Location: [facility];  Service: Pulmonary;  Laterality: Bilateral;  site: lungs    BRONCHOSCOPY, WITH BRUSH BIOPSY Bilateral 1/18/2023    Procedure: BRONCHOSCOPY, WITH BRUSH BIOPSY;   Surgeon: Driss Bartholomew MD;  Location: [facility];  Service: Pulmonary;  Laterality: Bilateral;    COLONOSCOPY      COLONOSCOPY N/A 8/21/2017    Procedure: COLONOSCOPY;  Surgeon: Jessica Fink MD;  Location: [facility];  Service: Endoscopy;  Laterality: N/A;       Review of patient's allergies indicates:  No Known Allergies    No current facility-administered medications on file prior to encounter.     Current Outpatient Medications on File Prior to Encounter   Medication Sig    albuterol (PROVENTIL HFA) 90 mcg/actuation inhaler Inhale 2 puffs into the lungs every 6 (six) hours as needed for Wheezing.    albuterol (PROVENTIL/VENTOLIN HFA) 90 mcg/actuation inhaler Inhale 1-2 puffs into the lungs every 6 (six) hours as needed for Wheezing. Rescue    albuterol-ipratropium (DUO-NEB) 2.5 mg-0.5 mg/3 mL nebulizer solution Take 3 mLs by nebulization every 6 (six) hours as needed for Wheezing. Rescue    amLODIPine (NORVASC) 10 MG tablet Take 1 tablet (10 mg total) by mouth once daily.    aspirin 81 MG Chew Take 1 tablet (81 mg total) by mouth once daily.    carvediloL (COREG) 25 MG tablet Take 1 tablet (25 mg total) by mouth 2 (two) times daily with meals.    DULoxetine (CYMBALTA) 20 MG capsule Take 1 capsule (20 mg total) by mouth once daily.    gabapentin (NEURONTIN) 300 MG capsule Take 1 capsule (300 mg total) by mouth 3 (three) times daily.    hydroCHLOROthiazide (HYDRODIURIL) 12.5 MG Tab Take 1 tablet (12.5 mg total) by mouth once daily.    LIPITOR 40 mg tablet Take 1 tablet (40 mg total) by mouth once daily.    lisinopriL (PRINIVIL,ZESTRIL) 20 MG tablet Take 2 tablets (40 mg total) by mouth once daily.    nicotine (NICODERM CQ) 7 mg/24 hr Place 1 patch onto the skin once daily.    traMADoL (ULTRAM) 50 mg tablet TAKE 1 TABLET BY MOUTH EVERY 8 HOURS AS NEEDED FOR PAIN    umeclidinium-vilanteroL (ANORO ELLIPTA) 62.5-25 mcg/actuation DsDv Inhale 1 puff into the lungs once daily. Controller     PULMO-AIDE COMPRESSOR Eva use as directed     Family History       Problem Relation (Age of Onset)    Emphysema Mother    Heart failure Father          Tobacco Use    Smoking status: Every Day     Packs/day: 0.10     Years: 37.00     Pack years: 3.70     Types: Cigarettes     Start date: 4/8/1979    Smokeless tobacco: Never    Tobacco comments:     4-5 daily 1/9/23   Substance and Sexual Activity    Alcohol use: Yes     Alcohol/week: 0.0 standard drinks     Comment: once a week    Drug use: No    Sexual activity: Not Currently     Review of Systems   Constitutional:  Positive for fatigue. Negative for chills and fever.   Respiratory:  Positive for cough, chest tightness and shortness of breath.    Cardiovascular:  Positive for chest pain (intermittent spasm). Negative for palpitations and leg swelling.   Gastrointestinal:  Negative for constipation, diarrhea, nausea and vomiting.   Genitourinary:  Negative for difficulty urinating and dysuria.   Musculoskeletal:  Positive for arthralgias. Negative for gait problem.   Skin:  Negative for pallor and rash.   Neurological:  Negative for weakness and numbness.   Psychiatric/Behavioral:  Negative for agitation and confusion.    Objective:     Vital Signs (Most Recent):  Temp: 97.5 °F (36.4 °C) (03/16/23 1134)  Pulse: 75 (03/16/23 1200)  Resp: 20 (03/16/23 1134)  BP: 114/60 (03/16/23 1134)  SpO2: 99 % (03/16/23 1134)   Vital Signs (24h Range):  Temp:  [97.5 °F (36.4 °C)-98.4 °F (36.9 °C)] 97.5 °F (36.4 °C)  Pulse:  [] 75  Resp:  [16-22] 20  SpO2:  [94 %-99 %] 99 %  BP: (112-128)/(60-71) 114/60     Weight: 66.1 kg (145 lb 11.6 oz)  Body mass index is 20.91 kg/m².    Physical Exam  Constitutional:       Appearance: Normal appearance.   Cardiovascular:      Rate and Rhythm: Normal rate and regular rhythm.   Pulmonary:      Effort: No respiratory distress.      Comments: Bronchospastic with mild improvement  + tightness     Abdominal:      General: Abdomen is  flat.      Palpations: Abdomen is soft.   Neurological:      Mental Status: He is alert and oriented to person, place, and time. Mental status is at baseline.   Psychiatric:         Mood and Affect: Mood normal.         Behavior: Behavior normal.           Significant Labs: All pertinent labs within the past 24 hours have been reviewed.  A1C: No results for input(s): HGBA1C in the last 4320 hours.  ABGs: No results for input(s): PH, PCO2, HCO3, POCSATURATED, BE, TOTALHB, COHB, METHB, O2HB, POCFIO2, PO2 in the last 48 hours.  Bilirubin:   Recent Labs   Lab 03/12/23 2019   BILITOT 0.6       BMP:   Recent Labs   Lab 03/15/23  0626         K 4.1   CL 98   CO2 31*   BUN 14   CREATININE 0.6   CALCIUM 9.1       CBC:   Recent Labs   Lab 03/15/23  0626   WBC 29.74*   HGB 13.6*   HCT 42.0          CMP:   Recent Labs   Lab 03/15/23  0626      K 4.1   CL 98   CO2 31*      BUN 14   CREATININE 0.6   CALCIUM 9.1   ANIONGAP 11       Cardiac Markers:   No results for input(s): CKMB, MYOGLOBIN, BNP, TROPISTAT in the last 48 hours.    Coagulation: No results for input(s): PT, INR, APTT in the last 48 hours.  Lactic Acid:   No results for input(s): LACTATE in the last 48 hours.    Lipase: No results for input(s): LIPASE in the last 48 hours.  Lipid Panel: No results for input(s): CHOL, HDL, LDLCALC, TRIG, CHOLHDL in the last 48 hours.  Magnesium: No results for input(s): MG in the last 48 hours.  Troponin:   No results for input(s): TROPONINI, TROPONINIHS in the last 48 hours.    TSH: No results for input(s): TSH in the last 4320 hours.  Urine Culture: No results for input(s): LABURIN in the last 48 hours.  Urine Studies: No results for input(s): COLORU, APPEARANCEUA, PHUR, SPECGRAV, PROTEINUA, GLUCUA, KETONESU, BILIRUBINUA, OCCULTUA, NITRITE, UROBILINOGEN, LEUKOCYTESUR, RBCUA, WBCUA, BACTERIA, SQUAMEPITHEL, HYALINECASTS in the last 48 hours.    Invalid input(s): ZULAY    Significant Imaging: I  have reviewed all pertinent imaging results/findings within the past 24 hours.    CXR   Cardiac silhouette is within normal limits.     Bilateral interstitial and mild patchy/nodular alveolar infiltrates may be associated with pneumonitis.  This is improved at the left lung base though slightly increased in the right lung.  Follow-up recommended.     Left lower lobe cavitary lesion is better visualized on the prior CT.  Possible underlying bilateral small pulmonary nodules.  See prior CT report.     No effusion or pneumothorax.  No lobar evidence of lobar consolidation.  No acute osseous abnormality.     Emphysematous changes.     EKG  With ST change in II, III, AVF and V3       Assessment/Plan:      * COPD exacerbation  Reason for admission  CXR: lower lobe cavitary lesion.  No lobar consolidation.  Bilateral interstitial patchy infiltrates. Left worse than right.  Emphysematous changes.    Scheduled nebulizer tx, IV levofloxacin and IV solumedrol 80mg q6.  Supportive care.      Leukocytosis  Treating for COPD pneumonia and currently on high dose steroids   U/A negative for UTI   Blood culture NGTD      Lung nodule  Plan for PET test 3/15  Will need to be rescheduled        Tobacco dependence  Nicotine patch       Bilateral low back pain with sciatica    Continue home tramadol and gabapentin     Essential hypertension  Continue norvasc and HCTZ.        Pain in the Chest  Intermittent spasm pain with movement and rest   ST abnormalities in leads II, III, AVF and v3  Troponin and d dimer wnl  Cardiology consulted no ACS         VTE Risk Mitigation (From admission, onward)         Ordered     IP VTE LOW RISK PATIENT  Once         03/13/23 0014     Place CHIKI hose  Until discontinued         03/13/23 0014                Discharge Planning   SELVIN:      Code Status: Full Code   Is the patient medically ready for discharge?:     Reason for patient still in hospital (select all that apply): Patient trending  condition  Discharge Plan A: Home with family                  Clotilde Stallworth PA-C  Department of Hospital Medicine   [place] - [facility] (3rd Fl)

## 2023-03-16 NOTE — ASSESSMENT & PLAN NOTE
Reason for admission  CXR: lower lobe cavitary lesion.  No lobar consolidation.  Bilateral interstitial patchy infiltrates. Left worse than right.  Emphysematous changes.    Scheduled nebulizer tx, IV levofloxacin and IV solumedrol 80mg q6.  Supportive care.

## 2023-03-16 NOTE — SUBJECTIVE & OBJECTIVE
Past Medical History:   Diagnosis Date    Abnormal CT scan     Arthritis     COPD (chronic obstructive pulmonary disease)     Hypertension        Past Surgical History:   Procedure Laterality Date    BRONCHOALVEOLAR LAVAGE N/A 1/18/2023    Procedure: LAVAGE, BRONCHOALVEOLAR;  Surgeon: Driss Bartholomew MD;  Location: [facility];  Service: Pulmonary;  Laterality: N/A;    BRONCHOSCOPY WITH BIOPSY Bilateral 1/18/2023    Procedure: BRONCHOSCOPY, WITH BIOPSY;  Surgeon: Driss Bartholomew MD;  Location: [facility];  Service: Pulmonary;  Laterality: Bilateral;  site: lungs    BRONCHOSCOPY, WITH BRUSH BIOPSY Bilateral 1/18/2023    Procedure: BRONCHOSCOPY, WITH BRUSH BIOPSY;  Surgeon: Driss Bartholomew MD;  Location: [facility] OR;  Service: Pulmonary;  Laterality: Bilateral;    COLONOSCOPY      COLONOSCOPY N/A 8/21/2017    Procedure: COLONOSCOPY;  Surgeon: Jessica Fink MD;  Location: [facility];  Service: Endoscopy;  Laterality: N/A;       Review of patient's allergies indicates:  No Known Allergies    No current facility-administered medications on file prior to encounter.     Current Outpatient Medications on File Prior to Encounter   Medication Sig    albuterol (PROVENTIL HFA) 90 mcg/actuation inhaler Inhale 2 puffs into the lungs every 6 (six) hours as needed for Wheezing.    albuterol (PROVENTIL/VENTOLIN HFA) 90 mcg/actuation inhaler Inhale 1-2 puffs into the lungs every 6 (six) hours as needed for Wheezing. Rescue    albuterol-ipratropium (DUO-NEB) 2.5 mg-0.5 mg/3 mL nebulizer solution Take 3 mLs by nebulization every 6 (six) hours as needed for Wheezing. Rescue    amLODIPine (NORVASC) 10 MG tablet Take 1 tablet (10 mg total) by mouth once daily.    aspirin 81 MG Chew Take 1 tablet (81 mg total) by mouth once daily.    carvediloL (COREG) 25 MG tablet Take 1 tablet (25 mg total) by mouth 2 (two) times daily with meals.    DULoxetine (CYMBALTA) 20 MG capsule Take 1 capsule (20 mg total) by mouth once  daily.    gabapentin (NEURONTIN) 300 MG capsule Take 1 capsule (300 mg total) by mouth 3 (three) times daily.    hydroCHLOROthiazide (HYDRODIURIL) 12.5 MG Tab Take 1 tablet (12.5 mg total) by mouth once daily.    LIPITOR 40 mg tablet Take 1 tablet (40 mg total) by mouth once daily.    lisinopriL (PRINIVIL,ZESTRIL) 20 MG tablet Take 2 tablets (40 mg total) by mouth once daily.    nicotine (NICODERM CQ) 7 mg/24 hr Place 1 patch onto the skin once daily.    traMADoL (ULTRAM) 50 mg tablet TAKE 1 TABLET BY MOUTH EVERY 8 HOURS AS NEEDED FOR PAIN    umeclidinium-vilanteroL (ANORO ELLIPTA) 62.5-25 mcg/actuation DsDv Inhale 1 puff into the lungs once daily. Controller    PULMO-AIDE COMPRESSOR Eva use as directed     Family History       Problem Relation (Age of Onset)    Emphysema Mother    Heart failure Father          Tobacco Use    Smoking status: Every Day     Packs/day: 0.10     Years: 37.00     Pack years: 3.70     Types: Cigarettes     Start date: 4/8/1979    Smokeless tobacco: Never    Tobacco comments:     4-5 daily 1/9/23   Substance and Sexual Activity    Alcohol use: Yes     Alcohol/week: 0.0 standard drinks     Comment: once a week    Drug use: No    Sexual activity: Not Currently     Review of Systems   Constitutional:  Positive for fatigue. Negative for chills and fever.   Respiratory:  Positive for cough, chest tightness and shortness of breath.    Cardiovascular:  Positive for chest pain (intermittent spasm). Negative for palpitations and leg swelling.   Gastrointestinal:  Negative for constipation, diarrhea, nausea and vomiting.   Genitourinary:  Negative for difficulty urinating and dysuria.   Musculoskeletal:  Positive for arthralgias. Negative for gait problem.   Skin:  Negative for pallor and rash.   Neurological:  Negative for weakness and numbness.   Psychiatric/Behavioral:  Negative for agitation and confusion.    Objective:     Vital Signs (Most Recent):  Temp: 97.5 °F (36.4 °C) (03/16/23  1134)  Pulse: 75 (03/16/23 1200)  Resp: 20 (03/16/23 1134)  BP: 114/60 (03/16/23 1134)  SpO2: 99 % (03/16/23 1134)   Vital Signs (24h Range):  Temp:  [97.5 °F (36.4 °C)-98.4 °F (36.9 °C)] 97.5 °F (36.4 °C)  Pulse:  [] 75  Resp:  [16-22] 20  SpO2:  [94 %-99 %] 99 %  BP: (112-128)/(60-71) 114/60     Weight: 66.1 kg (145 lb 11.6 oz)  Body mass index is 20.91 kg/m².    Physical Exam  Constitutional:       Appearance: Normal appearance.   Cardiovascular:      Rate and Rhythm: Normal rate and regular rhythm.   Pulmonary:      Effort: No respiratory distress.      Comments: Bronchospastic with mild improvement  + tightness     Abdominal:      General: Abdomen is flat.      Palpations: Abdomen is soft.   Neurological:      Mental Status: He is alert and oriented to person, place, and time. Mental status is at baseline.   Psychiatric:         Mood and Affect: Mood normal.         Behavior: Behavior normal.           Significant Labs: All pertinent labs within the past 24 hours have been reviewed.  A1C: No results for input(s): HGBA1C in the last 4320 hours.  ABGs: No results for input(s): PH, PCO2, HCO3, POCSATURATED, BE, TOTALHB, COHB, METHB, O2HB, POCFIO2, PO2 in the last 48 hours.  Bilirubin:   Recent Labs   Lab 03/12/23  2019   BILITOT 0.6       BMP:   Recent Labs   Lab 03/15/23  0626         K 4.1   CL 98   CO2 31*   BUN 14   CREATININE 0.6   CALCIUM 9.1       CBC:   Recent Labs   Lab 03/15/23  0626   WBC 29.74*   HGB 13.6*   HCT 42.0          CMP:   Recent Labs   Lab 03/15/23  0626      K 4.1   CL 98   CO2 31*      BUN 14   CREATININE 0.6   CALCIUM 9.1   ANIONGAP 11       Cardiac Markers:   No results for input(s): CKMB, MYOGLOBIN, BNP, TROPISTAT in the last 48 hours.    Coagulation: No results for input(s): PT, INR, APTT in the last 48 hours.  Lactic Acid:   No results for input(s): LACTATE in the last 48 hours.    Lipase: No results for input(s): LIPASE in the last 48  hours.  Lipid Panel: No results for input(s): CHOL, HDL, LDLCALC, TRIG, CHOLHDL in the last 48 hours.  Magnesium: No results for input(s): MG in the last 48 hours.  Troponin:   No results for input(s): TROPONINI, TROPONINIHS in the last 48 hours.    TSH: No results for input(s): TSH in the last 4320 hours.  Urine Culture: No results for input(s): LABURIN in the last 48 hours.  Urine Studies: No results for input(s): COLORU, APPEARANCEUA, PHUR, SPECGRAV, PROTEINUA, GLUCUA, KETONESU, BILIRUBINUA, OCCULTUA, NITRITE, UROBILINOGEN, LEUKOCYTESUR, RBCUA, WBCUA, BACTERIA, SQUAMEPITHEL, HYALINECASTS in the last 48 hours.    Invalid input(s): WRIGHTSUR    Significant Imaging: I have reviewed all pertinent imaging results/findings within the past 24 hours.    CXR   Cardiac silhouette is within normal limits.     Bilateral interstitial and mild patchy/nodular alveolar infiltrates may be associated with pneumonitis.  This is improved at the left lung base though slightly increased in the right lung.  Follow-up recommended.     Left lower lobe cavitary lesion is better visualized on the prior CT.  Possible underlying bilateral small pulmonary nodules.  See prior CT report.     No effusion or pneumothorax.  No lobar evidence of lobar consolidation.  No acute osseous abnormality.     Emphysematous changes.     EKG  With ST change in II, III, AVF and V3

## 2023-03-16 NOTE — PLAN OF CARE
Problem: Adult Inpatient Plan of Care  Goal: Plan of Care Review  Outcome: Ongoing, Progressing     Problem: Adult Inpatient Plan of Care  Goal: Patient-Specific Goal (Individualized)  Outcome: Ongoing, Progressing     Problem: Adult Inpatient Plan of Care  Goal: Optimal Comfort and Wellbeing  Outcome: Ongoing, Progressing     Problem: Pain Acute  Goal: Acceptable Pain Control and Functional Ability  Outcome: Ongoing, Progressing

## 2023-03-16 NOTE — PLAN OF CARE
Pt alert and oriented X4. Pt independent in room. Normal sinus on tele. Iv levaquin daily,  Iv steroids 80mg q 6 and CPT ordered. Pt reports he is feeling better and less winded with ambulation. Pt with chronic back pain and scheduled tramadol and gabapentin helping. Pt remains on Home O2 at 3L nc.         Problem: Adult Inpatient Plan of Care  Goal: Plan of Care Review  Outcome: Ongoing, Progressing  Goal: Patient-Specific Goal (Individualized)  Outcome: Ongoing, Progressing  Goal: Absence of Hospital-Acquired Illness or Injury  Outcome: Ongoing, Progressing  Goal: Optimal Comfort and Wellbeing  Outcome: Ongoing, Progressing  Goal: Readiness for Transition of Care  Outcome: Ongoing, Progressing     Problem: Gas Exchange Impaired  Goal: Optimal Gas Exchange  Outcome: Ongoing, Progressing     Problem: Pain Acute  Goal: Acceptable Pain Control and Functional Ability  Outcome: Ongoing, Progressing

## 2023-03-17 NOTE — PROGRESS NOTES
[facility] ([address] [place] [address] Medicine  Progress Note    Patient Name: Brando Culp Jr.  MRN: [id number]  Patient Class: IP- Inpatient   Admission Date: 3/12/2023  Length of Stay: 5 days  Attending Physician: Deniz Sheppard MD  Primary Care Provider: Dioni Matson MD        Subjective:     Principal Problem:COPD exacerbation        HPI:  Patient is a 62 year old male with medical history of HTN, COPD stage 4 (on 3-4 liters at home), lung lesion (recent lesions on left lung/PET scan pending 3/15) who presented to the ED with chest tightness for one week.  Patient has had increased cough and sputum production.  His sputum has changed from clear to green.  He denies fever, CP, nausea and vomiting.      Admitted for COPD exacerbation.  Scheduled nebulizer, IV steroids and IV abx.        Overview/Hospital Course:  Patient with intermittent chest pain.  ST abnormalities seen on EKG but troponin/ d dimer unremarkable.  Cardiology consulted. Patient still with significant bronchospasm on exam.       3/15: Patient still with chest tightness.  Increased solumedrol from 40mg q8 to 80mgq 6 today and CPT ordered.  ACS ruled out.      3/16 Tightness improving with increase in steroids and CPT.  Will continue current treatment plan.     3/17 Patient with significant improvement in chest tightness.  Will decrease IV steroids and plan for discharge tomorrow.        Past Medical History:   Diagnosis Date    Abnormal CT scan     Arthritis     COPD (chronic obstructive pulmonary disease)     Hypertension        Past Surgical History:   Procedure Laterality Date    BRONCHOALVEOLAR LAVAGE N/A 1/18/2023    Procedure: LAVAGE, BRONCHOALVEOLAR;  Surgeon: Driss Bartholomew MD;  Location: [facility] OR;  Service: Pulmonary;  Laterality: N/A;    BRONCHOSCOPY WITH BIOPSY Bilateral 1/18/2023    Procedure: BRONCHOSCOPY, WITH BIOPSY;  Surgeon: Driss Bartholomew MD;  Location: [facility] OR;  Service: Pulmonary;  Laterality:  Bilateral;  site: lungs    BRONCHOSCOPY, WITH BRUSH BIOPSY Bilateral 1/18/2023    Procedure: BRONCHOSCOPY, WITH BRUSH BIOPSY;  Surgeon: Driss Bartholomew MD;  Location: [facility];  Service: Pulmonary;  Laterality: Bilateral;    COLONOSCOPY      COLONOSCOPY N/A 8/21/2017    Procedure: COLONOSCOPY;  Surgeon: Jessica Fink MD;  Location: [facility];  Service: Endoscopy;  Laterality: N/A;       Review of patient's allergies indicates:  No Known Allergies    No current facility-administered medications on file prior to encounter.     Current Outpatient Medications on File Prior to Encounter   Medication Sig    albuterol (PROVENTIL HFA) 90 mcg/actuation inhaler Inhale 2 puffs into the lungs every 6 (six) hours as needed for Wheezing.    albuterol (PROVENTIL/VENTOLIN HFA) 90 mcg/actuation inhaler Inhale 1-2 puffs into the lungs every 6 (six) hours as needed for Wheezing. Rescue    albuterol-ipratropium (DUO-NEB) 2.5 mg-0.5 mg/3 mL nebulizer solution Take 3 mLs by nebulization every 6 (six) hours as needed for Wheezing. Rescue    amLODIPine (NORVASC) 10 MG tablet Take 1 tablet (10 mg total) by mouth once daily.    aspirin 81 MG Chew Take 1 tablet (81 mg total) by mouth once daily.    carvediloL (COREG) 25 MG tablet Take 1 tablet (25 mg total) by mouth 2 (two) times daily with meals.    DULoxetine (CYMBALTA) 20 MG capsule Take 1 capsule (20 mg total) by mouth once daily.    gabapentin (NEURONTIN) 300 MG capsule Take 1 capsule (300 mg total) by mouth 3 (three) times daily.    hydroCHLOROthiazide (HYDRODIURIL) 12.5 MG Tab Take 1 tablet (12.5 mg total) by mouth once daily.    LIPITOR 40 mg tablet Take 1 tablet (40 mg total) by mouth once daily.    lisinopriL (PRINIVIL,ZESTRIL) 20 MG tablet Take 2 tablets (40 mg total) by mouth once daily.    nicotine (NICODERM CQ) 7 mg/24 hr Place 1 patch onto the skin once daily.    traMADoL (ULTRAM) 50 mg tablet TAKE 1 TABLET BY MOUTH EVERY 8 HOURS AS NEEDED FOR PAIN     umeclidinium-vilanteroL (ANORO ELLIPTA) 62.5-25 mcg/actuation DsDv Inhale 1 puff into the lungs once daily. Controller    PULMO-AIDE COMPRESSOR Eva use as directed     Family History       Problem Relation (Age of Onset)    Emphysema Mother    Heart failure Father          Tobacco Use    Smoking status: Every Day     Packs/day: 0.10     Years: 37.00     Pack years: 3.70     Types: Cigarettes     Start date: 4/8/1979    Smokeless tobacco: Never    Tobacco comments:     4-5 daily 1/9/23   Substance and Sexual Activity    Alcohol use: Yes     Alcohol/week: 0.0 standard drinks     Comment: once a week    Drug use: No    Sexual activity: Not Currently     Review of Systems   Constitutional:  Positive for fatigue. Negative for chills and fever.   Respiratory:  Positive for cough, chest tightness and shortness of breath.    Cardiovascular:  Positive for chest pain (intermittent spasm). Negative for palpitations and leg swelling.   Gastrointestinal:  Negative for constipation, diarrhea, nausea and vomiting.   Genitourinary:  Negative for difficulty urinating and dysuria.   Musculoskeletal:  Positive for arthralgias. Negative for gait problem.   Skin:  Negative for pallor and rash.   Neurological:  Negative for weakness and numbness.   Psychiatric/Behavioral:  Negative for agitation and confusion.    Objective:     Vital Signs (Most Recent):  Temp: 98.7 °F (37.1 °C) (03/17/23 1125)  Pulse: 79 (03/17/23 1200)  Resp: 16 (03/17/23 1125)  BP: 118/70 (03/17/23 1125)  SpO2: (!) 94 % (03/17/23 1125)   Vital Signs (24h Range):  Temp:  [97.3 °F (36.3 °C)-98.7 °F (37.1 °C)] 98.7 °F (37.1 °C)  Pulse:  [65-80] 79  Resp:  [16-20] 16  SpO2:  [94 %-100 %] 94 %  BP: (118-140)/(65-84) 118/70     Weight: 66.1 kg (145 lb 11.6 oz)  Body mass index is 20.91 kg/m².    Physical Exam  Constitutional:       Appearance: Normal appearance.   Cardiovascular:      Rate and Rhythm: Normal rate and regular rhythm.   Pulmonary:      Effort: No respiratory  distress.      Comments: Bronchospastic with mild improvement  + tightness     Abdominal:      General: Abdomen is flat.      Palpations: Abdomen is soft.   Neurological:      Mental Status: He is alert and oriented to person, place, and time. Mental status is at baseline.   Psychiatric:         Mood and Affect: Mood normal.         Behavior: Behavior normal.           Significant Labs: All pertinent labs within the past 24 hours have been reviewed.  A1C: No results for input(s): HGBA1C in the last 4320 hours.  ABGs: No results for input(s): PH, PCO2, HCO3, POCSATURATED, BE, TOTALHB, COHB, METHB, O2HB, POCFIO2, PO2 in the last 48 hours.  Bilirubin:   Recent Labs   Lab 03/12/23 2019   BILITOT 0.6       BMP:   Recent Labs   Lab 03/17/23 0548         K 4.2   CL 94*   CO2 35*   BUN 16   CREATININE 0.6   CALCIUM 8.6*       CBC:   Recent Labs   Lab 03/17/23 0548   WBC 25.46*   HGB 13.3*   HCT 40.2          CMP:   Recent Labs   Lab 03/17/23 0548      K 4.2   CL 94*   CO2 35*      BUN 16   CREATININE 0.6   CALCIUM 8.6*   ANIONGAP 8       Cardiac Markers:   No results for input(s): CKMB, MYOGLOBIN, BNP, TROPISTAT in the last 48 hours.    Coagulation: No results for input(s): PT, INR, APTT in the last 48 hours.  Lactic Acid:   No results for input(s): LACTATE in the last 48 hours.    Lipase: No results for input(s): LIPASE in the last 48 hours.  Lipid Panel: No results for input(s): CHOL, HDL, LDLCALC, TRIG, CHOLHDL in the last 48 hours.  Magnesium: No results for input(s): MG in the last 48 hours.  Troponin:   No results for input(s): TROPONINI, TROPONINIHS in the last 48 hours.    TSH: No results for input(s): TSH in the last 4320 hours.  Urine Culture: No results for input(s): LABURIN in the last 48 hours.  Urine Studies: No results for input(s): COLORU, APPEARANCEUA, PHUR, SPECGRAV, PROTEINUA, GLUCUA, KETONESU, BILIRUBINUA, OCCULTUA, NITRITE, UROBILINOGEN, LEUKOCYTESUR, RBCUA, WBCUA,  BACTERIA, SQUAMEPITHEL, HYALINECASTS in the last 48 hours.    Invalid input(s): ZULAY    Significant Imaging: I have reviewed all pertinent imaging results/findings within the past 24 hours.    CXR   Cardiac silhouette is within normal limits.     Bilateral interstitial and mild patchy/nodular alveolar infiltrates may be associated with pneumonitis.  This is improved at the left lung base though slightly increased in the right lung.  Follow-up recommended.     Left lower lobe cavitary lesion is better visualized on the prior CT.  Possible underlying bilateral small pulmonary nodules.  See prior CT report.     No effusion or pneumothorax.  No lobar evidence of lobar consolidation.  No acute osseous abnormality.     Emphysematous changes.     EKG  With ST change in II, III, AVF and V3       Assessment/Plan:      * COPD exacerbation  Reason for admission  CXR: lower lobe cavitary lesion.  No lobar consolidation.  Bilateral interstitial patchy infiltrates. Left worse than right.  Emphysematous changes.    Scheduled nebulizer tx, IV levofloxacin and IV solumedrol 80mg q6.  Supportive care.      3/17 Decreased IV steroids and plan for discharge tomorrow.      Leukocytosis  Treating for COPD pneumonia and currently on high dose steroids   U/A negative for UTI   Blood culture NG  On steroids       Lung nodule  Plan for PET next week        Tobacco dependence  Nicotine patch       Bilateral low back pain with sciatica    Continue home tramadol and gabapentin     Essential hypertension  Continue norvasc and HCTZ.        Pain in the Chest  Intermittent spasm pain with movement and rest   ST abnormalities in leads II, III, AVF and v3  Troponin and d dimer wnl  Cardiology consulted no ACS       VTE Risk Mitigation (From admission, onward)           Ordered     IP VTE LOW RISK PATIENT  Once         03/13/23 0014     Place CHIKI hose  Until discontinued         03/13/23 0014                    Discharge Planning   SELVIN:      Code  Status: Full Code   Is the patient medically ready for discharge?:     Reason for patient still in hospital (select all that apply): Patient trending condition  Discharge Plan A: Home with family          Patient requesting to d/c on solumedrol vs prednisone because of sweating and intolerance of prednisone at last admission.        Clotilde Stallworth PA-C  Department of Hospital Medicine   [place] [facility] Surg (3rd Fl)

## 2023-03-17 NOTE — ASSESSMENT & PLAN NOTE
Treating for COPD pneumonia and currently on high dose steroids   U/A negative for UTI   Blood culture NG  On steroids

## 2023-03-17 NOTE — SUBJECTIVE & OBJECTIVE
Past Medical History:   Diagnosis Date    Abnormal CT scan     Arthritis     COPD (chronic obstructive pulmonary disease)     Hypertension        Past Surgical History:   Procedure Laterality Date    BRONCHOALVEOLAR LAVAGE N/A 1/18/2023    Procedure: LAVAGE, BRONCHOALVEOLAR;  Surgeon: Driss Bartholomew MD;  Location: [facility];  Service: Pulmonary;  Laterality: N/A;    BRONCHOSCOPY WITH BIOPSY Bilateral 1/18/2023    Procedure: BRONCHOSCOPY, WITH BIOPSY;  Surgeon: Driss Bartholomew MD;  Location: [facility];  Service: Pulmonary;  Laterality: Bilateral;  site: lungs    BRONCHOSCOPY, WITH BRUSH BIOPSY Bilateral 1/18/2023    Procedure: BRONCHOSCOPY, WITH BRUSH BIOPSY;  Surgeon: Driss Bartholomew MD;  Location: [facility] OR;  Service: Pulmonary;  Laterality: Bilateral;    COLONOSCOPY      COLONOSCOPY N/A 8/21/2017    Procedure: COLONOSCOPY;  Surgeon: Jessica Fink MD;  Location: [facility];  Service: Endoscopy;  Laterality: N/A;       Review of patient's allergies indicates:  No Known Allergies    No current facility-administered medications on file prior to encounter.     Current Outpatient Medications on File Prior to Encounter   Medication Sig    albuterol (PROVENTIL HFA) 90 mcg/actuation inhaler Inhale 2 puffs into the lungs every 6 (six) hours as needed for Wheezing.    albuterol (PROVENTIL/VENTOLIN HFA) 90 mcg/actuation inhaler Inhale 1-2 puffs into the lungs every 6 (six) hours as needed for Wheezing. Rescue    albuterol-ipratropium (DUO-NEB) 2.5 mg-0.5 mg/3 mL nebulizer solution Take 3 mLs by nebulization every 6 (six) hours as needed for Wheezing. Rescue    amLODIPine (NORVASC) 10 MG tablet Take 1 tablet (10 mg total) by mouth once daily.    aspirin 81 MG Chew Take 1 tablet (81 mg total) by mouth once daily.    carvediloL (COREG) 25 MG tablet Take 1 tablet (25 mg total) by mouth 2 (two) times daily with meals.    DULoxetine (CYMBALTA) 20 MG capsule Take 1 capsule (20 mg total) by mouth once  daily.    gabapentin (NEURONTIN) 300 MG capsule Take 1 capsule (300 mg total) by mouth 3 (three) times daily.    hydroCHLOROthiazide (HYDRODIURIL) 12.5 MG Tab Take 1 tablet (12.5 mg total) by mouth once daily.    LIPITOR 40 mg tablet Take 1 tablet (40 mg total) by mouth once daily.    lisinopriL (PRINIVIL,ZESTRIL) 20 MG tablet Take 2 tablets (40 mg total) by mouth once daily.    nicotine (NICODERM CQ) 7 mg/24 hr Place 1 patch onto the skin once daily.    traMADoL (ULTRAM) 50 mg tablet TAKE 1 TABLET BY MOUTH EVERY 8 HOURS AS NEEDED FOR PAIN    umeclidinium-vilanteroL (ANORO ELLIPTA) 62.5-25 mcg/actuation DsDv Inhale 1 puff into the lungs once daily. Controller    PULMO-AIDE COMPRESSOR Eva use as directed     Family History       Problem Relation (Age of Onset)    Emphysema Mother    Heart failure Father          Tobacco Use    Smoking status: Every Day     Packs/day: 0.10     Years: 37.00     Pack years: 3.70     Types: Cigarettes     Start date: 4/8/1979    Smokeless tobacco: Never    Tobacco comments:     4-5 daily 1/9/23   Substance and Sexual Activity    Alcohol use: Yes     Alcohol/week: 0.0 standard drinks     Comment: once a week    Drug use: No    Sexual activity: Not Currently     Review of Systems   Constitutional:  Positive for fatigue. Negative for chills and fever.   Respiratory:  Positive for cough, chest tightness and shortness of breath.    Cardiovascular:  Positive for chest pain (intermittent spasm). Negative for palpitations and leg swelling.   Gastrointestinal:  Negative for constipation, diarrhea, nausea and vomiting.   Genitourinary:  Negative for difficulty urinating and dysuria.   Musculoskeletal:  Positive for arthralgias. Negative for gait problem.   Skin:  Negative for pallor and rash.   Neurological:  Negative for weakness and numbness.   Psychiatric/Behavioral:  Negative for agitation and confusion.    Objective:     Vital Signs (Most Recent):  Temp: 98.7 °F (37.1 °C) (03/17/23  1125)  Pulse: 79 (03/17/23 1200)  Resp: 16 (03/17/23 1125)  BP: 118/70 (03/17/23 1125)  SpO2: (!) 94 % (03/17/23 1125)   Vital Signs (24h Range):  Temp:  [97.3 °F (36.3 °C)-98.7 °F (37.1 °C)] 98.7 °F (37.1 °C)  Pulse:  [65-80] 79  Resp:  [16-20] 16  SpO2:  [94 %-100 %] 94 %  BP: (118-140)/(65-84) 118/70     Weight: 66.1 kg (145 lb 11.6 oz)  Body mass index is 20.91 kg/m².    Physical Exam  Constitutional:       Appearance: Normal appearance.   Cardiovascular:      Rate and Rhythm: Normal rate and regular rhythm.   Pulmonary:      Effort: No respiratory distress.      Comments: Bronchospastic with mild improvement  + tightness     Abdominal:      General: Abdomen is flat.      Palpations: Abdomen is soft.   Neurological:      Mental Status: He is alert and oriented to person, place, and time. Mental status is at baseline.   Psychiatric:         Mood and Affect: Mood normal.         Behavior: Behavior normal.           Significant Labs: All pertinent labs within the past 24 hours have been reviewed.  A1C: No results for input(s): HGBA1C in the last 4320 hours.  ABGs: No results for input(s): PH, PCO2, HCO3, POCSATURATED, BE, TOTALHB, COHB, METHB, O2HB, POCFIO2, PO2 in the last 48 hours.  Bilirubin:   Recent Labs   Lab 03/12/23  2019   BILITOT 0.6       BMP:   Recent Labs   Lab 03/17/23  0548         K 4.2   CL 94*   CO2 35*   BUN 16   CREATININE 0.6   CALCIUM 8.6*       CBC:   Recent Labs   Lab 03/17/23  0548   WBC 25.46*   HGB 13.3*   HCT 40.2          CMP:   Recent Labs   Lab 03/17/23  0548      K 4.2   CL 94*   CO2 35*      BUN 16   CREATININE 0.6   CALCIUM 8.6*   ANIONGAP 8       Cardiac Markers:   No results for input(s): CKMB, MYOGLOBIN, BNP, TROPISTAT in the last 48 hours.    Coagulation: No results for input(s): PT, INR, APTT in the last 48 hours.  Lactic Acid:   No results for input(s): LACTATE in the last 48 hours.    Lipase: No results for input(s): LIPASE in the last 48  hours.  Lipid Panel: No results for input(s): CHOL, HDL, LDLCALC, TRIG, CHOLHDL in the last 48 hours.  Magnesium: No results for input(s): MG in the last 48 hours.  Troponin:   No results for input(s): TROPONINI, TROPONINIHS in the last 48 hours.    TSH: No results for input(s): TSH in the last 4320 hours.  Urine Culture: No results for input(s): LABURIN in the last 48 hours.  Urine Studies: No results for input(s): COLORU, APPEARANCEUA, PHUR, SPECGRAV, PROTEINUA, GLUCUA, KETONESU, BILIRUBINUA, OCCULTUA, NITRITE, UROBILINOGEN, LEUKOCYTESUR, RBCUA, WBCUA, BACTERIA, SQUAMEPITHEL, HYALINECASTS in the last 48 hours.    Invalid input(s): WRIGHTSUR    Significant Imaging: I have reviewed all pertinent imaging results/findings within the past 24 hours.    CXR   Cardiac silhouette is within normal limits.     Bilateral interstitial and mild patchy/nodular alveolar infiltrates may be associated with pneumonitis.  This is improved at the left lung base though slightly increased in the right lung.  Follow-up recommended.     Left lower lobe cavitary lesion is better visualized on the prior CT.  Possible underlying bilateral small pulmonary nodules.  See prior CT report.     No effusion or pneumothorax.  No lobar evidence of lobar consolidation.  No acute osseous abnormality.     Emphysematous changes.     EKG  With ST change in II, III, AVF and V3

## 2023-03-17 NOTE — ASSESSMENT & PLAN NOTE
Reason for admission  CXR: lower lobe cavitary lesion.  No lobar consolidation.  Bilateral interstitial patchy infiltrates. Left worse than right.  Emphysematous changes.    Scheduled nebulizer tx, IV levofloxacin and IV solumedrol 80mg q6.  Supportive care.      3/17 Decreased IV steroids and plan for discharge tomorrow.

## 2023-03-17 NOTE — PLAN OF CARE
Patient has PET scan that needs to be rescheduled.   CM did contact Dr. Cantrell via in-basket who states he and his team is working on getting PET scan rescheduled on their end.        Follow-up Information       Estelle Gonzalez NP. Go on 3/23/2023.    Specialty: Pulmonary Disease  Why: at 8:00 for Hosptial follow up and to re-schedule PET scan.  Contact information:  [address] [place] [address]  [place] [id number] [phone]

## 2023-03-17 NOTE — PLAN OF CARE
Problem: Adult Inpatient Plan of Care  Goal: Plan of Care Review  Outcome: Ongoing, Progressing  Goal: Optimal Comfort and Wellbeing  Outcome: Ongoing, Progressing     Problem: Gas Exchange Impaired  Goal: Optimal Gas Exchange  Outcome: Ongoing, Progressing     Problem: Fall Injury Risk  Goal: Absence of Fall and Fall-Related Injury  Outcome: Ongoing, Progressing

## 2023-03-17 NOTE — PLAN OF CARE
03/17/23 1114   Medicare Message   Important Message from Medicare regarding Discharge Appeal Rights Given to patient/caregiver;Explained to patient/caregiver;Signed/date by patient/caregiver   Date IMM was signed 03/17/23   Time IMM was signed 110

## 2023-03-18 PROBLEM — J96.10 CHRONIC RESPIRATORY FAILURE: Status: ACTIVE | Noted: 2023-01-01

## 2023-03-18 NOTE — PLAN OF CARE
[facility] - Med Surg (3rd Fl)  Discharge Final Note    Primary Care Provider: Dioni Matson MD    Expected Discharge Date: 3/18/2023    Final Discharge Note (most recent)       Final Note - 03/18/23 0925          Final Note    Assessment Type Final Discharge Note     Anticipated Discharge Disposition Home or Self Care     Hospital Resources/Appts/Education Provided Appointments scheduled and added to AVS        Post-Acute Status    Post-Acute Authorization Other     Other Status No Post-Acute Service Needs     Discharge Delays None known at this time                     Important Message from Medicare  Important Message from Medicare regarding Discharge Appeal Rights: Given to patient/caregiver, Explained to patient/caregiver, Signed/date by patient/caregiver     Date IMM was signed: 03/17/23  Time IMM was signed: 1105    Contact Info       Estelle Gonzalez NP   Specialty: Pulmonary Disease    [address]  [place] [id number]   Phone: [phone]       Next Steps: Go on 3/23/2023    Instructions: at 8:00 for Hosptial follow up and to re-schedule PET scan.

## 2023-03-18 NOTE — ASSESSMENT & PLAN NOTE
Reason for admission  CXR: lower lobe cavitary lesion.  No lobar consolidation.  Bilateral interstitial patchy infiltrates. Left worse than right.  Emphysematous changes.    Scheduled nebulizer tx, IV levofloxacin and IV solumedrol 80mg q6.  Supportive care.      3/17 Decreased IV steroids and plan for discharge tomorrow.      3/18will send home on medrol dose pack. He is intolerant of prednisone. Discussed continued duonebs scheduled with PRN albuterol. mucinex and medrol sent to the pharmacy.

## 2023-03-18 NOTE — DISCHARGE SUMMARY
[place] - [facility] Surg ([facility])  [facility] Medicine  Discharge Summary      Patient Name: Brando Culp Jr.  MRN: [id number]  [facility]: [id number]  Patient Class: IP- Inpatient  Admission Date: 3/12/2023  Hospital Length of Stay: 6 days  Discharge Date and Time:  03/18/2023 8:51 AM  Attending Physician: Deniz Sheppard MD   Discharging Provider: Rocio Rider MD  Primary Care Provider: Dioni Matson MD    Primary Care Team: Networked reference to record PCT     HPI:   Patient is a 62 year old male with medical history of HTN, COPD stage 4 (on 3-4 liters at home), lung lesion (recent lesions on left lung/PET scan pending 3/15) who presented to the ED with chest tightness for one week.  Patient has had increased cough and sputum production.  His sputum has changed from clear to green.  He denies fever, CP, nausea and vomiting.      Admitted for COPD exacerbation.  Scheduled nebulizer, IV steroids and IV abx.        * No surgery found *      Hospital Course:   Patient with intermittent chest pain.  ST abnormalities seen on EKG but troponin/ d dimer unremarkable.  Cardiology consulted. Patient still with significant bronchospasm on exam.       3/15: Patient still with chest tightness.  Increased solumedrol from 40mg q8 to 80mgq 6 today and CPT ordered.  ACS ruled out.      3/16 Tightness improving with increase in steroids and CPT.  Will continue current treatment plan.     3/17 Patient with significant improvement in chest tightness.  Will decrease IV steroids and plan for discharge tomorrow.      3/18  C/o some wheezing overnight. Still with intermittent SOB but feeling back to baseline. Cough productive of yellowish sputum.       Goals of Care Treatment Preferences:  Code Status: Full Code      Consults:   Consults (From admission, onward)        Status Ordering Provider     [facility]        Provider:  (Not yet assigned)    Completed NIKI GARSIA          Pulmonary  * COPD  exacerbation  Reason for admission  CXR: lower lobe cavitary lesion.  No lobar consolidation.  Bilateral interstitial patchy infiltrates. Left worse than right.  Emphysematous changes.    Scheduled nebulizer tx, IV levofloxacin and IV solumedrol 80mg q6.  Supportive care.      3/17 Decreased IV steroids and plan for discharge tomorrow.      3/18wenceslao send home on medrol dose pack. He is intolerant of prednisone. Discussed continued duonebs scheduled with PRN albuterol. mucinex and medrol sent to the pharmacy.    Chronic respiratory failure  On home o2. Back to baseline.    Lung nodule  Plan for PET next week      PET scheduled for Wednesday.    Cardiac/Vascular  Essential hypertension  Continue norvasc and HCTZ.      132/72  He is HD stable.    Pain in the Chest  Intermittent spasm pain with movement and rest   ST abnormalities in leads II, III, AVF and v3  Troponin and d dimer wnl  Cardiology consulted no ACS       Oncology  Leukocytosis  Treating for COPD pneumonia and currently on high dose steroids   U/A negative for UTI   Blood culture NG  On steroids     3/18  Completed 5 days of azithromycin. This is most c/w corticosteroid induced leukocytosis. Afebrile. Improved dyspnea and on home o2.      Orthopedic  Bilateral low back pain with sciatica    Continue home tramadol and gabapentin     Other  Tobacco dependence  Nicotine patch         Final Active Diagnoses:    Diagnosis Date Noted POA    PRINCIPAL PROBLEM:  COPD exacerbation [J44.1] 11/08/2022 Yes    Chronic respiratory failure [J96.10] 03/18/2023 Yes    Leukocytosis [D72.829] 03/14/2023 Yes    Lung nodule [R91.1] 03/13/2023 Yes    Tobacco dependence [F17.200] 04/06/2017 Yes    Bilateral low back pain with sciatica [M54.40] 04/08/2016 Yes    Essential hypertension [I10] 12/30/2015 Yes    Pain in the Chest [R07.9] 06/22/2015 Yes      Problems Resolved During this Admission:       Discharged Condition: fair    Disposition: Home or Self Care    Follow  Up:   Follow-up Information     Estelle Gonzalez NP. Go on 3/23/2023.    Specialty: Pulmonary Disease  Why: at 8:00 for Hosptial follow up and to re-schedule PET scan.  Contact information:  [address]  [place] [address] [phone]                       Patient Instructions:   No discharge procedures on file.    Significant Diagnostic Studies: Labs:   BMP:   Recent Labs   Lab 03/17/23  0548         K 4.2   CL 94*   CO2 35*   BUN 16   CREATININE 0.6   CALCIUM 8.6*    and CBC   Recent Labs   Lab 03/17/23  0548   WBC 25.46*   HGB 13.3*   HCT 40.2          Pending Diagnostic Studies:     None         Medications:  Reconciled Home Medications:      Medication List      START taking these medications    guaiFENesin 600 mg 12 hr tablet  Commonly known as: MUCINEX  Take 1 tablet (600 mg total) by mouth 2 (two) times daily. for 10 days     methylPREDNISolone 4 mg tablet  Commonly known as: MEDROL DOSEPACK  use as directed        CHANGE how you take these medications    albuterol 90 mcg/actuation inhaler  Commonly known as: PROVENTIL/VENTOLIN HFA  Inhale 1-2 puffs into the lungs every 6 (six) hours as needed for Wheezing. Rescue  What changed: Another medication with the same name was removed. Continue taking this medication, and follow the directions you see here.        CONTINUE taking these medications    albuterol-ipratropium 2.5 mg-0.5 mg/3 mL nebulizer solution  Commonly known as: DUO-NEB  Take 3 mLs by nebulization every 6 (six) hours as needed for Wheezing. Rescue     amLODIPine 10 MG tablet  Commonly known as: NORVASC  Take 1 tablet (10 mg total) by mouth once daily.     ANORO ELLIPTA 62.5-25 mcg/actuation Dsdv  Generic drug: umeclidinium-vilanteroL  Inhale 1 puff into the lungs once daily. Controller     carvediloL 25 MG tablet  Commonly known as: COREG  Take 1 tablet (25 mg total) by mouth 2 (two) times daily with meals.     DULoxetine 20 MG capsule  Commonly known as: CYMBALTA  Take  1 capsule (20 mg total) by mouth once daily.     gabapentin 300 MG capsule  Commonly known as: NEURONTIN  Take 1 capsule (300 mg total) by mouth 3 (three) times daily.     hydroCHLOROthiazide 12.5 MG Tab  Commonly known as: HYDRODIURIL  Take 1 tablet (12.5 mg total) by mouth once daily.     LIPITOR 40 MG tablet  Generic drug: atorvastatin  Take 1 tablet (40 mg total) by mouth once daily.     lisinopriL 20 MG tablet  Commonly known as: PRINIVIL,ZESTRIL  Take 2 tablets (40 mg total) by mouth once daily.     nicotine 7 mg/24 hr  Commonly known as: NICODERM CQ  Place 1 patch onto the skin once daily.     PULMO-AIDE COMPRESSOR Eva  Generic drug: compressor, for nebulizer  use as directed     traMADoL 50 mg tablet  Commonly known as: ULTRAM  TAKE 1 TABLET BY MOUTH EVERY 8 HOURS AS NEEDED FOR PAIN        STOP taking these medications    aspirin 81 MG Chew            Indwelling Lines/Drains at time of discharge:   Lines/Drains/Airways     None                 Time spent on the discharge of patient: 25 minutes         Rocio Rider MD  Department of Hospital Medicine  [place] - [facility] Surg (3rd Fl)

## 2023-03-18 NOTE — PLAN OF CARE
Problem: Adult Inpatient Plan of Care  Goal: Absence of Hospital-Acquired Illness or Injury  Outcome: Ongoing, Progressing     Problem: Adult Inpatient Plan of Care  Goal: Optimal Comfort and Wellbeing  Outcome: Ongoing, Progressing     Problem: Gas Exchange Impaired  Goal: Optimal Gas Exchange  Outcome: Ongoing, Progressing     Problem: Pain Acute  Goal: Acceptable Pain Control and Functional Ability  Outcome: Ongoing, Progressing

## 2023-03-18 NOTE — PROGRESS NOTES
[place] [facility] ([facility])  [facility] Medicine  Progress Note    Patient Name: Brando Culp Jr.  MRN: [id number]  Patient Class: IP- Inpatient   Admission Date: 3/12/2023  Length of Stay: 6 days  Attending Physician: Deniz Sheppard MD  Primary Care Provider: Dioni Matson MD        Subjective:     Principal Problem:COPD exacerbation        HPI:  Patient is a 62 year old male with medical history of HTN, COPD stage 4 (on 3-4 liters at home), lung lesion (recent lesions on left lung/PET scan pending 3/15) who presented to the ED with chest tightness for one week.  Patient has had increased cough and sputum production.  His sputum has changed from clear to green.  He denies fever, CP, nausea and vomiting.      Admitted for COPD exacerbation.  Scheduled nebulizer, IV steroids and IV abx.        Overview/Hospital Course:  Patient with intermittent chest pain.  ST abnormalities seen on EKG but troponin/ d dimer unremarkable.  Cardiology consulted. Patient still with significant bronchospasm on exam.       3/15: Patient still with chest tightness.  Increased solumedrol from 40mg q8 to 80mgq 6 today and CPT ordered.  ACS ruled out.      3/16 Tightness improving with increase in steroids and CPT.  Will continue current treatment plan.     3/17 Patient with significant improvement in chest tightness.  Will decrease IV steroids and plan for discharge tomorrow.      3/18  C/o some wheezing overnight. Still with intermittent SOB but feeling back to baseline. Cough productive of yellowish sputum.          Review of Systems   Constitutional:  Negative for chills, fatigue and fever.   Respiratory:  Positive for cough and shortness of breath. Negative for chest tightness.    Cardiovascular:  Negative for chest pain (intermittent spasm), palpitations and leg swelling.   Gastrointestinal:  Negative for constipation, diarrhea, nausea and vomiting.   Genitourinary:  Negative for difficulty urinating and dysuria.   Musculoskeletal:   Positive for arthralgias. Negative for gait problem.   Skin:  Negative for pallor and rash.   Neurological:  Negative for weakness and numbness.   Psychiatric/Behavioral:  Negative for agitation and confusion.    Objective:     Vital Signs (Most Recent):  Temp: 97.8 °F (36.6 °C) (03/18/23 0722)  Pulse: 67 (03/18/23 0800)  Resp: (!) 24 (03/18/23 0722)  BP: 132/72 (03/18/23 0722)  SpO2: 100 % (03/18/23 0722)   Vital Signs (24h Range):  Temp:  [97.8 °F (36.6 °C)-98.7 °F (37.1 °C)] 97.8 °F (36.6 °C)  Pulse:  [63-79] 67  Resp:  [16-24] 24  SpO2:  [94 %-100 %] 100 %  BP: (115-135)/(64-75) 132/72     Weight: 66.1 kg (145 lb 11.6 oz)  Body mass index is 20.91 kg/m².    Physical Exam  Constitutional:       Appearance: Normal appearance.   Cardiovascular:      Rate and Rhythm: Normal rate and regular rhythm.   Pulmonary:      Effort: No respiratory distress.      Comments: End exp wheeze left > right.  +UAN and rhonchi which clear with coughing  Abdominal:      General: Abdomen is flat.      Palpations: Abdomen is soft.   Skin:     Comments: Hyperemic cheeks   Neurological:      Mental Status: He is alert and oriented to person, place, and time. Mental status is at baseline.   Psychiatric:         Mood and Affect: Mood normal.         Behavior: Behavior normal.           Significant Labs: All pertinent labs within the past 24 hours have been reviewed.  A1C: No results for input(s): HGBA1C in the last 4320 hours.  ABGs: No results for input(s): PH, PCO2, HCO3, POCSATURATED, BE, TOTALHB, COHB, METHB, O2HB, POCFIO2, PO2 in the last 48 hours.  Bilirubin:   Recent Labs   Lab 03/12/23  2019   BILITOT 0.6       BMP:   Recent Labs   Lab 03/17/23 0548         K 4.2   CL 94*   CO2 35*   BUN 16   CREATININE 0.6   CALCIUM 8.6*       CBC:   Recent Labs   Lab 03/17/23  0548   WBC 25.46*   HGB 13.3*   HCT 40.2          CMP:   Recent Labs   Lab 03/17/23  0548      K 4.2   CL 94*   CO2 35*      BUN 16    CREATININE 0.6   CALCIUM 8.6*   ANIONGAP 8       Cardiac Markers:   No results for input(s): CKMB, MYOGLOBIN, BNP, TROPISTAT in the last 48 hours.    Coagulation: No results for input(s): PT, INR, APTT in the last 48 hours.  Lactic Acid:   No results for input(s): LACTATE in the last 48 hours.    Lipase: No results for input(s): LIPASE in the last 48 hours.  Lipid Panel: No results for input(s): CHOL, HDL, LDLCALC, TRIG, CHOLHDL in the last 48 hours.  Magnesium: No results for input(s): MG in the last 48 hours.  Troponin:   No results for input(s): TROPONINI, TROPONINIHS in the last 48 hours.    TSH: No results for input(s): TSH in the last 4320 hours.  Urine Culture: No results for input(s): LABURIN in the last 48 hours.  Urine Studies: No results for input(s): COLORU, APPEARANCEUA, PHUR, SPECGRAV, PROTEINUA, GLUCUA, KETONESU, BILIRUBINUA, OCCULTUA, NITRITE, UROBILINOGEN, LEUKOCYTESUR, RBCUA, WBCUA, BACTERIA, SQUAMEPITHEL, HYALINECASTS in the last 48 hours.    Invalid input(s): WRIGHTSUR    Significant Imaging: I have reviewed all pertinent imaging results/findings within the past 24 hours.    CXR   Cardiac silhouette is within normal limits.     Bilateral interstitial and mild patchy/nodular alveolar infiltrates may be associated with pneumonitis.  This is improved at the left lung base though slightly increased in the right lung.  Follow-up recommended.     Left lower lobe cavitary lesion is better visualized on the prior CT.  Possible underlying bilateral small pulmonary nodules.  See prior CT report.     No effusion or pneumothorax.  No lobar evidence of lobar consolidation.  No acute osseous abnormality.     Emphysematous changes.     EKG  With ST change in II, III, AVF and V3       Assessment/Plan:      * COPD exacerbation  Reason for admission  CXR: lower lobe cavitary lesion.  No lobar consolidation.  Bilateral interstitial patchy infiltrates. Left worse than right.  Emphysematous changes.    Scheduled  nebulizer tx, IV levofloxacin and IV solumedrol 80mg q6.  Supportive care.      3/17 Decreased IV steroids and plan for discharge tomorrow.      3/18will send home on medrol dose pack. He is intolerant of prednisone. Discussed continued duonebs scheduled with PRN albuterol. mucinex and medrol sent to the pharmacy.    Chronic respiratory failure  On home o2. Back to baseline.    Leukocytosis  Treating for COPD pneumonia and currently on high dose steroids   U/A negative for UTI   Blood culture NG  On steroids     3/18  Completed 5 days of azithromycin. This is most c/w corticosteroid induced leukocytosis. Afebrile. Improved dyspnea and on home o2.      Lung nodule  Plan for PET next week      PET scheduled for Wednesday.    Tobacco dependence  Nicotine patch       Bilateral low back pain with sciatica    Continue home tramadol and gabapentin     Essential hypertension  Continue norvasc and HCTZ.      132/72  He is HD stable.    Pain in the Chest  Intermittent spasm pain with movement and rest   ST abnormalities in leads II, III, AVF and v3  Troponin and d dimer wnl  Cardiology consulted no ACS         VTE Risk Mitigation (From admission, onward)         Ordered     IP VTE LOW RISK PATIENT  Once         03/13/23 0014     Place CHIKI hose  Until discontinued         03/13/23 0014                Discharge Planning   SELVIN:      Code Status: Full Code   Is the patient medically ready for discharge?:     Reason for patient still in hospital (select all that apply): Pending disposition  Discharge Plan A: Home with family                  Rocio Rider MD  Department of Hospital Medicine   [place] [facility] Surg (3rd Fl)

## 2023-03-18 NOTE — SUBJECTIVE & OBJECTIVE
Review of Systems   Constitutional:  Negative for chills, fatigue and fever.   Respiratory:  Positive for cough and shortness of breath. Negative for chest tightness.    Cardiovascular:  Negative for chest pain (intermittent spasm), palpitations and leg swelling.   Gastrointestinal:  Negative for constipation, diarrhea, nausea and vomiting.   Genitourinary:  Negative for difficulty urinating and dysuria.   Musculoskeletal:  Positive for arthralgias. Negative for gait problem.   Skin:  Negative for pallor and rash.   Neurological:  Negative for weakness and numbness.   Psychiatric/Behavioral:  Negative for agitation and confusion.    Objective:     Vital Signs (Most Recent):  Temp: 97.8 °F (36.6 °C) (03/18/23 0722)  Pulse: 67 (03/18/23 0800)  Resp: (!) 24 (03/18/23 0722)  BP: 132/72 (03/18/23 0722)  SpO2: 100 % (03/18/23 0722)   Vital Signs (24h Range):  Temp:  [97.8 °F (36.6 °C)-98.7 °F (37.1 °C)] 97.8 °F (36.6 °C)  Pulse:  [63-79] 67  Resp:  [16-24] 24  SpO2:  [94 %-100 %] 100 %  BP: (115-135)/(64-75) 132/72     Weight: 66.1 kg (145 lb 11.6 oz)  Body mass index is 20.91 kg/m².    Physical Exam  Constitutional:       Appearance: Normal appearance.   Cardiovascular:      Rate and Rhythm: Normal rate and regular rhythm.   Pulmonary:      Effort: No respiratory distress.      Comments: End exp wheeze left > right.  +UAN and rhonchi which clear with coughing  Abdominal:      General: Abdomen is flat.      Palpations: Abdomen is soft.   Skin:     Comments: Hyperemic cheeks   Neurological:      Mental Status: He is alert and oriented to person, place, and time. Mental status is at baseline.   Psychiatric:         Mood and Affect: Mood normal.         Behavior: Behavior normal.           Significant Labs: All pertinent labs within the past 24 hours have been reviewed.  A1C: No results for input(s): HGBA1C in the last 4320 hours.  ABGs: No results for input(s): PH, PCO2, HCO3, POCSATURATED, BE, TOTALHB, COHB, METHB,  O2HB, POCFIO2, PO2 in the last 48 hours.  Bilirubin:   Recent Labs   Lab 03/12/23  2019   BILITOT 0.6       BMP:   Recent Labs   Lab 03/17/23  0548         K 4.2   CL 94*   CO2 35*   BUN 16   CREATININE 0.6   CALCIUM 8.6*       CBC:   Recent Labs   Lab 03/17/23  0548   WBC 25.46*   HGB 13.3*   HCT 40.2          CMP:   Recent Labs   Lab 03/17/23  0548      K 4.2   CL 94*   CO2 35*      BUN 16   CREATININE 0.6   CALCIUM 8.6*   ANIONGAP 8       Cardiac Markers:   No results for input(s): CKMB, MYOGLOBIN, BNP, TROPISTAT in the last 48 hours.    Coagulation: No results for input(s): PT, INR, APTT in the last 48 hours.  Lactic Acid:   No results for input(s): LACTATE in the last 48 hours.    Lipase: No results for input(s): LIPASE in the last 48 hours.  Lipid Panel: No results for input(s): CHOL, HDL, LDLCALC, TRIG, CHOLHDL in the last 48 hours.  Magnesium: No results for input(s): MG in the last 48 hours.  Troponin:   No results for input(s): TROPONINI, TROPONINIHS in the last 48 hours.    TSH: No results for input(s): TSH in the last 4320 hours.  Urine Culture: No results for input(s): LABURIN in the last 48 hours.  Urine Studies: No results for input(s): COLORU, APPEARANCEUA, PHUR, SPECGRAV, PROTEINUA, GLUCUA, KETONESU, BILIRUBINUA, OCCULTUA, NITRITE, UROBILINOGEN, LEUKOCYTESUR, RBCUA, WBCUA, BACTERIA, SQUAMEPITHEL, HYALINECASTS in the last 48 hours.    Invalid input(s): WRIGHTSUR    Significant Imaging: I have reviewed all pertinent imaging results/findings within the past 24 hours.    CXR   Cardiac silhouette is within normal limits.     Bilateral interstitial and mild patchy/nodular alveolar infiltrates may be associated with pneumonitis.  This is improved at the left lung base though slightly increased in the right lung.  Follow-up recommended.     Left lower lobe cavitary lesion is better visualized on the prior CT.  Possible underlying bilateral small pulmonary nodules.  See prior  CT report.     No effusion or pneumothorax.  No lobar evidence of lobar consolidation.  No acute osseous abnormality.     Emphysematous changes.     EKG  With ST change in II, III, AVF and V3

## 2023-03-18 NOTE — ASSESSMENT & PLAN NOTE
Treating for COPD pneumonia and currently on high dose steroids   U/A negative for UTI   Blood culture NG  On steroids     3/18  Completed 5 days of azithromycin. This is most c/w corticosteroid induced leukocytosis. Afebrile. Improved dyspnea and on home o2.

## 2023-03-20 NOTE — PROGRESS NOTES
C3 nurse attempted to contact Brando Culp Jr.  for a TCC post hospital discharge follow up call. The patient is unable to conduct the call @ this time. The patient requested a callback.    The patient has a scheduled [facility] appointment with Redd Vázquez  on 3/23/23 @ 1000. Message sent to Physician staff.

## 2023-03-20 NOTE — PROGRESS NOTES
2nd Attempt made to reach patient for TCC call. Left verbal details with patient daughter who states she will have patient call [phone] leave first name, last name, and  for Meagan I will return your call.

## 2023-03-21 NOTE — PROGRESS NOTES
C3 nurse spoke with Brando Culp Jr.  for a TCC post hospital discharge follow up call. The patient has a scheduled [facility] appointment with Redd Vázquez on 3/23/23 @ 1000.

## 2023-03-23 PROBLEM — J96.11 CHRONIC RESPIRATORY FAILURE WITH HYPOXIA: Chronic | Status: ACTIVE | Noted: 2023-01-01

## 2023-04-14 NOTE — ED TRIAGE NOTES
Pt arrived to ED c/o generalized weakness, dizziness, and decreased appetite x 2 day. Pt reports hx of COPD. Pt's spO2 in triage 98% on 3 L of oxygen (pt wears oxygen at home).

## 2023-04-15 NOTE — ED NOTES
Patient placed on cardiac monitor and continuous pulse oximetry. Pt states that he is scared to be at home because he feels weak and gets SOB with movement. Pt also reports having no appetite lately.

## 2023-04-15 NOTE — ED PROVIDER NOTES
Encounter Date: 4/14/2023       History     Chief Complaint   Patient presents with    Fatigue     Pt arrived to ED c/o generalized weakness, dizziness, and decreased appetite x 2 day. Pt reports hx of COPD. Pt's spO2 in triage 98% on 3 L of oxygen (pt wears oxygen at home).      63 YO MALE WHO COMES IN TODAY DUE TO GENERALIZED WEAKNESS.  HE STATES THAT THIS HAS BEEN PRESENT TIMES THE LAST TWO DAYS.  THE PATIENT DOES HAVE A HISTORY OF SEVERE COPD FOR WHICH HE SEES A PULMONOLOGIST.  HE DOES CONTINUE TO SMOKE.  HE USES OXYGEN VIA 3 LITERS NASAL CANNULA AT ALL TIMES.  ON EVALUATION, THE PATIENT DOES HAVE DIFFUSE INSPIRATORY/EXPIRATORY WHEEZING.        Review of patient's allergies indicates:  No Known Allergies  Past Medical History:   Diagnosis Date    Abnormal CT scan     Arthritis     COPD (chronic obstructive pulmonary disease)     Hypertension      Past Surgical History:   Procedure Laterality Date    BRONCHOALVEOLAR LAVAGE N/A 1/18/2023    Procedure: LAVAGE, BRONCHOALVEOLAR;  Surgeon: rDiss Bartholomew MD;  [facility];  Service: Pulmonary;  Laterality: N/A;    BRONCHOSCOPY WITH BIOPSY Bilateral 1/18/2023    Procedure: BRONCHOSCOPY, WITH BIOPSY;  Surgeon: Driss Bartholomew MD;  Location: Critical [facility] hospital;  Service: Pulmonary;  Laterality: Bilateral;  site: lungs    BRONCHOSCOPY, WITH BRUSH BIOPSY Bilateral 1/18/2023    Procedure: BRONCHOSCOPY, WITH BRUSH BIOPSY;  Surgeon: Driss Bartholomew MD;  Location: Critical access hospital;  Service: Pulmonary;  Laterality: Bilateral;    COLONOSCOPY      COLONOSCOPY N/A 8/21/2017    Procedure: COLONOSCOPY;  Surgeon: Jessica Fink MD;  Location: [facility];  Service: Endoscopy;  Laterality: N/A;     Family History   Problem Relation Age of Onset    Emphysema Mother     Heart failure Father      Social History     Tobacco Use    Smoking status: Every Day     Packs/day: 0.50     Years: 37.00     Pack years: 18.50     Types: Cigarettes     Start date: 4/8/1979     Smokeless tobacco: Never    Tobacco comments:        Substance Use Topics    Alcohol use: Yes     Alcohol/week: 0.0 standard drinks     Comment: once a week    Drug use: No     Review of Systems   Constitutional: Negative.    HENT: Negative.     Eyes: Negative.    Respiratory:  Positive for shortness of breath and wheezing.    Cardiovascular: Negative.    Gastrointestinal: Negative.    Genitourinary: Negative.    Musculoskeletal: Negative.    Skin:  Positive for pallor.   Neurological: Negative.    Hematological: Negative.    Psychiatric/Behavioral: Negative.       Physical Exam     Initial Vitals [04/14/23 1854]   BP Pulse Resp Temp SpO2   (!) 148/81 80 (!) 22 97.8 °F (36.6 °C) 98 %      MAP       --         Physical Exam    Nursing note and vitals reviewed.  Constitutional:   CACHECTIC    HENT:   Head: Normocephalic and atraumatic.   Eyes: EOM are normal. Pupils are equal, round, and reactive to light.   Neck: Neck supple.   Normal range of motion.  Cardiovascular:  Normal rate, regular rhythm and normal heart sounds.           Pulmonary/Chest: He has wheezes.   Abdominal: Abdomen is soft.   Musculoskeletal:         General: Normal range of motion.      Cervical back: Normal range of motion and neck supple.     Neurological: He is alert.   Skin: Skin is warm.   Psychiatric: He has a normal mood and affect.       ED Course   Procedures  Labs Reviewed   CBC W/ AUTO DIFFERENTIAL - Abnormal; Notable for the following components:       Result Value    WBC 13.91 (*)     RBC 4.57 (*)     MCH 31.5 (*)     Platelets 599 (*)     MPV 8.9 (*)     Immature Granulocytes 1.2 (*)     Gran # (ANC) 11.6 (*)     Immature Grans (Abs) 0.17 (*)     Gran % 83.4 (*)     Lymph % 12.4 (*)     Mono % 2.4 (*)     All other components within normal limits   COMPREHENSIVE METABOLIC PANEL - Abnormal; Notable for the following components:    Chloride 93 (*)     CO2 30 (*)     BUN 4 (*)     All other components within normal limits   INFLUENZA A  & B BY MOLECULAR   TROPONIN I   B-TYPE NATRIURETIC PEPTIDE   MAGNESIUM   SARS-COV-2 RNA AMPLIFICATION, QUAL   URINALYSIS, REFLEX TO URINE CULTURE     EKG Readings: (Independently Interpreted)   EKG REVEALED NSR WITH A RATE OF 73.  NO ACUTE ST OR T WAVE CHANGES.      Imaging Results              X-Ray Chest AP Portable (Final result)  Result time 04/14/23 19:41:01      Final result by Arlyn Bo MD (04/14/23 19:41:01)                   Impression:      As above.      Electronically signed by: Arlyn Bo  Date:    04/14/2023  Time:    19:41               Narrative:    EXAMINATION:  XR CHEST AP PORTABLE    CLINICAL HISTORY:  Dyspnea, unspecified    TECHNIQUE:  Single frontal view of the chest was performed.    COMPARISON:  None    FINDINGS:  Hyperinflated lungs suggestive of obstructive lung disease.  Redemonstration of a small cavitary lesion in the left lower lobe.Mild patchy bilateral multifocal airspace disease.  No pleural effusion.  Normal heart size.                                    X-Rays:   Independently Interpreted Readings:   Other Readings:  CHEST RADIOGRAPH IS PENDING.   Medications   magnesium sulfate 2g in water 50mL IVPB (premix) (2 g Intravenous New Bag 4/14/23 1949)   methylPREDNISolone sodium succinate injection 125 mg (125 mg Intravenous Given 4/14/23 1948)   albuterol-ipratropium 2.5 mg-0.5 mg/3 mL nebulizer solution 3 mL (3 mLs Nebulization Given 4/14/23 2018)     Medical Decision Making:   Differential Diagnosis:   COPD EXACERBATION, LUNG CANCER, STEMI, NSTEMI, PNEUMONIA, CONGESTIVE HEART FAILURE  ED Management:  61 YO MALE WHO COMES IN TODAY DUE TO GENERALIZED WEAKNESS AND SHORTNESS OF BREATH.    THE PATIENT DOES HAVE A HISTORY OF SEVERE COPD WITH HOME OXYGEN USE.  HE DOES SEE A PULMONOLOGIST.    HOWEVER, HE CONTINUES TO SMOKE.  HIS EVALUATION IS PENDING.     ED EVALUATION DID REVEAL COPD.  THE PATIENT STATES THAT HE CAN'T GIVE A URINE SAMPLE.  PLAN TO DISCHARGE  THE PATIENT  HOME.  THE PATIENT DOES HAVE HOME OXYGEN IN ADDITION TO HIS MEDICATIONS FOR COPD.  THE PATIENT AND   FAMILY ARE AWARE OF THE PLAN OF CARE.  THE PATIENT IS APPROPRIATE FOR OUTPATIENT CARE.  HE DOESN'T APPEAR TO NEED TO BE  ADMITTED.                          Clinical Impression:   Final diagnoses:  [R06.00] Dyspnea  [R53.1] Generalized weakness (Primary)  [J44.9] Chronic obstructive pulmonary disease, unspecified COPD type        ED Disposition Condition    Discharge Stable          ED Prescriptions    None       Follow-up Information       Follow up With Specialties Details Why Contact Info    [facility] - Emergency Dept Emergency Medicine  If symptoms worsen [address] [place] [id number]  [phone]             Leidy Miller MD  04/14/23 0928

## 2023-04-24 PROBLEM — Z99.81 ON HOME OXYGEN THERAPY: Status: ACTIVE | Noted: 2023-01-01

## 2023-04-24 PROBLEM — M25.531 RIGHT WRIST PAIN: Status: RESOLVED | Noted: 2018-06-22 | Resolved: 2023-01-01

## 2023-04-24 PROBLEM — L08.9 LOCAL SKIN INFECTION: Status: RESOLVED | Noted: 2018-03-09 | Resolved: 2023-01-01

## 2023-06-26 PROBLEM — J96.11 CHRONIC RESPIRATORY FAILURE WITH HYPOXIA: Chronic | Status: RESOLVED | Noted: 2023-01-01 | Resolved: 2023-01-01

## 2023-07-23 PROBLEM — E78.5 HLD (HYPERLIPIDEMIA): Status: ACTIVE | Noted: 2023-01-01

## 2023-07-23 PROBLEM — J96.11 CHRONIC HYPOXEMIC RESPIRATORY FAILURE: Status: ACTIVE | Noted: 2023-01-01

## 2023-07-25 PROBLEM — R09.02 HYPOXIA: Status: ACTIVE | Noted: 2023-01-01

## 2023-07-26 PROBLEM — J96.12 CHRONIC RESPIRATORY FAILURE WITH HYPOXIA AND HYPERCAPNIA: Status: ACTIVE | Noted: 2023-01-01

## 2023-07-26 PROBLEM — R04.0 POSTERIOR EPISTAXIS: Status: ACTIVE | Noted: 2023-01-01

## 2023-07-26 PROBLEM — R09.02 HYPOXIA: Status: RESOLVED | Noted: 2023-01-01 | Resolved: 2023-01-01

## 2023-07-26 PROBLEM — D72.829 LEUKOCYTOSIS: Status: RESOLVED | Noted: 2023-01-01 | Resolved: 2023-01-01

## 2023-07-27 NOTE — HPI
Brando Culp Jr. Is a 64 yo M with PMH of HTN, stage IV COPD on 3L NC at home, HTN, cavitary lesion of the lung, and HLD, presenting from OSH for ENT evaluation for persistent epistaxis. He originially presented for worsening SOB with dyspnea just getting out of bed and required BIPAP and was admitted to the ICU. COPD exacerbation was improving s/p abx, steroids, and weaned back to home oxygen requirements when he had nasal hemorrhage 7/26/2023 concerning for posterior bleed with clots in back of oropharynx and failure of external hemostasis and rhino rocket to resolve bleeding. Vitals and hemoglobin stable but transferred for higher level of care. [place] [facility] [place] [place].

## 2023-07-27 NOTE — ASSESSMENT & PLAN NOTE
Presented to OSH with SOB thought to be related to a COPD exacerbation. Patient is stage IV and DNR/DNI but would be amenable to intubation if needed for airway protection if epistaxis is fixable and he requires airway protection.     - Continue 3L NC  - SpO2 goal 88-92%  - Scheduled albuterol   - Respiratory culture from 7/23/2023 showed normal chelle and moraxella catarrhalis.  - Was receiving vancomycin and zosyn. Had also received one dose of Levaquin on arrival and one dose of unasyn prior to transfer. Will switch to rocephin and azithromycin.  - Continue prednisone 40 mg to complete 5 day course   - Continue Breo

## 2023-07-27 NOTE — ASSESSMENT & PLAN NOTE
CT Chest showed LORY multiple spiculated subcentimeter to 1.7 cm malignant-appearing pulmonary nodules.  2 cm noncalcified nodule also seen in right middle calcified granulomas also seen within the upper lobe.  Large cavitary mass in left lower lobe has increased in size.    - AFB at OSH negative for stain. Culture pending.     Per Provider Transfer Summary:      Left lower lobe cavitary lesion has been present since at least December of 2022.  PET scan in March 2023 showed hypermetabolic activity in left upper lobe nodule, hypermetabolic activity in the medial margin of the right middle lobe opacity, and moderate peripheral activity in the cavitary lesion in the left lung base.    -sputum AFB cultures in September 2022 had no growth.  -bronchoscopy AFB cultures in January 2023 had no growth.  Lung biopsy and brushings from the bronchoscopy were also negative for malignancy.

## 2023-07-27 NOTE — SUBJECTIVE & OBJECTIVE
Review of Systems   Constitutional:  Positive for fatigue. Negative for fever.   HENT:  Positive for nosebleeds. Negative for trouble swallowing.    Eyes:  Negative for pain and visual disturbance.   Respiratory:  Positive for shortness of breath and wheezing. Negative for chest tightness.    Cardiovascular:  Negative for chest pain and palpitations.   Gastrointestinal:  Negative for abdominal distention and abdominal pain.   Genitourinary:  Negative for decreased urine volume.   Musculoskeletal:  Negative for arthralgias and back pain.   Skin:  Positive for color change.             Neurological:  Positive for weakness and numbness. Negative for headaches.   Hematological:  Bruises/bleeds easily.   Psychiatric/Behavioral:  Negative for agitation, behavioral problems and confusion.    Objective:     Vital Signs (Most Recent):  Temp: 97.5 °F (36.4 °C) (07/27/23 0705)  Pulse: 72 (07/27/23 1320)  Resp: (!) 23 (07/27/23 1320)  BP: 131/62 (07/27/23 1300)  SpO2: 100 % (07/27/23 1320) Vital Signs (24h Range):  Temp:  [97.5 °F (36.4 °C)-98.2 °F (36.8 °C)] 97.5 °F (36.4 °C)  Pulse:  [] 72  Resp:  [15-41] 23  SpO2:  [87 %-100 %] 100 %  BP: ()/() 131/62   Weight: 57.3 kg (126 lb 5.2 oz)  Body mass index is 18.13 kg/m².      Intake/Output Summary (Last 24 hours) at 7/27/2023 1345  Last data filed at 7/27/2023 1101  Gross per 24 hour   Intake 1067.12 ml   Output 1400 ml   Net -332.88 ml          Physical Exam  Constitutional:       General: He is not in acute distress.     Appearance: He is ill-appearing.   HENT:      Head: Normocephalic and atraumatic.      Nose:      Comments: Admitted for significant epistaxis, not bleeding upon examination     Mouth/Throat:      Comments: Wearing humidified facemask  Eyes:      General: No scleral icterus.     Extraocular Movements: Extraocular movements intact.   Cardiovascular:      Rate and Rhythm: Normal rate and regular rhythm.      Pulses: Normal pulses.      Heart  sounds: Normal heart sounds.   Pulmonary:      Effort: Respiratory distress present.      Breath sounds: Wheezing and rhonchi present.   Chest:      Chest wall: No tenderness.   Abdominal:      General: Abdomen is flat. Bowel sounds are normal.      Palpations: Abdomen is soft.   Musculoskeletal:         General: Normal range of motion.      Cervical back: Normal range of motion.   Skin:     General: Skin is warm and dry.      Findings: Bruising and erythema present.   Neurological:      General: No focal deficit present.      Mental Status: He is alert and oriented to person, place, and time.   Psychiatric:         Mood and Affect: Mood normal.         Behavior: Behavior normal.          Vents:  Oxygen Concentration (%): 80 (07/27/23 1005)  Lines/Drains/Airways       Peripheral Intravenous Line  Duration                  Peripheral IV - Single Lumen 07/23/23 1927 20 G Left Forearm 3 days         Peripheral IV - Single Lumen 07/26/23 0915 18 G Right Forearm 1 day                  Significant Labs:    CBC/Anemia Profile:  Recent Labs   Lab 07/26/23  0429 07/26/23  1124 07/26/23  2118 07/27/23  0226   WBC 16.24*  --  22.93* 22.93*   HGB 11.0* 11.5* 9.1* 8.5*   HCT 34.0* 34.6* 28.0* 25.8*     --  410 342   MCV 99*  --  97 97   RDW 13.9  --  13.4 13.5        Chemistries:  Recent Labs   Lab 07/26/23 0429 07/27/23 0226   * 135*   K 3.7 4.3   CL 90* 93*   CO2 37* 31*   BUN 8 25*   CREATININE 0.6 0.6   CALCIUM 8.8 8.4*   ALBUMIN  --  3.0*   PROT  --  4.9*   BILITOT  --  0.3   ALKPHOS  --  40*   ALT  --  12   AST  --  14   MG  --  1.9   PHOS  --  3.6       All pertinent labs within the past 24 hours have been reviewed.    Significant Imaging:  I have reviewed all pertinent imaging results/findings within the past 24 hours.

## 2023-07-27 NOTE — PLAN OF CARE
Problem: Adult Inpa  MICU DAILY GOALS     Family/Goals of care/Code Status   Code Status: Partial Code    24H Vital Sign Range  Temp:  [97.5 °F (36.4 °C)-98.1 °F (36.7 °C)]   Pulse:  []   Resp:  [15-38]   BP: ()/()   SpO2:  [89 %-100 %]      Shift Events   No acute events throughout shift    AWAKE RASS: Goal - RASS Goal: 0-->alert and calm  Actual - RASS (Martinez Agitation-Sedation Scale): alert and calm    Restraint necessity: Not necessary   BREATHE SBT: NA    Coordinate A & B, analgesics/sedatives Pain: managed   SAT: NA   Delirium CAM-ICU: Overall CAM-ICU: Negative   Early(intubated/ Progressive (non-intubated) Mobility MOVE Screen: Pass   Activity: Activity Management: Patient unable to perform activities   Feeding/Nutrition Diet order: Diet/Nutrition Received: 2 gram sodium, low saturated fat/low cholesterol,     Thrombus DVT prophylaxis: VTE Required Core Measure: (SCDs) Sequential compression device initiated/maintained   HOB Elevation Head of Bed (HOB) Positioning: HOB at 45 degrees   Ulcer Prophylaxis GI: no   Glucose control managed     Skin Skin assessed during daily assessment:   Yes, No altered skin integrity present.   Bowel Function no issues    Indwelling Catheter Necessity          De-escalation Antibiotics Yes       VS and assessment per flow sheet, patient progressing towards goals as tolerated, plan of care reviewed with family, all concerns addressed, will continue to monitor. tient Plan of Care  Goal: Plan of Care Review  Outcome: Ongoing, Progressing  Flowsheets (Taken 7/27/2023 1852)  Plan of Care Reviewed With:   patient   daughter  Goal: Patient-Specific Goal (Individualized)  Outcome: Ongoing, Progressing  Flowsheets (Taken 7/27/2023 1852)  Anxieties, Fears or Concerns: being able to go home and just get around the house  Individualized Care Needs: reassurance, and motivbation to eat and drink  Goal: Optimal Comfort and Wellbeing  Outcome: Ongoing,  Progressing  Intervention: Monitor Pain and Promote Comfort  Flowsheets (Taken 7/27/2023 9067)  Pain Management Interventions:   care clustered   relaxation techniques promoted

## 2023-07-27 NOTE — ASSESSMENT & PLAN NOTE
Transferred from OSH for ENT review. Rhinorocket applied at outside hospital. ENT evaluated at bedside and recommend:    - right nare absorbable packing will auto-dissolve  - Afrin to bilateral nares q8h for the next 48h and PRN for bleeds with firm anterior pressure.  - May place mustache dressing under nose for trickling, ok if dressing becomes saturated with some red/brown drainage  - Nasal saline q4h while packing in place; with nightly application of vaseline/aquaphor to anterior nares  - Keep head of bed elevated  - Apply Afrin to affected nasal cavity if epistaxis recurs, please see detailed instructions below   - avoid nasal cannula. Humidified facemask oxygen if possible.  - Trend CBC. Type and screen sent. Transfuse if hgb < 7

## 2023-07-27 NOTE — HOSPITAL COURSE
Seen by ENT, recommending afrin and nasal saline during stay to alleviate epistaxis. Avoiding Nasal cannula use, only humidified facemask if needed (pt requiring). 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 AFB smear negative.CT significant for multiple malignant appearing nodules in both lobes, will need to follow up outpatient. Discussions took place with pt regarding starting Pulmonary rehab once outside the hospital.

## 2023-07-27 NOTE — ASSESSMENT & PLAN NOTE
Patient with Hypercapnic and Hypoxic Respiratory failure which is Chronic.  he is on home oxygen at 3 LPM. Supplemental oxygen was provided and noted- Oxygen Concentration (%):  [80] 80    Signs/symptoms of respiratory failure include- tachypnea and increased work of breathing. Contributing diagnoses includes - COPD Labs and images were reviewed.     Plan per COPD.

## 2023-07-27 NOTE — CONSULTS
[facility] - Card[facility] Medical ICU  Otorhinolaryngology-Head & Neck Surgery  Consult Note    Patient Name: Brando Culp Jr.  MRN: [id number]  Code Status: Full Code  Admission Date: 7/26/2023  Hospital Length of Stay: 0 days  Attending Physician: Juan Gross MD  Primary Care Provider: Dioni Matson MD    Patient information was obtained from patient, past medical records and ER records.     Inpatient consult to ENT  Consult performed by: Ashkan Malik MD  Consult ordered by: Roberto Qureshi MD        Subjective:     Chief Complaint/Reason for Admission: COPD exacerbation    History of Present Illness: 63M tx from OSH for epistaxis. Was admitted there for COPD exacerbation, on 3L [place] home O2. Developed R unilateral epistaxis after a sneeze. Rhinorocket was placed there, persistent oozing was noted, eventually it fell out and he continued to bleed. Currently has paper towel in R nare, on facemask. Feels blood running down his throat. Denies bleeding from L nare. Concerned that he cannot catch his breath if nose is obstructed. Cavitary lesions in lungs but AFB negative.       Medications:  Continuous Infusions:  Scheduled Meds:  PRN Meds:     Current Facility-Administered Medications on File Prior to Encounter   Medication    [COMPLETED] LORazepam injection 0.5 mg    [COMPLETED] morphine 12 hr tablet 15 mg    [COMPLETED] morphine injection 2 mg    [COMPLETED] oxymetazoline 0.05 % nasal spray 3 spray    [COMPLETED] vancomycin 1,500 mg in dextrose 5 % (D5W) 250 mL IVPB (Vial-Mate)    [DISCONTINUED] acetaminophen tablet 975 mg    [DISCONTINUED] albuterol-ipratropium 2.5 mg-0.5 mg/3 mL nebulizer solution 3 mL    [DISCONTINUED] ampicillin-sulbactam (UNASYN) 3 g in sodium chloride 0.9 % 100 mL IVPB (MB+)    [DISCONTINUED] atorvastatin tablet 40 mg    [DISCONTINUED] budesonide nebulizer solution 0.5 mg    [DISCONTINUED] busPIRone tablet 5 mg    [DISCONTINUED] carvediloL tablet 25 mg    [DISCONTINUED]  enoxaparin injection 40 mg    [DISCONTINUED] hydroCHLOROthiazide tablet 12.5 mg    [DISCONTINUED] HYDROcodone-acetaminophen  mg per tablet 1 tablet    [DISCONTINUED] LIDOcaine 5 % patch 2 patch    [DISCONTINUED] LIDOcaine HCl 2% oral solution 5 mL    [DISCONTINUED] melatonin tablet 9 mg    [DISCONTINUED] methylPREDNISolone sodium succinate injection 60 mg    [DISCONTINUED] morphine 10 mg/5 mL solution 15 mg    [DISCONTINUED] morphine 12 hr tablet 15 mg    [DISCONTINUED] morphine injection 4 mg    [DISCONTINUED] morphine tablet 15 mg    [DISCONTINUED] nicotine 14 mg/24 hr 1 patch    [DISCONTINUED] piperacillin-tazobactam (ZOSYN) 4.5 g in dextrose 5 % in water (D5W) 5 % 100 mL IVPB (MB+)    [DISCONTINUED] sodium chloride 0.9% flush 10 mL    [DISCONTINUED] traMADoL tablet 100 mg    [DISCONTINUED] vancomycin (VANCOCIN) 1,000 mg in dextrose 5 % (D5W) 250 mL IVPB (Vial-Mate)    [DISCONTINUED] vancomycin - pharmacy to dose    [DISCONTINUED] vancomycin - pharmacy to dose     Current Outpatient Medications on File Prior to Encounter   Medication Sig    albuterol (PROVENTIL) 2.5 mg /3 mL (0.083 %) nebulizer solution Take 3 mLs (2.5 mg total) by nebulization every 6 (six) hours as needed for Wheezing. Rescue    albuterol (PROVENTIL/VENTOLIN HFA) 90 mcg/actuation inhaler Inhale 1-2 puffs into the lungs every 6 (six) hours as needed for Wheezing. Rescue    budesonide-formoterol 80-4.5 mcg (SYMBICORT) 80-4.5 mcg/actuation HFAA Inhale 2 puffs into the lungs 2 (two) times daily.    busPIRone (BUSPAR) 5 MG Tab Take 1 tablet (5 mg total) by mouth 3 (three) times daily.    carvediloL (COREG) 25 MG tablet Take 1 tablet (25 mg total) by mouth 2 (two) times daily with meals.    hydroCHLOROthiazide (HYDRODIURIL) 12.5 MG Tab Take 1 tablet (12.5 mg total) by mouth once daily.    HYDROcodone-acetaminophen (NORCO)  mg per tablet Take 1 tablet by mouth every 4 (four) hours as needed for Pain.    ipratropium  (ATROVENT) 0.02 % nebulizer solution Take 2.5 mLs (500 mcg total) by nebulization 4 (four) times daily. Rescue    LIPITOR 40 mg tablet Take 1 tablet (40 mg total) by mouth once daily.    nicotine (NICODERM CQ) 7 mg/24 hr Place 1 patch onto the skin once daily.    predniSONE (DELTASONE) 10 MG tablet Take 4 tabs per day x 3 days, then Take 3 tabs per day x 3 days, then 2 tabs per day x 3 days, then 1 tab per day x 3 days, then stop. Take with food    PULMO-AIDE COMPRESSOR Eva use as directed    ramelteon (ROZEREM) 8 mg tablet Take 1 tablet (8 mg total) by mouth nightly.    tiotropium (SPIRIVA WITH HANDIHALER) 18 mcg inhalation capsule inhale 1 capsule into the lungs every day    [DISCONTINUED] tiotropium (SPIRIVA WITH HANDIHALER) 18 mcg inhalation capsule Inhale 1 capsule (18 mcg total) into the lungs once daily.       Review of patient's allergies indicates:  No Known Allergies    Past Medical History:   Diagnosis Date    Abnormal CT scan     Arthritis     COPD (chronic obstructive pulmonary disease)     Hypertension      Past Surgical History:   Procedure Laterality Date    BRONCHOALVEOLAR LAVAGE N/A 1/18/2023    Procedure: LAVAGE, BRONCHOALVEOLAR;  Surgeon: Driss Bartholomew MD;  Location: [facility];  Service: Pulmonary;  Laterality: N/A;    BRONCHOSCOPY WITH BIOPSY Bilateral 1/18/2023    Procedure: BRONCHOSCOPY, WITH BIOPSY;  Surgeon: Driss Bartholomew MD;  Location: [facility];  Service: Pulmonary;  Laterality: Bilateral;  site: lungs    BRONCHOSCOPY, WITH BRUSH BIOPSY Bilateral 1/18/2023    Procedure: BRONCHOSCOPY, WITH BRUSH BIOPSY;  Surgeon: Driss Bartholomew MD;  Location: [facility];  Service: Pulmonary;  Laterality: Bilateral;    COLONOSCOPY      COLONOSCOPY N/A 8/21/2017    Procedure: COLONOSCOPY;  Surgeon: Jessica Fink MD;  Location[facility];  Service: Endoscopy;  Laterality: N/A;     Family History       Problem Relation (Age of Onset)    Emphysema Mother     Heart failure Father          Tobacco Use    Smoking status: Every Day     Packs/day: 0.50     Years: 37.00     Pack years: 18.50     Types: Cigarettes     Start date: 4/8/1979    Smokeless tobacco: Never    Tobacco comments:        Substance and Sexual Activity    Alcohol use: Yes     Alcohol/week: 0.0 standard drinks     Comment: once a week    Drug use: No    Sexual activity: Not Currently     Review of Systems  Objective:     Vital Signs (Most Recent):  Pulse: (!) 118 (07/26/23 1840)  Resp: (!) 34 (07/26/23 1840)  SpO2: 100 % (07/26/23 1840) Vital Signs (24h Range):  Temp:  [96.7 °F (35.9 °C)-98.2 °F (36.8 °C)] 98.2 °F (36.8 °C)  Pulse:  [] 118  Resp:  [16-41] 34  SpO2:  [87 %-100 %] 100 %  BP: (133-177)/(70-93) 141/93        There is no height or weight on file to calculate BMI.         Physical Exam  Constitutional:       General: He is not in acute distress.     Appearance: He is diaphoretic. He is not toxic-appearing.   HENT:      Right Ear: External ear normal.      Left Ear: External ear normal.      Nose:      Comments: Missing 2x1 cm (AP x sagittal)of mucosa and cartilage from R caudal septum. Exposed cartilage posteriorly. Pulsating bleed from R floor of nasal passage. The contralateral mucosa is intact. Large clot and fresh blood in posterior OP.  Pulmonary:      Breath sounds: No stridor. Wheezing present.      Comments: Increased WOB. Cannot tolerate removal facemask for more than 3 minutes. Desats to 94 and improves to 99.        Significant Labs:  BMP:   Recent Labs   Lab 07/26/23 0429   GLU 70   CL 90*   CO2 37*   BUN 8   CREATININE 0.6   CALCIUM 8.8     CBC:   Recent Labs   Lab 07/26/23 0429 07/26/23  1124   WBC 16.24*  --    RBC 3.45*  --    HGB 11.0* 11.5*   HCT 34.0* 34.6*     --    MCV 99*  --    MCH 31.9*  --    MCHC 32.4  --        Significant Diagnostics:   CT Chest 7/23  2.  Multiple malignant-appearing pulmonary nodules in left upper and right middle lobe     3.   Enlarging thick wall cavitary mass in left lung base     Procedure: Control of epistaxis   Silver nitrate used to cauterize pulsatile bleeder at R floor of nose to a slow ooze. Packed R nasal passage with fibrillar (absorbable).  Suctioned clot from L nare until he endorsed airflow on that side. No bleeding from L nare.   Patient gargled and posterior oropharynx was dry after several minutes.   No bleeding was noted from R nare at the end of procedure.       Assessment/Plan:     Epistaxis  Do not suspect Posterior epistaxis given unilateral and anterior source observed. Likely current bleed mostly from rhinorocket trauma.  Expect to ooze for 48 hours.     - right nare absorbable packing will auto-dissolve  - Afrin to bilateral nares q8h for the next 48h and PRN for bleeds with firm anterior pressure.  - May place mustache dressing under nose for trickling, ok if dressing becomes saturated with some red/brown drainage  - Nasal saline q4h while packing in place; with nightly application of vaseline/aquaphor to anterior nares  - Keep head of bed elevated  - Apply Afrin to affected nasal cavity if epistaxis recurs, please see detailed instructions below   - avoid nasal cannula. Humidified facemask oxygen if possible.            PATIENT INSTRUCTIONS:    Nasal precautions  - DO NOT blow your nose for 2 weeks. The only exception is that you may lightly blow your nose after using a sinus rinse.  - Sneeze/cough with mouth open  - Avoid irritating substances that might make you sneeze, such as dust, chalk, harsh chemicals, and allergic triggers. This might also include spicy foods.  - Do not smoke   - Avoid flying or swimming for 2 weeks.    - Avoid all heavy lifting, straining or bending for 2 weeks.   - Avoid semi-contact sports or vigorous exercising for 3-4 weeks.      Aftercare/ moisturizing recommendations to decrease frequency of nosebleeds:  - Daily nasal saline sprays/rinses  - Nightly application of  vaseline/aquaphor to anterior nares  - Room humidification  - Avoidance of nasal cannula if possible (always with humidification and please use face tent, nasal cup, facemask if possible)  Management of medical conditions contributing to epistaxis (coagulopathy, hypertension, etc.)  - Humidification of CPAP appliance if applicable    If rebleeding occurs:   - Apply Afrin liberally to both nostrils  - Apply pressure over the soft part of the nose for 15 minutes (clip or digital pressure, important to have pressure over soft part of nose and consistent pressure (do not release pressure at any point))  - Instruct patient to lean forward, avoid swallowing blood. This can cause nausea and vomiting  - Can repeat these steps above up to 3 times  - Call/reconsult ENT or return to the nearest emergency department if this is unsuccessful              Thank you for your consult. I will sign off. Please contact us if you have any additional questions.  Please call if rebleeds after afrin and 15 minutes of anterior pressure    Ashkan Malik MD  Otorhinolaryngology-Head & Neck Surgery  [facility] - [facility]

## 2023-07-27 NOTE — SUBJECTIVE & OBJECTIVE
Medications:  Continuous Infusions:  Scheduled Meds:  PRN Meds:     Current Facility-Administered Medications on File Prior to Encounter   Medication    [COMPLETED] LORazepam injection 0.5 mg    [COMPLETED] morphine 12 hr tablet 15 mg    [COMPLETED] morphine injection 2 mg    [COMPLETED] oxymetazoline 0.05 % nasal spray 3 spray    [COMPLETED] vancomycin 1,500 mg in dextrose 5 % (D5W) 250 mL IVPB (Vial-Mate)    [DISCONTINUED] acetaminophen tablet 975 mg    [DISCONTINUED] albuterol-ipratropium 2.5 mg-0.5 mg/3 mL nebulizer solution 3 mL    [DISCONTINUED] ampicillin-sulbactam (UNASYN) 3 g in sodium chloride 0.9 % 100 mL IVPB (MB+)    [DISCONTINUED] atorvastatin tablet 40 mg    [DISCONTINUED] budesonide nebulizer solution 0.5 mg    [DISCONTINUED] busPIRone tablet 5 mg    [DISCONTINUED] carvediloL tablet 25 mg    [DISCONTINUED] enoxaparin injection 40 mg    [DISCONTINUED] hydroCHLOROthiazide tablet 12.5 mg    [DISCONTINUED] HYDROcodone-acetaminophen  mg per tablet 1 tablet    [DISCONTINUED] LIDOcaine 5 % patch 2 patch    [DISCONTINUED] LIDOcaine HCl 2% oral solution 5 mL    [DISCONTINUED] melatonin tablet 9 mg    [DISCONTINUED] methylPREDNISolone sodium succinate injection 60 mg    [DISCONTINUED] morphine 10 mg/5 mL solution 15 mg    [DISCONTINUED] morphine 12 hr tablet 15 mg    [DISCONTINUED] morphine injection 4 mg    [DISCONTINUED] morphine tablet 15 mg    [DISCONTINUED] nicotine 14 mg/24 hr 1 patch    [DISCONTINUED] piperacillin-tazobactam (ZOSYN) 4.5 g in dextrose 5 % in water (D5W) 5 % 100 mL IVPB (MB+)    [DISCONTINUED] sodium chloride 0.9% flush 10 mL    [DISCONTINUED] traMADoL tablet 100 mg    [DISCONTINUED] vancomycin (VANCOCIN) 1,000 mg in dextrose 5 % (D5W) 250 mL IVPB (Vial-Mate)    [DISCONTINUED] vancomycin - pharmacy to dose    [DISCONTINUED] vancomycin - pharmacy to dose     Current Outpatient Medications on File Prior to Encounter   Medication Sig    albuterol (PROVENTIL) 2.5 mg /3 mL (0.083 %)  nebulizer solution Take 3 mLs (2.5 mg total) by nebulization every 6 (six) hours as needed for Wheezing. Rescue    albuterol (PROVENTIL/VENTOLIN HFA) 90 mcg/actuation inhaler Inhale 1-2 puffs into the lungs every 6 (six) hours as needed for Wheezing. Rescue    budesonide-formoterol 80-4.5 mcg (SYMBICORT) 80-4.5 mcg/actuation HFAA Inhale 2 puffs into the lungs 2 (two) times daily.    busPIRone (BUSPAR) 5 MG Tab Take 1 tablet (5 mg total) by mouth 3 (three) times daily.    carvediloL (COREG) 25 MG tablet Take 1 tablet (25 mg total) by mouth 2 (two) times daily with meals.    hydroCHLOROthiazide (HYDRODIURIL) 12.5 MG Tab Take 1 tablet (12.5 mg total) by mouth once daily.    HYDROcodone-acetaminophen (NORCO)  mg per tablet Take 1 tablet by mouth every 4 (four) hours as needed for Pain.    ipratropium (ATROVENT) 0.02 % nebulizer solution Take 2.5 mLs (500 mcg total) by nebulization 4 (four) times daily. Rescue    LIPITOR 40 mg tablet Take 1 tablet (40 mg total) by mouth once daily.    nicotine (NICODERM CQ) 7 mg/24 hr Place 1 patch onto the skin once daily.    predniSONE (DELTASONE) 10 MG tablet Take 4 tabs per day x 3 days, then Take 3 tabs per day x 3 days, then 2 tabs per day x 3 days, then 1 tab per day x 3 days, then stop. Take with food    PULMO-AIDE COMPRESSOR Eva use as directed    ramelteon (ROZEREM) 8 mg tablet Take 1 tablet (8 mg total) by mouth nightly.    tiotropium (SPIRIVA WITH HANDIHALER) 18 mcg inhalation capsule inhale 1 capsule into the lungs every day    [DISCONTINUED] tiotropium (SPIRIVA WITH HANDIHALER) 18 mcg inhalation capsule Inhale 1 capsule (18 mcg total) into the lungs once daily.       Review of patient's allergies indicates:  No Known Allergies    Past Medical History:   Diagnosis Date    Abnormal CT scan     Arthritis     COPD (chronic obstructive pulmonary disease)     Hypertension      Past Surgical History:   Procedure Laterality Date    BRONCHOALVEOLAR LAVAGE N/A 1/18/2023     Procedure: LAVAGE, BRONCHOALVEOLAR;  Surgeon: Driss Bartholomew MD;  Location: [facility];  Service: Pulmonary;  Laterality: N/A;    BRONCHOSCOPY WITH BIOPSY Bilateral 1/18/2023    Procedure: BRONCHOSCOPY, WITH BIOPSY;  Surgeon: Driss Bartholomew MD;  Location: [facility];  Service: Pulmonary;  Laterality: Bilateral;  site: lungs    BRONCHOSCOPY, WITH BRUSH BIOPSY Bilateral 1/18/2023    Procedure: BRONCHOSCOPY, WITH BRUSH BIOPSY;  Surgeon: Driss Bartholomew MD;  Location: [facility];  Service: Pulmonary;  Laterality: Bilateral;    COLONOSCOPY      COLONOSCOPY N/A 8/21/2017    Procedure: COLONOSCOPY;  Surgeon: Jessica Fink MD;  Location: [facility];  Service: Endoscopy;  Laterality: N/A;     Family History       Problem Relation (Age of Onset)    Emphysema Mother    Heart failure Father          Tobacco Use    Smoking status: Every Day     Packs/day: 0.50     Years: 37.00     Pack years: 18.50     Types: Cigarettes     Start date: 4/8/1979    Smokeless tobacco: Never    Tobacco comments:        Substance and Sexual Activity    Alcohol use: Yes     Alcohol/week: 0.0 standard drinks     Comment: once a week    Drug use: No    Sexual activity: Not Currently     Review of Systems  Objective:     Vital Signs (Most Recent):  Pulse: (!) 118 (07/26/23 1840)  Resp: (!) 34 (07/26/23 1840)  SpO2: 100 % (07/26/23 1840) Vital Signs (24h Range):  Temp:  [96.7 °F (35.9 °C)-98.2 °F (36.8 °C)] 98.2 °F (36.8 °C)  Pulse:  [] 118  Resp:  [16-41] 34  SpO2:  [87 %-100 %] 100 %  BP: (133-177)/(70-93) 141/93        There is no height or weight on file to calculate BMI.         Physical Exam  Constitutional:       General: He is not in acute distress.     Appearance: He is diaphoretic. He is not toxic-appearing.   HENT:      Right Ear: External ear normal.      Left Ear: External ear normal.      Nose:      Comments: Missing 2x1 cm (AP x sagittal)of mucosa and cartilage from R caudal septum. Exposed cartilage  posteriorly. Pulsating bleed from R floor of nasal passage. The contralateral mucosa is intact. Large clot and fresh blood in posterior OP.  Pulmonary:      Breath sounds: No stridor. Wheezing present.      Comments: Increased WOB. Cannot tolerate removal facemask for more than 3 minutes. Desats to 94 and improves to 99.        Significant Labs:  BMP:   Recent Labs   Lab 07/26/23 0429   GLU 70   CL 90*   CO2 37*   BUN 8   CREATININE 0.6   CALCIUM 8.8     CBC:   Recent Labs   Lab 07/26/23 0429 07/26/23  1124   WBC 16.24*  --    RBC 3.45*  --    HGB 11.0* 11.5*   HCT 34.0* 34.6*     --    MCV 99*  --    MCH 31.9*  --    MCHC 32.4  --        Significant Diagnostics:   CT Chest 7/23  2.  Multiple malignant-appearing pulmonary nodules in left upper and right middle lobe     3.  Enlarging thick wall cavitary mass in left lung base     Procedure: Control of epistaxis   Silver nitrate used to cauterize pulsatile bleeder at R floor of nose to a slow ooze. Packed R nasal passage with fibrillar (absorbable).  Suctioned clot from L nare until he endorsed airflow on that side. No bleeding from L nare.   Patient gargled and posterior oropharynx was dry after several minutes.   No bleeding was noted from R nare at the end of procedure.

## 2023-07-27 NOTE — PROGRESS NOTES
[facility] - Cardiac Medical ICU  Critical Care Medicine  Progress Note    Patient Name: Brando Culp Jr.  MRN: [id number]  Admission Date: 7/26/2023  Hospital Length of Stay: 1 days  Code Status: Partial Code  Attending Provider: Juan Gross MD  Primary Care Provider: Dioni Matson MD   Principal Problem: Epistaxis    Subjective:     HPI:  Brando Culp Jr. Is a 62 yo M with PMH of HTN, stage IV COPD on 3L NC at home, HTN, cavitary lesion of the lung, and HLD, presenting from OSH for ENT evaluation for persistent epistaxis. He originially presented for worsening SOB with dyspnea just getting out of bed and required BIPAP and was admitted to the ICU. COPD exacerbation was improving s/p abx, steroids, and weaned back to home oxygen requirements when he had nasal hemorrhage 7/26/2023 concerning for posterior bleed with clots in back of oropharynx and failure of external hemostasis and rhino rocket to resolve bleeding. Vitals and hemoglobin stable but transferred for higher level of care. Transfer to [facility] requested for ENT evaluation.         Hospital/ICU Course:  Seen by ENT, recommending afrin and nasal saline during stay to alleviate epistaxis. Avoiding Nasal cannula use, only humidified facemask if needed (pt requiring). 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 AFB smear negative.CT significant for multiple malignant appearing nodules in both lobes, will need to follow up outpatient. Discussions took place with pt regarding starting Pulmonary rehab once outside the hospital.         Review of Systems   Constitutional:  Positive for fatigue. Negative for fever.   HENT:  Positive for nosebleeds. Negative for trouble swallowing.    Eyes:  Negative for pain and visual disturbance.   Respiratory:  Positive for shortness of breath and wheezing. Negative for chest tightness.    Cardiovascular:  Negative for chest pain and palpitations.   Gastrointestinal:   Negative for abdominal distention and abdominal pain.   Genitourinary:  Negative for decreased urine volume.   Musculoskeletal:  Negative for arthralgias and back pain.   Skin:  Positive for color change.             Neurological:  Positive for weakness and numbness. Negative for headaches.   Hematological:  Bruises/bleeds easily.   Psychiatric/Behavioral:  Negative for agitation, behavioral problems and confusion.    Objective:     Vital Signs (Most Recent):  Temp: 97.5 °F (36.4 °C) (07/27/23 0705)  Pulse: 72 (07/27/23 1320)  Resp: (!) 23 (07/27/23 1320)  BP: 131/62 (07/27/23 1300)  SpO2: 100 % (07/27/23 1320) Vital Signs (24h Range):  Temp:  [97.5 °F (36.4 °C)-98.2 °F (36.8 °C)] 97.5 °F (36.4 °C)  Pulse:  [] 72  Resp:  [15-41] 23  SpO2:  [87 %-100 %] 100 %  BP: ()/() 131/62   Weight: 57.3 kg (126 lb 5.2 oz)  Body mass index is 18.13 kg/m².      Intake/Output Summary (Last 24 hours) at 7/27/2023 1345  Last data filed at 7/27/2023 1101  Gross per 24 hour   Intake 1067.12 ml   Output 1400 ml   Net -332.88 ml          Physical Exam  Constitutional:       General: He is not in acute distress.     Appearance: He is ill-appearing.   HENT:      Head: Normocephalic and atraumatic.      Nose:      Comments: Admitted for significant epistaxis, not bleeding upon examination     Mouth/Throat:      Comments: Wearing humidified facemask  Eyes:      General: No scleral icterus.     Extraocular Movements: Extraocular movements intact.   Cardiovascular:      Rate and Rhythm: Normal rate and regular rhythm.      Pulses: Normal pulses.      Heart sounds: Normal heart sounds.   Pulmonary:      Effort: Respiratory distress present.      Breath sounds: Wheezing and rhonchi present.   Chest:      Chest wall: No tenderness.   Abdominal:      General: Abdomen is flat. Bowel sounds are normal.      Palpations: Abdomen is soft.   Musculoskeletal:         General: Normal range of motion.      Cervical back: Normal range of  motion.   Skin:     General: Skin is warm and dry.      Findings: Bruising and erythema present.   Neurological:      General: No focal deficit present.      Mental Status: He is alert and oriented to person, place, and time.   Psychiatric:         Mood and Affect: Mood normal.         Behavior: Behavior normal.          Vents:  Oxygen Concentration (%): 80 (07/27/23 1005)  Lines/Drains/Airways       Peripheral Intravenous Line  Duration                  Peripheral IV - Single Lumen 07/23/23 1927 20 G Left Forearm 3 days         Peripheral IV - Single Lumen 07/26/23 0915 18 G Right Forearm 1 day                  Significant Labs:    CBC/Anemia Profile:  Recent Labs   Lab 07/26/23  0429 07/26/23  1124 07/26/23 2118 07/27/23 0226   WBC 16.24*  --  22.93* 22.93*   HGB 11.0* 11.5* 9.1* 8.5*   HCT 34.0* 34.6* 28.0* 25.8*     --  410 342   MCV 99*  --  97 97   RDW 13.9  --  13.4 13.5        Chemistries:  Recent Labs   Lab 07/26/23 0429 07/27/23 0226   * 135*   K 3.7 4.3   CL 90* 93*   CO2 37* 31*   BUN 8 25*   CREATININE 0.6 0.6   CALCIUM 8.8 8.4*   ALBUMIN  --  3.0*   PROT  --  4.9*   BILITOT  --  0.3   ALKPHOS  --  40*   ALT  --  12   AST  --  14   MG  --  1.9   PHOS  --  3.6       All pertinent labs within the past 24 hours have been reviewed.    Significant Imaging:  I have reviewed all pertinent imaging results/findings within the past 24 hours.      [facility]  Recent Labs   Lab 07/23/23  1842   BE 15.70*     Assessment/Plan:     ENT  * Epistaxis  Transferred from OSH for ENT review. Rhinorocket applied at outside hospital. ENT evaluated at bedside and recommend:    - right nare absorbable packing will auto-dissolve  - Afrin to bilateral nares q8h for the next 48h and PRN for bleeds with firm anterior pressure.  - May place mustache dressing under nose for trickling, ok if dressing becomes saturated with some red/brown drainage  - Nasal saline q4h while packing in place; with nightly application of  vaseline/aquaphor to anterior nares  - Keep head of bed elevated  - Apply Afrin to affected nasal cavity if epistaxis recurs, please see detailed instructions below   - avoid nasal cannula. Humidified facemask oxygen if possible.  - Trend CBC. Type and screen sent. Transfuse if hgb < 7    Pulmonary  Chronic respiratory failure with hypoxia and hypercapnia  Patient with Hypercapnic and Hypoxic Respiratory failure which is Chronic.  he is on home oxygen at 3 LPM. Supplemental oxygen was provided and noted- Oxygen Concentration (%):  [80] 80    Signs/symptoms of respiratory failure include- tachypnea and increased work of breathing. Contributing diagnoses includes - COPD Labs and images were reviewed.     Plan per COPD.    Cavitary lesion of lung  CT Chest showed LORY multiple spiculated subcentimeter to 1.7 cm malignant-appearing pulmonary nodules.  2 cm noncalcified nodule also seen in right middle calcified granulomas also seen within the upper lobe.  Large cavitary mass in left lower lobe has increased in size.    - AFB at OSH negative for stain. Culture pending.     Per Provider Transfer Summary:      Left lower lobe cavitary lesion has been present since at least December of 2022.  PET scan in March 2023 showed hypermetabolic activity in left upper lobe nodule, hypermetabolic activity in the medial margin of the right middle lobe opacity, and moderate peripheral activity in the cavitary lesion in the left lung base.    -sputum AFB cultures in September 2022 had no growth.  -bronchoscopy AFB cultures in January 2023 had no growth.  Lung biopsy and brushings from the bronchoscopy were also negative for malignancy.    - Recommend continuing f/u outpatient    COPD exacerbation  Presented to OSH with SOB thought to be related to a COPD exacerbation. Patient is stage IV and DNR/DNI but would be amenable to intubation if needed for airway protection if epistaxis is fixable and he requires airway protection.     - Using  humidified facemask during stay (sp - 100% on mask), desaturating every time he is on room air  - SpO2 goal 88-92%  - Scheduled albuterol and breo  - Resp. culture from 7/23 significant for moraxella catarrhalis  - Continue rocephin and azithromycin   - Continue prednisone 40 mg to complete 5 day course   - Recommend following up with outpt Pulmonologist regarding adequate inhaler regimen, as well as Pulm Rehab    Cardiac/Vascular  HLD (hyperlipidemia)  Continue home atorvastatin    Essential hypertension  - Continue home medications  - SBP < 180 goal     Other  Tobacco dependence  - Continue home nicotine patch  - Cessation counseling       Critical Care Daily Checklist:    A: Awake: RASS Goal/Actual Goal: RASS Goal: 0-->alert and calm  Actual:     B: Spontaneous Breathing Trial Performed?     C: SAT & SBT Coordinated?  n/a                      D: Delirium: CAM-ICU Overall CAM-ICU: Negative   E: Early Mobility Performed? Yes   F: Feeding Goal:    Status:     Current Diet Order   Procedures    Diet Cardiac      AS: Analgesia/Sedation PRN   T: Thromboembolic Prophylaxis Holding in setting of epistaxis    H: HOB > 300 Yes   U: Stress Ulcer Prophylaxis (if needed) n/a   G: Glucose Control At goal   B: Bowel Function     I: Indwelling Catheter (Lines & Hernandez) Necessity PIV   D: De-escalation of Antimicrobials/Pharmacotherapies Continue rocephin and azithro    Plan for the day/ETD ENT evaluation, treat pulmonary infection, assess ability to tolerate room air    Code Status:  Family/Goals of Care: Partial Code         Critical secondary to Patient has a condition that poses threat to life and bodily function: Severe Respiratory Distress and Epistaxis      Critical care was time spent personally by me on the following activities: development of treatment plan with patient or surrogate and bedside caregivers, discussions with consultants, evaluation of patient's response to treatment, examination of patient, ordering and  performing treatments and interventions, ordering and review of laboratory studies, ordering and review of radiographic studies, pulse oximetry, re-evaluation of patient's condition. This critical care time did not overlap with that of any other provider or involve time for any procedures.     Collin El MD  Critical Care Medicine  [facility] - Cardiac Medical ICU

## 2023-07-27 NOTE — SUBJECTIVE & OBJECTIVE
Past Medical History:   Diagnosis Date    Abnormal CT scan     Arthritis     COPD (chronic obstructive pulmonary disease)     Hypertension        Past Surgical History:   Procedure Laterality Date    BRONCHOALVEOLAR LAVAGE N/A 1/18/2023    Procedure: LAVAGE, BRONCHOALVEOLAR;  Surgeon: Driss Bartholomew MD;  [facility]  Service: Pulmonary;  Laterality: N/A;    BRONCHOSCOPY WITH BIOPSY Bilateral 1/18/2023    Procedure: BRONCHOSCOPY, WITH BIOPSY;  Surgeon: Driss Bartholomew MD;  [facility];  Service: Pulmonary;  Laterality: Bilateral;  site: lungs    BRONCHOSCOPY, WITH BRUSH BIOPSY Bilateral 1/18/2023    Procedure: BRONCHOSCOPY, WITH BRUSH BIOPSY;  Surgeon: Driss Bartholomew MD;  Location: [facility];  Service: Pulmonary;  Laterality: Bilateral;    COLONOSCOPY      COLONOSCOPY N/A 8/21/2017    Procedure: COLONOSCOPY;  Surgeon: Jessica Fink MD;  Location: [facility];  Service: Endoscopy;  Laterality: N/A;       Review of patient's allergies indicates:  No Known Allergies    Family History       Problem Relation (Age of Onset)    Emphysema Mother    Heart failure Father          Tobacco Use    Smoking status: Every Day     Packs/day: 0.50     Years: 37.00     Pack years: 18.50     Types: Cigarettes     Start date: 4/8/1979    Smokeless tobacco: Never    Tobacco comments:        Substance and Sexual Activity    Alcohol use: Yes     Alcohol/week: 0.0 standard drinks     Comment: once a week    Drug use: No    Sexual activity: Not Currently      Review of Systems   Constitutional:  Negative for fever.   HENT:  Positive for nosebleeds.    Respiratory:  Positive for cough, shortness of breath and wheezing.    Gastrointestinal:  Negative for anal bleeding and vomiting.   Objective:     Vital Signs (Most Recent):  Pulse: (!) 118 (07/26/23 1840)  Resp: (!) 29 (07/26/23 2157)  BP: 133/74 (07/26/23 2200)  SpO2: 100 % (07/26/23 1840) Vital Signs (24h Range):  Temp:  [96.7 °F (35.9  °C)-98.2 °F (36.8 °C)] 98.2 °F (36.8 °C)  Pulse:  [] 118  Resp:  [16-41] 29  SpO2:  [87 %-100 %] 100 %  BP: (133-177)/(70-93) 133/74   Weight: 62.6 kg (138 lb 0.1 oz)  Body mass index is 19.8 kg/m².    No intake or output data in the 24 hours ending 07/26/23 2217       Physical Exam  Vitals and nursing note reviewed.   Constitutional:       Appearance: Normal appearance. He is not diaphoretic.      Comments: Sitting in bed watching TV. Speaking in full sentences.    HENT:      Nose:      Comments: Packing in place. No active bleeding.   Cardiovascular:      Rate and Rhythm: Normal rate and regular rhythm.   Pulmonary:      Effort: No respiratory distress.      Breath sounds: Wheezing and rhonchi present.      Comments: 4L Non rebreather. Diffuse loud wheezing to auscultation and rhonchi.   Abdominal:      General: Abdomen is flat. There is no distension.   Musculoskeletal:      Right lower leg: No edema.      Left lower leg: No edema.   Skin:     General: Skin is warm and dry.      Comments: Thin skin with some bruising to upper forearms bilaterally   Neurological:      Mental Status: He is alert.          Vents:     Lines/Drains/Airways       Peripheral Intravenous Line  Duration                  Peripheral IV - Single Lumen 07/23/23 1927 20 G Left Forearm 3 days         Peripheral IV - Single Lumen 07/26/23 0915 18 G Right Forearm <1 day                  Significant Labs:    CBC/Anemia Profile:  Recent Labs   Lab 07/26/23  0429 07/26/23  1124 07/26/23  2118   WBC 16.24*  --  22.93*   HGB 11.0* 11.5* 9.1*   HCT 34.0* 34.6* 28.0*     --  410   MCV 99*  --  97   RDW 13.9  --  13.4        Chemistries:  Recent Labs   Lab 07/26/23 0429   *   K 3.7   CL 90*   CO2 37*   BUN 8   CREATININE 0.6   CALCIUM 8.8       All pertinent labs within the past 24 hours have been reviewed.    Significant Imaging: I have reviewed all pertinent imaging results/findings within the past 24 hours.

## 2023-07-27 NOTE — ASSESSMENT & PLAN NOTE
CT Chest showed LORY multiple spiculated subcentimeter to 1.7 cm malignant-appearing pulmonary nodules.  2 cm noncalcified nodule also seen in right middle calcified granulomas also seen within the upper lobe.  Large cavitary mass in left lower lobe has increased in size.    - AFB at OSH negative for stain. Culture pending.     Per Provider Transfer Summary:      Left lower lobe cavitary lesion has been present since at least December of 2022.  PET scan in March 2023 showed hypermetabolic activity in left upper lobe nodule, hypermetabolic activity in the medial margin of the right middle lobe opacity, and moderate peripheral activity in the cavitary lesion in the left lung base.    -sputum AFB cultures in September 2022 had no growth.  -bronchoscopy AFB cultures in January 2023 had no growth.  Lung biopsy and brushings from the bronchoscopy were also negative for malignancy.    - Recommend continuing f/u outpatient

## 2023-07-27 NOTE — PLAN OF CARE
[facility] - Cardiac Medical ICU  Initial Discharge Assessment       Primary Care Provider: Dioni Matson MD    Admission Diagnosis: Acute respiratory failure [J96.00]    Admission Date: 7/26/2023  Expected Discharge Date:     Transition of Care Barriers: None    Payor: MEDICARE / Plan: MEDICARE PART A & B / Product Type: Government /     Extended Emergency Contact Information  Primary Emergency Contact: Gerry Culpgermaintrace   [place]  Mobile Phone: [phone]  Relation: Daughter  Secondary Emergency Contact: Michelle Palacios  Address: [address] [place] [address]  Home Phone: [phone]  Mobile Phone: [phone]  Relation: Daughter    Discharge Plan A: Home with family  Discharge Plan B: Home      [facility]  [address]  [facility] [address]  Phone: [phone] Fax: [phone]    [address] [facility]  [address] [facility] [address]  Phone: [phone] Fax: [phone]    [facility] AT Portal to [facility]  [place] [address]  Phone: [phone] Fax: [phone]    [facility] [address] [facility] [address]  [address] [address] [address]  Phone: [phone] Fax: [phone]      Transferred from:     Past Medical History:   Diagnosis Date    Abnormal CT scan     Arthritis     COPD (chronic obstructive pulmonary disease)     Hypertension          CM met with patient in room for Discharge Planning Assessment.  Patient was able to answer questions.  Per patient, he lives with Lloyd Culp (daughter) [phone] in a mobile home with 3 step(s) to enter.   Per patient, he was semi-dependent with ADLS and used no DME for ambulation. Per patient, he is not on dialysis and does not take Coumadin.  Patient will have help from Gerrygermaintrace Culp (daughter)  [phone] upon discharge.   Discharge Planning Booklet given to patient/family and discussed.  All questions addressed.  CM will follow for needs.      Initial Assessment (most recent)       Adult Discharge Assessment - 07/27/23 1514          Discharge Assessment    Assessment Type Discharge Planning Assessment     Confirmed/corrected address, phone number and insurance Yes     Confirmed Demographics Correct on Facesheet     Source of Information patient     When was your last doctors appointment? 07/05/23     Communicated SELVIN with patient/caregiver Date not available/Unable to determine     Reason For Admission Epistaxis     People in Home child(shirley), adult     Facility Arrived From: [facility]     Do you expect to return to your current living situation? Yes     Do you have help at home or someone to help you manage your care at home? Yes     Who are your caregiver(s) and their phone number(s)? Humera Culp (daughter) [phone]     Prior to hospitilization cognitive status: Alert/Oriented     Current cognitive status: Alert/Oriented     Dressing/Bathing bathing difficulty, assistance 1 person;dressing difficulty, assistance 1 person     Dressing/Bathing Management once in awhile, daughter assist in these tasks     Equipment Currently Used at Home none     Readmission within 30 days? Yes     Patient currently being followed by outpatient case management? No     Do you currently have service(s) that help you manage your care at home? No     Do you take prescription medications? Yes     Do you have prescription coverage? Yes     Coverage MEDICARE - MEDICARE PART A &     Do you have any problems affording any of your prescribed medications? No     Is the patient taking medications as prescribed? yes     Who is going to help you get home at discharge? Lloyd Culp (daughter) [phone], Michelle Palacios (daughter) [phone]     How do you get to doctors appointments? car, drives self;family or friend  will provide     Are you on dialysis? No     Do you take coumadin? No     Discharge Plan A Home with family     Discharge Plan B Home     DME Needed Upon Discharge  other (see comments)   TBD    Discharge Plan discussed with: Patient     Transition of Care Barriers None        Physical Activity    On average, how many days per week do you engage in moderate to strenuous exercise (like a brisk walk)? 0 days     On average, how many minutes do you engage in exercise at this level? 0 min        Financial Resource Strain    How hard is it for you to pay for the very basics like food, housing, medical care, and heating? Not very hard        Housing Stability    In the last 12 months, was there a time when you were not able to pay the mortgage or rent on time? No     In the last 12 months, how many places have you lived? 1     In the last 12 months, was there a time when you did not have a steady place to sleep or slept in a shelter (including now)? No        Transportation Needs    In the past 12 months, has lack of transportation kept you from medical appointments or from getting medications? No     In the past 12 months, has lack of transportation kept you from meetings, work, or from getting things needed for daily living? No        Food Insecurity    Within the past 12 months, you worried that your food would run out before you got the money to buy more. Never true     Within the past 12 months, the food you bought just didn't last and you didn't have money to get more. Never true        Stress    Do you feel stress - tense, restless, nervous, or anxious, or unable to sleep at night because your mind is troubled all the time - these days? To some extent        Social Connections    In a typical week, how many times do you talk on the phone with family, friends, or neighbors? More than three times a week     How often do you get together with friends or relatives? More than three times a week     How often do you  attend Scientology or Congregation services? Never     Do you belong to any clubs or organizations such as Scientology groups, unions, fraternal or athletic groups, or school groups? No     How often do you attend meetings of the clubs or organizations you belong to? Never     Are you , , , , never , or living with a partner?         Alcohol Use    Q1: How often do you have a drink containing alcohol? 4 or more times a week     Q2: How many drinks containing alcohol do you have on a typical day when you are drinking? 5 or 6     Q3: How often do you have six or more drinks on one occasion? Daily or almost daily        OTHER    Name(s) of People in Home Lloyd Culp (daughter) [phone]                            PCP:  Dioni Matson MD  [phone]        Pharmacy:    [facility]  [address] [place] [address]  Phone: [phone] Fax: [phone]    [address] [address]  Phone: [phone] Fax: [phone]    [facility] [place] AT Portal to [facility] [place] [facility]  [facility]  [place] [address]  Phone: [phone] Fax: [phone]    [facility] [address] [facility] [address] [address] [address] [address]  Phone: [phone] Fax: [phone]        Emergency Contacts:  Extended Emergency Contact Information  Primary Emergency Contact: Lloyd Culp   [place]  Mobile Phone: [phone]  Relation: Daughter  Secondary Emergency Contact: Michelle Palacios  Address: [address] [place] [address]  Home Phone: [phone]  Mobile Phone: [phone]  Relation: Daughter      Insurance:    Payor: MEDICARE / Plan: MEDICARE PART A & B / Product Type: Government /     Socorro Dixon RN   Case  Manager  [phone]      07/27/2023  3:22 PM

## 2023-07-27 NOTE — HPI
63M tx from OSH for epistaxis. Was admitted there for COPD exacerbation, on 3L NC home O2. Developed R unilateral epistaxis after a sneeze. Rhinorocket was placed there, persistent oozing was noted, eventually it fell out and he continued to bleed. Currently has paper towel in R nare, on facemask. Feels blood running down his throat. Denies bleeding from L nare. Concerned that he cannot catch his breath if nose is obstructed. Cavitary lesions in lungs but AFB negative.

## 2023-07-27 NOTE — ASSESSMENT & PLAN NOTE
Presented to OSH with SOB thought to be related to a COPD exacerbation. Patient is stage IV and DNR/DNI but would be amenable to intubation if needed for airway protection if epistaxis is fixable and he requires airway protection.     - Using humidified facemask during stay (sp - 100% on mask), desaturating every time he is on room air  - SpO2 goal 88-92%  - Scheduled albuterol and breo  - Resp. culture from 7/23 significant for moraxella catarrhalis  - Continue rocephin and azithromycin   - Continue prednisone 40 mg to complete 5 day course   - Recommend following up with outpt Pulmonologist regarding adequate inhaler regimen, as well as Pulm Rehab

## 2023-07-27 NOTE — H&P
[facility] - Cardiac Medical ICU  Critical Care Medicine  History & Physical    Patient Name: Brando Culp Jr.  MRN: [id number]  Admission Date: 7/26/2023  Hospital Length of Stay: 0 days  Code Status: Full Code  Attending Physician: Juan Gross MD   Primary Care Provider: Dioni Matson MD   Principal Problem: Epistaxis    Subjective:     HPI:  Brando Culp Jr. Is a 62 yo M with PMH of HTN, stage IV COPD on 3L NC at home, HTN, cavitary lesion of the lung, and HLD, presenting from OSH for ENT evaluation for persistent epistaxis. He originially presented for worsening SOB with dyspnea just getting out of bed and required BIPAP and was admitted to the ICU. COPD exacerbation was improving s/p abx, steroids, and weaned back to home oxygen requirements when he had nasal hemorrhage 7/26/2023 concerning for posterior bleed with clots in back of oropharynx and failure of external hemostasis and rhino rocket to resolve bleeding. Vitals and hemoglobin stable but transferred for higher level of care. Transfer to [facility] requested for ENT evaluation.         Hospital/ICU Course:  No notes on file     Past Medical History:   Diagnosis Date    Abnormal CT scan     Arthritis     COPD (chronic obstructive pulmonary disease)     Hypertension        Past Surgical History:   Procedure Laterality Date    BRONCHOALVEOLAR LAVAGE N/A 1/18/2023    Procedure: LAVAGE, BRONCHOALVEOLAR;  Surgeon: Driss Bartholomew MD;  Location: [facility];  Service: Pulmonary;  Laterality: N/A;    BRONCHOSCOPY WITH BIOPSY Bilateral 1/18/2023    Procedure: BRONCHOSCOPY, WITH BIOPSY;  Surgeon: Driss Bartholomew MD;  Location: [facility] OR;  Service: Pulmonary;  Laterality: Bilateral;  site: lungs    BRONCHOSCOPY, WITH BRUSH BIOPSY Bilateral 1/18/2023    Procedure: BRONCHOSCOPY, WITH BRUSH BIOPSY;  Surgeon: Driss Bartholomew MD;  Location: [facility] OR;  Service: Pulmonary;  Laterality: Bilateral;    COLONOSCOPY      COLONOSCOPY N/A  8/21/2017    Procedure: COLONOSCOPY;  Surgeon: Jessica Fink MD;  Location: [facility];  Service: Endoscopy;  Laterality: N/A;       Review of patient's allergies indicates:  No Known Allergies    Family History       Problem Relation (Age of Onset)    Emphysema Mother    Heart failure Father          Tobacco Use    Smoking status: Every Day     Packs/day: 0.50     Years: 37.00     Pack years: 18.50     Types: Cigarettes     Start date: 4/8/1979    Smokeless tobacco: Never    Tobacco comments:        Substance and Sexual Activity    Alcohol use: Yes     Alcohol/week: 0.0 standard drinks     Comment: once a week    Drug use: No    Sexual activity: Not Currently      Review of Systems   Constitutional:  Negative for fever.   HENT:  Positive for nosebleeds.    Respiratory:  Positive for cough, shortness of breath and wheezing.    Gastrointestinal:  Negative for anal bleeding and vomiting.   Objective:     Vital Signs (Most Recent):  Pulse: (!) 118 (07/26/23 1840)  Resp: (!) 29 (07/26/23 2157)  BP: 133/74 (07/26/23 2200)  SpO2: 100 % (07/26/23 1840) Vital Signs (24h Range):  Temp:  [96.7 °F (35.9 °C)-98.2 °F (36.8 °C)] 98.2 °F (36.8 °C)  Pulse:  [] 118  Resp:  [16-41] 29  SpO2:  [87 %-100 %] 100 %  BP: (133-177)/(70-93) 133/74   Weight: 62.6 kg (138 lb 0.1 oz)  Body mass index is 19.8 kg/m².    No intake or output data in the 24 hours ending 07/26/23 2217       Physical Exam  Vitals and nursing note reviewed.   Constitutional:       Appearance: Normal appearance. He is not diaphoretic.      Comments: Sitting in bed watching TV. Speaking in full sentences.    HENT:      Nose:      Comments: Packing in place. No active bleeding.   Cardiovascular:      Rate and Rhythm: Normal rate and regular rhythm.   Pulmonary:      Effort: No respiratory distress.      Breath sounds: Wheezing and rhonchi present.      Comments: 4L Non rebreather. Diffuse loud wheezing to auscultation and rhonchi.   Abdominal:       General: Abdomen is flat. There is no distension.   Musculoskeletal:      Right lower leg: No edema.      Left lower leg: No edema.   Skin:     General: Skin is warm and dry.      Comments: Thin skin with some bruising to upper forearms bilaterally   Neurological:      Mental Status: He is alert.          Vents:     Lines/Drains/Airways       Peripheral Intravenous Line  Duration                  Peripheral IV - Single Lumen 07/23/23 1927 20 G Left Forearm 3 days         Peripheral IV - Single Lumen 07/26/23 0915 18 G Right Forearm <1 day                  Significant Labs:    CBC/Anemia Profile:  Recent Labs   Lab 07/26/23  0429 07/26/23  1124 07/26/23  2118   WBC 16.24*  --  22.93*   HGB 11.0* 11.5* 9.1*   HCT 34.0* 34.6* 28.0*     --  410   MCV 99*  --  97   RDW 13.9  --  13.4        Chemistries:  Recent Labs   Lab 07/26/23  0429   *   K 3.7   CL 90*   CO2 37*   BUN 8   CREATININE 0.6   CALCIUM 8.8       All pertinent labs within the past 24 hours have been reviewed.    Significant Imaging: I have reviewed all pertinent imaging results/findings within the past 24 hours.    Assessment/Plan:     ENT  Epistaxis  Transferred from OSH for ENT review. Rhinorocket applied at outside hospital. ENT evaluated at bedside and recommend:    - right nare absorbable packing will auto-dissolve  - Afrin to bilateral nares q8h for the next 48h and PRN for bleeds with firm anterior pressure.  - May place mustache dressing under nose for trickling, ok if dressing becomes saturated with some red/brown drainage  - Nasal saline q4h while packing in place; with nightly application of vaseline/aquaphor to anterior nares  - Keep head of bed elevated  - Apply Afrin to affected nasal cavity if epistaxis recurs, please see detailed instructions below   - avoid nasal cannula. Humidified facemask oxygen if possible.  - Trend CBC. Type and screen sent. Transfuse if hgb < 7    Pulmonary  Chronic respiratory failure with hypoxia and  hypercapnia  Patient with Hypercapnic and Hypoxic Respiratory failure which is Chronic.  he is on home oxygen at 3 LPM. Supplemental oxygen was provided and noted- Oxygen Concentration (%):  [35-80] 80    Signs/symptoms of respiratory failure include- tachypnea and increased work of breathing. Contributing diagnoses includes - COPD Labs and images were reviewed.     Plan per COPD.    Cavitary lesion of lung  CT Chest showed LORY multiple spiculated subcentimeter to 1.7 cm malignant-appearing pulmonary nodules.  2 cm noncalcified nodule also seen in right middle calcified granulomas also seen within the upper lobe.  Large cavitary mass in left lower lobe has increased in size.    - AFB at OSH negative for stain. Culture pending.     Per Provider Transfer Summary:      Left lower lobe cavitary lesion has been present since at least December of 2022.  PET scan in March 2023 showed hypermetabolic activity in left upper lobe nodule, hypermetabolic activity in the medial margin of the right middle lobe opacity, and moderate peripheral activity in the cavitary lesion in the left lung base.    -sputum AFB cultures in September 2022 had no growth.  -bronchoscopy AFB cultures in January 2023 had no growth.  Lung biopsy and brushings from the bronchoscopy were also negative for malignancy.    COPD exacerbation  Presented to OSH with SOB thought to be related to a COPD exacerbation. Patient is stage IV and DNR/DNI but would be amenable to intubation if needed for airway protection if epistaxis is fixable and he requires airway protection.     - Continue 3L NC  - SpO2 goal 88-92%  - Scheduled albuterol   - Respiratory culture from 7/23/2023 showed normal chelle and moraxella catarrhalis.  - Was receiving vancomycin and zosyn. Had also received one dose of Levaquin on arrival and one dose of unasyn prior to transfer. Will switch to rocephin and azithromycin.  - Continue prednisone 40 mg to complete 5 day course   - Continue  Breo    Cardiac/Vascular  HLD (hyperlipidemia)  Continue home atorvastatin    Essential hypertension  - Continue home medications  - SBP < 180 goal     Other  Tobacco dependence  - Continue home nicotine patch  - Cessation counseling        Critical Care Daily Checklist:    A: Awake: RASS Goal/Actual Goal: RASS Goal: 0-->alert and calm  Actual:     B: Spontaneous Breathing Trial Performed?     C: SAT & SBT Coordinated?  N/A                      D: Delirium: CAM-ICU Overall CAM-ICU: Negative   E: Early Mobility Performed? No   F: Feeding Goal:    Status:     Current Diet Order   Procedures    Diet Cardiac      AS: Analgesia/Sedation PRN    T: Thromboembolic Prophylaxis Holding at this time    H: HOB > 300 Yes   U: Stress Ulcer Prophylaxis (if needed) N/A   G: Glucose Control < 180 goal    B: Bowel Function     I: Indwelling Catheter (Lines & Hernandez) Necessity PIV   D: De-escalation of Antimicrobials/Pharmacotherapies Complete COPDe coverage    Plan for the day/ETD Admit to ICU for ENT eval and overnight airway monitoring. Trend CBC. COPDe treatment.     Code Status:  Family/Goals of Care: Full Code       Critical Care Time: 60 minutes    Plan of care to be discussed with Dr. Gross and attestation to follow.    Critical secondary to Patient has a condition that poses threat to life and bodily function: chronic hypoxic respiratory failure and epistaxis      Critical care was time spent personally by me on the following activities: development of treatment plan with patient or surrogate and bedside caregivers, discussions with consultants, evaluation of patient's response to treatment, examination of patient, ordering and performing treatments and interventions, ordering and review of laboratory studies, ordering and review of radiographic studies, pulse oximetry, re-evaluation of patient's condition. This critical care time did not overlap with that of any other provider or involve time for any procedures.     Pablo  MARCELO Carrillo  Critical Care Medicine  [facility] - Cardiac Medical ICU

## 2023-07-27 NOTE — NURSING
1838 Pt arrived on unit via EMS.  Pt was AAOx4.  Pt was transferred from stretcher to unit bed without incidence.  Dr ANN Qureshi at bedside immediately.      Pt had notable ongoing bleeding from Nose and NRB was full. New mask was gotten and attempt made to clean pt from blood on  his body.  (Pt not yet in system so no charting was bale to be done).    Pt stated he had chronic back pain and general pain.  Pt is current smoker with last cigaret the day he was hospitalized.      Care was assumed by PM RN at approx 1900   ENT at bedside assessing and noted to be intervening in stopping nose bleed.

## 2023-07-27 NOTE — ASSESSMENT & PLAN NOTE
Patient with Hypercapnic and Hypoxic Respiratory failure which is Chronic.  he is on home oxygen at 3 LPM. Supplemental oxygen was provided and noted- Oxygen Concentration (%):  [35-80] 80    Signs/symptoms of respiratory failure include- tachypnea and increased work of breathing. Contributing diagnoses includes - COPD Labs and images were reviewed.     Plan per COPD.

## 2023-07-27 NOTE — ASSESSMENT & PLAN NOTE
Do not suspect Posterior epistaxis given unilateral and anterior source observed. Likely current bleed mostly from rhinorocket trauma.  Expect to ooze for 48 hours.     - right nare absorbable packing will auto-dissolve  - Afrin to bilateral nares q8h for the next 48h and PRN for bleeds with firm anterior pressure.  - May place mustache dressing under nose for trickling, ok if dressing becomes saturated with some red/brown drainage  - Nasal saline q4h while packing in place; with nightly application of vaseline/aquaphor to anterior nares  - Keep head of bed elevated  - Apply Afrin to affected nasal cavity if epistaxis recurs, please see detailed instructions below   - avoid nasal cannula. Humidified facemask oxygen if possible.            PATIENT INSTRUCTIONS:    Nasal precautions  - DO NOT blow your nose for 2 weeks. The only exception is that you may lightly blow your nose after using a sinus rinse.  - Sneeze/cough with mouth open  - Avoid irritating substances that might make you sneeze, such as dust, chalk, harsh chemicals, and allergic triggers. This might also include spicy foods.  - Do not smoke   - Avoid flying or swimming for 2 weeks.    - Avoid all heavy lifting, straining or bending for 2 weeks.   - Avoid semi-contact sports or vigorous exercising for 3-4 weeks.      Aftercare/ moisturizing recommendations to decrease frequency of nosebleeds:  - Daily nasal saline sprays/rinses  - Nightly application of vaseline/aquaphor to anterior nares  - Room humidification  - Avoidance of nasal cannula if possible (always with humidification and please use face tent, nasal cup, facemask if possible)  Management of medical conditions contributing to epistaxis (coagulopathy, hypertension, etc.)  - Humidification of CPAP appliance if applicable    If rebleeding occurs:   - Apply Afrin liberally to both nostrils  - Apply pressure over the soft part of the nose for 15 minutes (clip or digital pressure, important to have  pressure over soft part of nose and consistent pressure (do not release pressure at any point))  - Instruct patient to lean forward, avoid swallowing blood. This can cause nausea and vomiting  - Can repeat these steps above up to 3 times  - Call/reconsult ENT or return to the nearest emergency department if this is unsuccessful

## 2023-07-28 PROBLEM — R78.81 POSITIVE BLOOD CULTURE: Status: ACTIVE | Noted: 2023-01-01

## 2023-07-28 PROBLEM — D62 ACUTE BLOOD LOSS ANEMIA: Status: ACTIVE | Noted: 2023-01-01

## 2023-07-28 NOTE — ASSESSMENT & PLAN NOTE
Started at OSH and rhinorocket failed, right nare packing absorbabe placed by ENT here, will auto dissolve  -afrin to bilateral nares q8 for 48 hours and PRN For bleeds with firm anterior pressure  -may place dressing udner nose for trickling, expect some red brown drainage  -nasal salien q4 hours while packing in place, aquaphor for anterior nares  -HOB elevated  -avoid NC, on humidiifed face mask now  -apply afrin if reoccurs  -on exam spitting up clotted blood- see pic above  -hg downtrend to 7.7 from acute blood loss as patient reported copious swallowing of blood during evets at OSH, should start levelign off as oozing expected for 48 hours per ENT note about that time ago  -if continues to ooze would reassess and discsus further with ENT

## 2023-07-28 NOTE — ASSESSMENT & PLAN NOTE
Expected with large losses from nosebleed with patient reported cpious losses with large bleed at OSH and on admit here with still oozing, see picture above today  -hg 7.7 now from baseline 13 on admit at OSH  -some dyspnea may be from blood loss now so if persists tomorrow would consider tx if hg approaching lower 7's

## 2023-07-28 NOTE — SUBJECTIVE & OBJECTIVE
Interval history- patient stepped down from ICU overnight, he reports that this started with a COPD exacerbation he was at the OSH for and then he started to have a nosebleed even though they were using humidified oxygen (he uses 2-3 L oxygen at home) and it was uncontrolled bleeding  from the right nare despite rhinorhockets and so was transferred here for ENT eval. He had silvery cautery and absorbable packing placed in the right nare with csesation of the brisk bleeding. He is using afrin PRN around the packing and has 3-4 bottles of afrin at the bedside. He has aquaphor also at bedide and can use to anterior nares per ENT. They also rec keeping HOB elevated, avoid NC as he dose have the humidified facemask on 2L at bedside now. He was having some darker clots he is still coughing up, see picture below, as he said he swallowed a ton of blood when it was happening and ENT reports that he is likely going to have oozing in the net 48 hours (Were at 48 hours today) due to the rhinorocket trauma.they have further instructions hansel as dont blow your nose, see below. He had moraxalla from the CX and is on rocehin/azithro for COPD exacerbation coveage which should cover atypicals. Blood CX 7/23 with staph coag neg and contaminant and repeat NG. WBC elevated likely from steroids, CTA not showing PE and showing malignant apearing nodules and patient aware of this diagnosis of likely malignangy as has had work up in past with PET with hypermetabolic activity and has had biopsies that did not get the right spot suspected to be malignant and plan for further work up OP he said for cavitary mass known prior to admit.   Hg downtick to 7.7 from acute blood losses and he reported some dyspnea with ambulation so some may still be COPD that is still on treatment for but may also be from blood losses from oxygen carrying capacity so discussed if still having this tomorrow and hg is lower 7's then will consider a transfusion for  symptomatic relief. If he is still having clots tomorrow with downtick Hg would see if ENT reommends trending further but appears to be likely what they described they expected as per above.      Review of Systems   Constitutional:  Positive for fatigue. Negative for fever.   HENT:  Positive for nosebleeds. Negative for trouble swallowing.    Eyes:  Negative for pain and visual disturbance.   Respiratory:  Positive for shortness of breath and wheezing. Negative for chest tightness.    Cardiovascular:  Negative for chest pain and palpitations.   Gastrointestinal:  Negative for abdominal distention and abdominal pain.   Genitourinary:  Negative for decreased urine volume.   Musculoskeletal:  Negative for arthralgias and back pain.   Skin:  Positive for color change.             Neurological:  Positive for weakness and numbness. Negative for headaches.   Hematological:  Bruises/bleeds easily.   Psychiatric/Behavioral:  Negative for agitation, behavioral problems and confusion.    Objective:     Vital Signs (Most Recent):  Temp: 97.8 °F (36.6 °C) (07/28/23 1140)  Pulse: 65 (07/28/23 1519)  Resp: 18 (07/28/23 1402)  BP: (!) 121/59 (07/28/23 1140)  SpO2: 99 % (07/28/23 1519) Vital Signs (24h Range):  Temp:  [97.6 °F (36.4 °C)-98.2 °F (36.8 °C)] 97.8 °F (36.6 °C)  Pulse:  [60-82] 65  Resp:  [16-33] 18  SpO2:  [99 %-100 %] 99 %  BP: ()/(56-85) 121/59   Weight: 57.3 kg (126 lb 5.2 oz)  Body mass index is 18.13 kg/m².      Intake/Output Summary (Last 24 hours) at 7/28/2023 1602  Last data filed at 7/28/2023 0639  Gross per 24 hour   Intake --   Output 1250 ml   Net -1250 ml              Physical Exam  Constitutional:       General: He is not in acute distress.     Appearance: He is ill-appearing.      Comments: Spitting up small blood clot see picture above   HENT:      Head: Normocephalic and atraumatic.      Nose:      Comments: Some nasal packing in right with dried blood.     Mouth/Throat:      Comments: Wearing  humidified facemask  Eyes:      General: No scleral icterus.     Extraocular Movements: Extraocular movements intact.   Cardiovascular:      Rate and Rhythm: Normal rate and regular rhythm.      Pulses: Normal pulses.      Heart sounds: Normal heart sounds.   Pulmonary:      Effort: Respiratory distress present.      Breath sounds: Wheezing and rhonchi present.      Comments: Exp wheeze mild  Chest:      Chest wall: No tenderness.   Abdominal:      General: Abdomen is flat. Bowel sounds are normal.      Palpations: Abdomen is soft.   Musculoskeletal:         General: Normal range of motion.      Cervical back: Normal range of motion.   Skin:     General: Skin is warm and dry.      Findings: Bruising and erythema present.   Neurological:      General: No focal deficit present.      Mental Status: He is alert and oriented to person, place, and time.   Psychiatric:         Mood and Affect: Mood normal.         Behavior: Behavior normal.          Vents:  Oxygen Concentration (%): 80 (07/27/23 1005)  Lines/Drains/Airways       Peripheral Intravenous Line  Duration                  Peripheral IV - Single Lumen 07/23/23 1927 20 G Left Forearm 4 days         Peripheral IV - Single Lumen 07/26/23 0915 18 G Right Forearm 2 days                  Significant Labs:    CBC/Anemia Profile:  Recent Labs   Lab 07/26/23 2118 07/27/23 0226 07/28/23  0713   WBC 22.93* 22.93* 13.42*   HGB 9.1* 8.5* 7.7*   HCT 28.0* 25.8* 22.6*    342 327   MCV 97 97 95   RDW 13.4 13.5 13.5          Chemistries:  Recent Labs   Lab 07/27/23 0226 07/28/23  0713   * 134*   K 4.3 3.6   CL 93* 92*   CO2 31* 37*   BUN 25* 9   CREATININE 0.6 0.5   CALCIUM 8.4* 8.3*   ALBUMIN 3.0* 2.8*   PROT 4.9* 4.7*   BILITOT 0.3 0.2   ALKPHOS 40* 39*   ALT 12 20   AST 14 19   MG 1.9 1.7   PHOS 3.6 2.0*         All pertinent labs within the past 24 hours have been reviewed.    Significant Imaging:  I have reviewed all pertinent imaging results/findings within  the past 24 hours.

## 2023-07-28 NOTE — ASSESSMENT & PLAN NOTE
CTA neg for PE but known cavitary lesions and consistent with likely malignancy seen with previous malignant appearing lesions seen on prev CTS, LLL cavitary lesion larger  -AFB at OSH neg for stain  -present since t least 12,22  pET scan showing hypermetabolic activity in lesions before  -bronch in jan 2023 didn't show malignancy but suspet didn't get the right spot, needs f/u again with pulm to readdress long term     71 y/o male came in for reported ataxia per triage note but pt. reporting he has been ok, some dizziness ? As per pt. home visiting nurse found his bp to be high and send the pt. to the ER.  pt. was discharged from [facility] 1 week ago and was admitted for pre syncope and during his work up was found to have cva on mri brain. pt. was followed by neurology, cardiology ( was started on eliquis for cva and MCOT monitoring as an out-pt for afib  ) and by nephrology team for his ckd. pt. reports no focal weakness, no cp, no sob. no abd. pain, no n/v/d. mo HA reported. mo speech/ swallow difficulty. no hemoptysis, hematemesis no blood per rectum. MRI-H notable for subacute infarcts in left parietal and frontal lobes. Patient was continued on aspirin and eliquis. NOE was negative for cardioembolic source, but was notable for severe TR and will need outpatient follow up with cardiology. Of note, he tested positive for FOBT. However, patient was endorsing constipation for several days and has history of hemorrhoids. Denies heavy bleeding and H/H is stable. Will need outpatient GI evaluation. Patient is medically optimized for discharge to home w/ home PT/OT.

## 2023-07-28 NOTE — PROGRESS NOTES
[facility] - Telemetry [facility] Medicine  Progress Note    Patient Name: Brando Culp Jr.  MRN: [id number]  Patient Class: IP- Inpatient   Admission Date: 7/26/2023  Length of Stay: 2 days  Attending Physician: Reanna Epstein MD  Primary Care Provider: Dioni Matson MD        Subjective:     Principal Problem:Epistaxis        HPI:  No notes on file    Overview/Hospital Course:  No notes on file    Interval history- patient stepped down from ICU overnight, he reports that this started with a COPD exacerbation he was at the OSH for and then he started to have a nosebleed even though they were using humidified oxygen (he uses 2-3 L oxygen at home) and it was uncontrolled bleeding  from the right nare despite rhinorhockets and so was transferred here for ENT eval. He had silvery cautery and absorbable packing placed in the right nare with csesation of the brisk bleeding. He is using afrin PRN around the packing and has 3-4 bottles of afrin at the bedside. He has aquaphor also at bedide and can use to anterior nares per ENT. They also rec keeping HOB elevated, avoid NC as he dose have the humidified facemask on 2L at bedside now. He was having some darker clots he is still coughing up, see picture below, as he said he swallowed a ton of blood when it was happening and ENT reports that he is likely going to have oozing in the net 48 hours (Were at 48 hours today) due to the rhinorocket trauma.they have further instructions hansel as dont blow your nose, see below. He had moraxalla from the CX and is on rocehin/azithro for COPD exacerbation coveage which should cover atypicals. Blood CX 7/23 with staph coag neg and contaminant and repeat NG. WBC elevated likely from steroids, CTA not showing PE and showing malignant apearing nodules and patient aware of this diagnosis of likely malignangy as has had work up in past with PET with hypermetabolic activity and has had biopsies that did not get the right spot suspected  to be malignant and plan for further work up OP he said for cavitary mass known prior to admit.   Hg downtick to 7.7 from acute blood losses and he reported some dyspnea with ambulation so some may still be COPD that is still on treatment for but may also be from blood losses from oxygen carrying capacity so discussed if still having this tomorrow and hg is lower 7's then will consider a transfusion for symptomatic relief. If he is still having clots tomorrow with downtick Hg would see if ENT reommends trending further but appears to be likely what they described they expected as per above.      Review of Systems   Constitutional:  Positive for fatigue. Negative for fever.   HENT:  Positive for nosebleeds. Negative for trouble swallowing.    Eyes:  Negative for pain and visual disturbance.   Respiratory:  Positive for shortness of breath and wheezing. Negative for chest tightness.    Cardiovascular:  Negative for chest pain and palpitations.   Gastrointestinal:  Negative for abdominal distention and abdominal pain.   Genitourinary:  Negative for decreased urine volume.   Musculoskeletal:  Negative for arthralgias and back pain.   Skin:  Positive for color change.             Neurological:  Positive for weakness and numbness. Negative for headaches.   Hematological:  Bruises/bleeds easily.   Psychiatric/Behavioral:  Negative for agitation, behavioral problems and confusion.    Objective:     Vital Signs (Most Recent):  Temp: 97.8 °F (36.6 °C) (07/28/23 1140)  Pulse: 65 (07/28/23 1519)  Resp: 18 (07/28/23 1402)  BP: (!) 121/59 (07/28/23 1140)  SpO2: 99 % (07/28/23 1519) Vital Signs (24h Range):  Temp:  [97.6 °F (36.4 °C)-98.2 °F (36.8 °C)] 97.8 °F (36.6 °C)  Pulse:  [60-82] 65  Resp:  [16-33] 18  SpO2:  [99 %-100 %] 99 %  BP: ()/(56-85) 121/59   Weight: 57.3 kg (126 lb 5.2 oz)  Body mass index is 18.13 kg/m².      Intake/Output Summary (Last 24 hours) at 7/28/2023 1851  Last data filed at 7/28/2023 4346  Gross  per 24 hour   Intake --   Output 1250 ml   Net -1250 ml              Physical Exam  Constitutional:       General: He is not in acute distress.     Appearance: He is ill-appearing.      Comments: Spitting up small blood clot see picture above   HENT:      Head: Normocephalic and atraumatic.      Nose:      Comments: Some nasal packing in right with dried blood.     Mouth/Throat:      Comments: Wearing humidified facemask  Eyes:      General: No scleral icterus.     Extraocular Movements: Extraocular movements intact.   Cardiovascular:      Rate and Rhythm: Normal rate and regular rhythm.      Pulses: Normal pulses.      Heart sounds: Normal heart sounds.   Pulmonary:      Effort: Respiratory distress present.      Breath sounds: Wheezing and rhonchi present.      Comments: Exp wheeze mild  Chest:      Chest wall: No tenderness.   Abdominal:      General: Abdomen is flat. Bowel sounds are normal.      Palpations: Abdomen is soft.   Musculoskeletal:         General: Normal range of motion.      Cervical back: Normal range of motion.   Skin:     General: Skin is warm and dry.      Findings: Bruising and erythema present.   Neurological:      General: No focal deficit present.      Mental Status: He is alert and oriented to person, place, and time.   Psychiatric:         Mood and Affect: Mood normal.         Behavior: Behavior normal.          Vents:  Oxygen Concentration (%): 80 (07/27/23 1005)  Lines/Drains/Airways       Peripheral Intravenous Line  Duration                  Peripheral IV - Single Lumen 07/23/23 1927 20 G Left Forearm 4 days         Peripheral IV - Single Lumen 07/26/23 0915 18 G Right Forearm 2 days                  Significant Labs:    CBC/Anemia Profile:  Recent Labs   Lab 07/26/23  2118 07/27/23 0226 07/28/23  0713   WBC 22.93* 22.93* 13.42*   HGB 9.1* 8.5* 7.7*   HCT 28.0* 25.8* 22.6*    342 327   MCV 97 97 95   RDW 13.4 13.5 13.5          Chemistries:  Recent Labs   Lab 07/27/23 0226  07/28/23  0713   * 134*   K 4.3 3.6   CL 93* 92*   CO2 31* 37*   BUN 25* 9   CREATININE 0.6 0.5   CALCIUM 8.4* 8.3*   ALBUMIN 3.0* 2.8*   PROT 4.9* 4.7*   BILITOT 0.3 0.2   ALKPHOS 40* 39*   ALT 12 20   AST 14 19   MG 1.9 1.7   PHOS 3.6 2.0*         All pertinent labs within the past 24 hours have been reviewed.    Significant Imaging:  I have reviewed all pertinent imaging results/findings within the past 24 hours.      Assessment/Plan:      * Epistaxis  Started at OSH and rhinorocket failed, right nare packing absorbabe placed by ENT here, will auto dissolve  -afrin to bilateral nares q8 for 48 hours and PRN For bleeds with firm anterior pressure  -may place dressing udner nose for trickling, expect some red brown drainage  -nasal salien q4 hours while packing in place, aquaphor for anterior nares  -HOB elevated  -avoid NC, on humidiifed face mask now  -apply afrin if reoccurs  -on exam spitting up clotted blood- see pic above  -hg downtrend to 7.7 from acute blood loss as patient reported copious swallowing of blood during evets at OSH, should start levelign off as oozing expected for 48 hours per ENT note about that time ago  -if continues to ooze would reassess and discsus further with ENT        Positive blood culture  Coag neg staph on admit, contaminant, repeat NG      Acute blood loss anemia  Expected with large losses from nosebleed with patient reported cpious losses with large bleed at OSH and on admit here with still oozing, see picture above today  -hg 7.7 now from baseline 13 on admit at OSH  -some dyspnea may be from blood loss now so if persists tomorrow would consider tx if hg approaching lower 7's      HLD (hyperlipidemia)  Cont statin      Chronic respiratory failure with hypoxia and hypercapnia  Patient with Hypercapnic and Hypoxic Respiratory failure which is Acute on chronic.  he is on home oxygen at 2-3 LPM. Supplemental oxygen was provided and noted-      .   Signs/symptoms of  respiratory failure include- increased work of breathing and wheezing. Contributing diagnoses includes - COPD Labs and images were reviewed. Patient Has not had a recent ABG. Will treat underlying causes and adjust management of respiratory failure as follows- contineu COPD treatments    Leukocytosis  Likely from steroids, trend      Cavitary lesion of lung  CTA neg for PE but known cavitary lesions and consistent with likely malignancy seen with previous malignant appearing lesions seen on prev CTS, LLL cavitary lesion larger  -AFB at OSH neg for stain  -present since t least 12,22  pET scan showing hypermetabolic activity in lesions before  -bronch in jan 2023 didn't show malignancy but suspet didn't get the right spot, needs f/u again with pulm to readdress long term      COPD exacerbation  Started tx for COPD exacerbation at OSH with prednisone, nebs, azithro/rocephin for coverage with reports of some dyspnea still with tightness in chest per patient, cont breo, spiriva  -long term needs tobacco cessation as sequelae including COPD and likely lung cancer being worked up  -wbc eleation likely from prednisone   resp CX with moraxella covered with above  Follows with pulm OP  Cont humidified facemask as ENT reports avoid NC for now  Goal O2 88% and above  Wears 2-3L at home      Tobacco dependence  Dangers of cigarette smoking were reviewed with patient in detail. Patient was Counseled for 3-10 minutes. Nicotine replacement options were discussed. Nicotine replacement was discussed- not prescribed per patient's request    Essential hypertension  Cont home hctz coreg        VTE Risk Mitigation (From admission, onward)         Ordered     IP VTE HIGH RISK PATIENT  Once         07/26/23 2023     Place sequential compression device  Until discontinued         07/26/23 2023                Discharge Planning   SELVIN: 7/30/2023     Code Status: Partial Code   Is the patient medically ready for discharge?:     Reason for  patient still in hospital (select all that apply): Patient trending condition  Discharge Plan A: Home with family                  Reanna Epstein MD  Department of Hospital Medicine   [facility] - Telemetry Stepdown

## 2023-07-28 NOTE — PROGRESS NOTES
Nurses Note -- 4 Eyes      7/27/2023   11:22 PM      Skin assessed during: Transfer      [x] No Altered Skin Integrity Present    []Prevention Measures Documented      [] Yes- Altered Skin Integrity Present or Discovered   [] LDA Added if Not in Epic (Describe Wound)   [] New Altered Skin Integrity was Present on Admit and Documented in LDA   [] Wound Image Taken    Wound Care Consulted? No    Attending Nurse:  Heidi Ivey RN     Second RN/Staff Member:  che GAMA

## 2023-07-28 NOTE — ASSESSMENT & PLAN NOTE
Patient with Hypercapnic and Hypoxic Respiratory failure which is Acute on chronic.  he is on home oxygen at 2-3 LPM. Supplemental oxygen was provided and noted-      .   Signs/symptoms of respiratory failure include- increased work of breathing and wheezing. Contributing diagnoses includes - COPD Labs and images were reviewed. Patient Has not had a recent ABG. Will treat underlying causes and adjust management of respiratory failure as follows- contineu COPD treatments

## 2023-07-28 NOTE — ASSESSMENT & PLAN NOTE
Started tx for COPD exacerbation at OSH with prednisone, nebs, azithro/rocephin for coverage with reports of some dyspnea still with tightness in chest per patient, cont breo, spiriva  -long term needs tobacco cessation as sequelae including COPD and likely lung cancer being worked up  -wbc eleation likely from prednisone   resp CX with moraxella covered with above  Follows with pulm OP  Cont humidified facemask as ENT reports avoid NC for now  Goal O2 88% and above  Wears 2-3L at home

## 2023-07-28 NOTE — PLAN OF CARE
sent a message to pt's primary care office to make contact with pt to schedule his hospital f/u visit.

## 2023-07-29 NOTE — PROGRESS NOTES
Patient requesting to take off face mask. Pt home 02 is 3L - placed on 4L due to one nostril being packed. Pt resting comfortably on 4L with SpO2 100.

## 2023-07-29 NOTE — PLAN OF CARE
Problem: Adult Inpatient Plan of Care  Goal: Plan of Care Review  Outcome: Ongoing, Progressing  Goal: Patient-Specific Goal (Individualized)  Outcome: Ongoing, Progressing  Goal: Absence of Hospital-Acquired Illness or Injury  Outcome: Ongoing, Progressing  Goal: Optimal Comfort and Wellbeing  Outcome: Ongoing, Progressing  Goal: Readiness for Transition of Care  Outcome: Ongoing, Progressing   Aaox4. Poc reviewed. Questions and concerns answered. Safety interventions maintained. Care ongoing.

## 2023-07-30 PROBLEM — I95.1 ORTHOSTATIC HYPOTENSION: Status: ACTIVE | Noted: 2023-01-01

## 2023-07-30 NOTE — PLAN OF CARE
Problem: Adult Inpatient Plan of Care  Goal: Plan of Care Review  Outcome: Ongoing, Progressing  Goal: Patient-Specific Goal (Individualized)  Outcome: Ongoing, Progressing  Goal: Absence of Hospital-Acquired Illness or Injury  Outcome: Ongoing, Progressing  Goal: Optimal Comfort and Wellbeing  Outcome: Ongoing, Progressing  Goal: Readiness for Transition of Care  Outcome: Ongoing, Progressing   Orthostatic bp taken today. Fluids given as ordered. Aoox4. 4l of oxygen via nasal cannula. Safety interventions maintained. Care ongoing.

## 2023-07-30 NOTE — PLAN OF CARE
Problem: Adult Inpatient Plan of Care  Goal: Plan of Care Review  Outcome: Ongoing, Progressing  Goal: Patient-Specific Goal (Individualized)  Outcome: Ongoing, Progressing  Goal: Absence of Hospital-Acquired Illness or Injury  Outcome: Ongoing, Progressing  Goal: Optimal Comfort and Wellbeing  Outcome: Ongoing, Progressing  Goal: Readiness for Transition of Care  Outcome: Ongoing, Progressing   Aaox4. 4 L nasal cannula. Room air. Safety interventions maintained. Poc reviewed. Care ongoing.

## 2023-07-30 NOTE — ASSESSMENT & PLAN NOTE
Started at OSH and rhinorocket failed, right nare packing absorbabe placed by ENT here, will auto dissolve. Completed afrin to bilateral nares q8 for 48 hours and PRN For bleeds with firm anterior pressure. May place dressing udner nose for trickling, expect some red brown drainage. Nasal saline q4 hours while packing in place, aquaphor for anterior nares. ENT recommended patient to avoid NC, but patient uncomfortable with humidified face mask. Asked nursing to place nasal cannula on bubbler. Apply afrin if reoccurs. Continue nasal saline and aquaphor at discharge.

## 2023-07-30 NOTE — PROGRESS NOTES
[facility] Medicine  Progress note    Team: [facility] MED S Ema Bryant MD  Admit Date: 7/26/2023    Principal Problem:  Epistaxis    Interval hx: Complains about the continued presence of packing in his right nostril. Also feels a little dizzy with position changes    ROS   Respiratory: neg for cough neg for shortness of breath  Cardiovascular: neg for chest pain neg for palpitations  Gastrointestinal: neg for nausea neg for vomiting, neg for abdominal pain neg for diarrhea neg for constipation   Behavioral/Psych: neg for depression neg for anxiety    PEx  Temp:  [97.4 °F (36.3 °C)-98.6 °F (37 °C)]   Pulse:  [60-69]   Resp:  [16-24]   BP: ()/(54-72)   SpO2:  [95 %-100 %]   No intake or output data in the 24 hours ending 07/29/23 2144    General Appearance: no acute distress, WD, elderly, ill-appearing  Heart: regular rate and rhythm, no heave  Respiratory: Normal respiratory effort, symmetric excursion, bilateral vesicular breath sounds   Abdomen: Soft, non-tender; bowel sounds active  Skin: intact, no rash, no ulcers  Neurologic:  No focal numbness or weakness  Mental status: Alert, oriented x 4, affect appropriate     Recent Labs   Lab 07/28/23 0713 07/29/23 0352 07/29/23  1601   WBC 13.42* 15.33* 15.58*   HGB 7.7* 7.6* 7.7*   HCT 22.6* 23.1* 22.4*    356 344     Recent Labs   Lab 07/27/23 0226 07/28/23 0713 07/29/23  0352   * 134* 134*   K 4.3 3.6 3.9   CL 93* 92* 92*   CO2 31* 37* 36*   BUN 25* 9 5*   CREATININE 0.6 0.5 0.6   GLU 91 85 82   CALCIUM 8.4* 8.3* 8.5*   MG 1.9 1.7 1.8   PHOS 3.6 2.0* 2.3*     Recent Labs   Lab 07/27/23 0226 07/28/23 0713 07/29/23  0352   ALKPHOS 40* 39* 39*   ALT 12 20 24   AST 14 19 17   ALBUMIN 3.0* 2.8* 2.8*   PROT 4.9* 4.7* 4.6*   BILITOT 0.3 0.2 0.2      Scheduled Meds:   albuterol sulfate  2.5 mg Nebulization Q6H WAKE    busPIRone  15 mg Oral TID    cefTRIAXone (ROCEPHIN) IVPB  2 g Intravenous Q24H    fluticasone furoate-vilanteroL  1 puff Inhalation  Daily    nicotine  1 patch Transdermal Daily    sodium chloride  2 spray Each Nostril QID    tiotropium bromide  2 puff Inhalation Daily    white petrolatum   Topical (Top) QHS     Continuous Infusions:  As Needed:  HYDROcodone-acetaminophen, levalbuterol, oxymetazoline, sodium chloride 0.9%, traMADoL    Assessment and Plan  / Problems managed today    * Epistaxis  Started at OSH and rhinorocket failed, right nare packing absorbabe placed by ENT here, will auto dissolve  -completed afrin to bilateral nares q8 for 48 hours and PRN For bleeds with firm anterior pressure  -may place dressing udner nose for trickling, expect some red brown drainage  -nasal saline q4 hours while packing in place, aquaphor for anterior nares  -HOB elevated  -avoid NC, but patient uncomfortable with humidiifed face mask. Asked nursing to place nasal cannula on bubbler  -apply afrin if reoccurs  -if continues to ooze would reassess and discsus further with ENT        Positive blood culture  Coag neg staph on admit, contaminant, repeat NG      Acute blood loss anemia  Expected with large losses from nosebleed with patient reported cpious losses with large bleed at OSH and on admit here with still oozing, see picture above today  -hg 7.7 now from baseline 13 on admit at OSH  -some dyspnea may be from blood loss now so if persists tomorrow would consider tx if hg approaching lower 7's      HLD (hyperlipidemia)  Cont statin      Chronic respiratory failure with hypoxia and hypercapnia  Patient with Hypercapnic and Hypoxic Respiratory failure which is Acute on chronic.  he is on home oxygen at 2-3 LPM. Supplemental oxygen was provided and noted-      .   Signs/symptoms of respiratory failure include- increased work of breathing and wheezing. Contributing diagnoses includes - COPD Labs and images were reviewed. Patient Has not had a recent ABG. Will treat underlying causes and adjust management of respiratory failure as follows- contineu COPD  treatments    Leukocytosis  Likely from steroids, trend      Cavitary lesion of lung  CTA neg for PE but known cavitary lesions and consistent with likely malignancy seen with previous malignant appearing lesions seen on prev CTS, LLL cavitary lesion larger  -AFB at OSH neg for stain  -present since t least 12,22  pET scan showing hypermetabolic activity in lesions before  -bronch in jan 2023 didn't show malignancy but suspet didn't get the right spot, needs f/u again with pulm to readdress long term      COPD exacerbation  Started tx for COPD exacerbation at OSH with prednisone, nebs, azithro/rocephin for coverage with reports of some dyspnea still with tightness in chest per patient, cont breo, spiriva  -long term needs tobacco cessation as sequelae including COPD and likely lung cancer being worked up  -wbc eleation likely from prednisone   resp CX with moraxella covered with above  Follows with pulm OP  Cont humidified facemask as ENT reports avoid NC for now  Goal O2 88% and above  Wears 2-3L at home      Tobacco dependence  Dangers of cigarette smoking were reviewed with patient in detail. Patient was Counseled for 3-10 minutes. Nicotine replacement options were discussed. Nicotine replacement was discussed- not prescribed per patient's request    Essential hypertension  Cont home hctz coreg      Discharge Planning   SELVIN: 7/30/2023     Code Status: Partial Code   Is the patient medically ready for discharge?:     Reason for patient still in hospital (select all that apply): Patient trending condition and Treatment  Discharge Plan A: Home with family      Diet:  regular diet  GI PPx: not needed  DVT PPx:  CHIKI/SCD  Airways: room air  Wounds: none    Goals of Care:  Return to prior functional status     Time (minutes) spent in care of the patient including review of tests, flow sheets and notes since last visit, face to face contact, placing orders, communicating with consultants if needed, care coordination, and  documentation: 35 min.    Ema Bryant MD

## 2023-07-31 PROBLEM — Z51.5 PALLIATIVE CARE ENCOUNTER: Status: ACTIVE | Noted: 2023-01-01

## 2023-07-31 NOTE — CONSULTS
"[facility] - Telemetry Stepdown  Palliative Medicine  Consult Note    Patient Name: Brando Culp Jr.  MRN: [id number]  Admission Date: 7/26/2023  Hospital Length of Stay: 5 days  Code Status: Partial Code   Attending Provider: Ema Bryant MD  Consulting Provider: Екатерина Betancur MD  Primary Care Physician: Dioni Matson MD  Principal Problem:Epistaxis    Patient information was obtained from patient and primary team.      Inpatient consult to Palliative Care  Consult performed by: Екатерина Betancur MD  Consult ordered by: Ema Bryant MD        Assessment/Plan:     Palliative Care  Palliative care encounter  63 year old male with COPD and presumed lung malignancy admitted for COPD exacerbation and epistaxis. Palliative consulted for GOC    COPD exacerbation/lung nodule/epistaxis/chronic hypoxic respiratory failure/orthostatic hypotension  -management per primary and other consultants     Code status: DNR. Did not readdress     Surrogate decision maker: 2 adult daughters     GOC/ACP  -Met with patient at bedside. Introduced palliative medicine. Patient explained that he plans to pursue another lung bx. He is expecting the path to show cancer. Patient says that he is not interested in traditional chemo but would be interested in "a pill" to give him more time. Patient's quality of life is acceptable to him. He says he is able to get around the house ok as long as he is taking his pain medication which also helps his shortness of breath. His daughter helps him out a lot. If he were not a candidate for immunotherapy or his quality of life worsened he would be open to conversations about shifting focus toward comfort     -Goal is to get a lung bx and see if he is a candidate for immunotherapy so he can be around longer for his children and grandchildren     Shortness of breath  -Patient reports that the opioids he takes for his chronic back pain help greatly with his dyspnea. Patient has a chronic pain doctor closer " "to home who he wants to continue to follow with     Discussed with Dr. Bryant             Thank you for your consult. I will sign off. Please contact us if you have any additional questions.    Subjective:     HPI:   Per critical care H&P  "Brando Culp Jr. Is a 62 yo M with PMH of HTN, stage IV COPD on 3L NC at home, HTN, cavitary lesion of the lung, and HLD, presenting from OSH for ENT evaluation for persistent epistaxis. He originially presented for worsening SOB with dyspnea just getting out of bed and required BIPAP and was admitted to the ICU. COPD exacerbation was improving s/p abx, steroids, and weaned back to home oxygen requirements when he had nasal hemorrhage 7/26/2023 concerning for posterior bleed with clots in back of oropharynx and failure of external hemostasis and rhino rocket to resolve bleeding. Vitals and hemoglobin stable but transferred for higher level of care. Transfer to [facility] requested for ENT evaluation. "    Epistaxis has now resolved. Palliative consulted to discuss GOC.       Hospital Course:  No notes on file        Past Medical History:   Diagnosis Date    Abnormal CT scan     Arthritis     COPD (chronic obstructive pulmonary disease)     Hypertension        Past Surgical History:   Procedure Laterality Date    BRONCHOALVEOLAR LAVAGE N/A 1/18/2023    Procedure: LAVAGE, BRONCHOALVEOLAR;  Surgeon: Driss Bartholomew MD;  Location: [facility];  Service: Pulmonary;  Laterality: N/A;    BRONCHOSCOPY WITH BIOPSY Bilateral 1/18/2023    Procedure: BRONCHOSCOPY, WITH BIOPSY;  Surgeon: rDiss Bartholomew MD;  Location[facility] OR;  Service: Pulmonary;  Laterality: Bilateral;  site: lungs    BRONCHOSCOPY, WITH BRUSH BIOPSY Bilateral 1/18/2023    Procedure: BRONCHOSCOPY, WITH BRUSH BIOPSY;  Surgeon: Driss Bartholomew MD;  Location: [facility] OR;  Service: Pulmonary;  Laterality: Bilateral;    COLONOSCOPY      COLONOSCOPY N/A 8/21/2017    Procedure: COLONOSCOPY;  Surgeon: " Jessica Fink MD;  Location: [facility];  Service: Endoscopy;  Laterality: N/A;       Review of patient's allergies indicates:  No Known Allergies    Medications:  Continuous Infusions:   sodium chloride 0.9% 250 mL/hr at 07/31/23 1159     Scheduled Meds:   albuterol sulfate  2.5 mg Nebulization Q6H WAKE    busPIRone  15 mg Oral TID    fluticasone furoate-vilanteroL  1 puff Inhalation Daily    mineral oil-hydrophil petrolat   Topical (Top) TID    nicotine  1 patch Transdermal Daily    polyethylene glycol  17 g Oral Daily    sodium chloride  2 spray Each Nostril QID    tiotropium bromide  2 puff Inhalation Daily    white petrolatum   Topical (Top) QHS     PRN Meds:glucagon (human recombinant), glucose, glucose, HYDROcodone-acetaminophen, levalbuterol, melatonin, naloxone, sodium chloride 0.9%, traMADoL    Family History       Problem Relation (Age of Onset)    Emphysema Mother    Heart failure Father          Tobacco Use    Smoking status: Every Day     Current packs/day: 0.50     Average packs/day: 0.5 packs/day for 44.3 years (22.2 ttl pk-yrs)     Types: Cigarettes     Start date: 4/8/1979    Smokeless tobacco: Never    Tobacco comments:        Substance and Sexual Activity    Alcohol use: Yes     Alcohol/week: 0.0 standard drinks of alcohol     Comment: once a week    Drug use: No    Sexual activity: Not Currently       Review of Systems   Constitutional:  Positive for fatigue.   HENT: Negative.     Respiratory:  Positive for shortness of breath.    Gastrointestinal: Negative.    Endocrine: Negative.    Musculoskeletal:  Positive for back pain.   Skin: Negative.    Allergic/Immunologic: Negative.    Neurological:  Positive for weakness.   Psychiatric/Behavioral: Negative.       Objective:     Vital Signs (Most Recent):  Temp: 98.7 °F (37.1 °C) (07/31/23 1135)  Pulse: 72 (07/31/23 1135)  Resp: 18 (07/31/23 1156)  BP: (!) 141/65 (07/31/23 1135)  SpO2: 99 % (07/31/23 1135) Vital Signs (24h  Range):  Temp:  [97.7 °F (36.5 °C)-98.7 °F (37.1 °C)] 98.7 °F (37.1 °C)  Pulse:  [68-80] 72  Resp:  [13-26] 18  SpO2:  [96 %-100 %] 99 %  BP: (121-170)/(58-77) 141/65     Weight: 59.9 kg (132 lb 0.9 oz)  Body mass index is 18.95 kg/m².       Physical Exam  Vitals and nursing note reviewed.   Constitutional:       General: He is not in acute distress.  HENT:      Head: Normocephalic.      Right Ear: External ear normal.      Left Ear: External ear normal.      Nose: Nose normal.      Mouth/Throat:      Mouth: Mucous membranes are moist.   Eyes:      Pupils: Pupils are equal, round, and reactive to light.   Cardiovascular:      Rate and Rhythm: Normal rate.   Pulmonary:      Effort: Pulmonary effort is normal. No respiratory distress.      Comments: On 2L  Abdominal:      General: There is no distension.   Musculoskeletal:         General: Normal range of motion.      Cervical back: Normal range of motion.   Neurological:      Mental Status: He is alert and oriented to person, place, and time.   Psychiatric:         Mood and Affect: Mood normal.            Review of Symptoms      Symptom Assessment (ESAS 0-10 Scale)  Pain:  0  Dyspnea:  3  Anxiety:  0  Nausea:  0  Depression:  0  Anorexia:  0  Fatigue:  2  Insomnia:  0  Restlessness:  0  Agitation:  0     CAM / Delirium:  Negative  Constipation:  Negative  Diarrhea:  Negative      Bowel Management Plan (BMP):  Yes      Modified Niyah Scale:  0    Performance Status:  50    Living Arrangements:  Lives with family    Psychosocial/Cultural:   See Palliative Psychosocial Note: No  Has 2 adult daughters. Has two grandchildren. Lives with one of his daughters. Enjoys playing with his grandchildren. Worked in a [employer] for many years   **Primary  to Follow**  Palliative Care  Consult: No    Spiritual:  F - Sadaf and Belief:  Yes  I - Importance:  Yes  A - Address in Care:  Yes        Advance Care Planning  Advance Directives:   Living Will: No     LaPOST: No    Do Not Resuscitate Status: Yes    Medical Power of : No      Decision Making:  Patient answered questions  Goals of Care: The patient endorses that what is most important right now is to focus on improvement in condition but with limits to invasive therapies    Accordingly, we have decided that the best plan to meet the patient's goals includes continuing with treatment         Significant Labs: All pertinent labs within the past 24 hours have been reviewed.  CBC:   Recent Labs   Lab 07/31/23  0552   WBC 13.74*   HGB 8.0*   HCT 24.1*   MCV 98        BMP:  Recent Labs   Lab 07/31/23  0552   GLU 62*      K 3.5   CL 98   CO2 32*   BUN 5*   CREATININE 0.5   CALCIUM 8.5*   MG 1.8     LFT:  Lab Results   Component Value Date    AST 17 07/31/2023    ALKPHOS 42 (L) 07/31/2023    BILITOT 0.2 07/31/2023     Albumin:   Albumin   Date Value Ref Range Status   07/31/2023 3.0 (L) 3.5 - 5.2 g/dL Final     Protein:   Total Protein   Date Value Ref Range Status   07/31/2023 5.1 (L) 6.0 - 8.4 g/dL Final     Lactic acid:   Lab Results   Component Value Date    LACTATE 1.0 03/12/2023       Significant Imaging: I have reviewed all pertinent imaging results/findings within the past 24 hours.    CTA chest 7/23/23  Impression:     1.  No CTA evidence pulmonary emboli     2.  Multiple malignant-appearing pulmonary nodules in left upper and right middle lobe     3.  Enlarging thick wall cavitary mass in left lung base           I spent a total of 80 minutes on the day of the visit. This includes face to face time in discussion of goals of care, symptom assessment, coordination of care and emotional support.  This also includes non-face to face time preparing to see the patient (eg, review of tests/imaging), obtaining and/or reviewing separately obtained history, documenting clinical information in the electronic or other health record, independently interpreting results and communicating results to the  patient/family/caregiver, or care coordinator.    Екатерина Betancur MD  Palliative Medicine  [facility] - Telemetry Stepdown

## 2023-07-31 NOTE — ASSESSMENT & PLAN NOTE
"63 year old male with COPD and presumed lung malignancy admitted for COPD exacerbation and epistaxis. Palliative consulted for [facility]    COPD exacerbation/lung nodule/epistaxis/chronic hypoxic respiratory failure/orthostatic hypotension  -management per primary and other consultants     Code status: DNR. Did not readdress     Surrogate decision maker: 2 adult daughters     GOC/ACP  -Met with patient at bedside. Introduced palliative medicine. Patient explained that he plans to pursue another lung bx. He is expecting the path to show cancer. Patient says that he is not interested in traditional chemo but would be interested in "a pill" to give him more time. Patient's quality of life is acceptable to him. He says he is able to get around the house ok as long as he is taking his pain medication which also helps his shortness of breath. His daughter helps him out a lot. If he were not a candidate for immunotherapy or his quality of life worsened he would be open to conversations about shifting focus toward comfort     -Goal is to get a lung bx and see if he is a candidate for immunotherapy so he can be around longer for his children and grandchildren     Shortness of breath  -Patient reports that the opioids he takes for his chronic back pain help greatly with his dyspnea. Patient has a chronic pain doctor closer to home who he wants to continue to follow with     Discussed with Dr. Bryant       "

## 2023-07-31 NOTE — ASSESSMENT & PLAN NOTE
With dizziness. Improved but not resolved with 6L of fluids and one unit PRBC. His output was somewhat net negative despite adequate oral intake and IVFs with normal GFR. Likely chronic but worsened due to acute blood loss. Dizziness resolved after PRBC unit transfusion.

## 2023-07-31 NOTE — SUBJECTIVE & OBJECTIVE
Past Medical History:   Diagnosis Date    Abnormal CT scan     Arthritis     COPD (chronic obstructive pulmonary disease)     Hypertension        Past Surgical History:   Procedure Laterality Date    BRONCHOALVEOLAR LAVAGE N/A 1/18/2023    Procedure: LAVAGE, BRONCHOALVEOLAR;  Surgeon: Driss Bartholomew MD;  Location: [facility];  Service: Pulmonary;  Laterality: N/A;    BRONCHOSCOPY WITH BIOPSY Bilateral 1/18/2023    Procedure: BRONCHOSCOPY, WITH BIOPSY;  Surgeon: Driss Bartholomew MD;  Location: [facility];  Service: Pulmonary;  Laterality: Bilateral;  site: lungs    BRONCHOSCOPY, WITH BRUSH BIOPSY Bilateral 1/18/2023    Procedure: BRONCHOSCOPY, WITH BRUSH BIOPSY;  Surgeon: Driss Bartholomew MD;  Location: [facility] OR;  Service: Pulmonary;  Laterality: Bilateral;    COLONOSCOPY      COLONOSCOPY N/A 8/21/2017    Procedure: COLONOSCOPY;  Surgeon: Jessica Fink MD;  Location: [facility];  Service: Endoscopy;  Laterality: N/A;       Review of patient's allergies indicates:  No Known Allergies    Medications:  Continuous Infusions:   sodium chloride 0.9% 250 mL/hr at 07/31/23 1159     Scheduled Meds:   albuterol sulfate  2.5 mg Nebulization Q6H WAKE    busPIRone  15 mg Oral TID    fluticasone furoate-vilanteroL  1 puff Inhalation Daily    mineral oil-hydrophil petrolat   Topical (Top) TID    nicotine  1 patch Transdermal Daily    polyethylene glycol  17 g Oral Daily    sodium chloride  2 spray Each Nostril QID    tiotropium bromide  2 puff Inhalation Daily    white petrolatum   Topical (Top) QHS     PRN Meds:glucagon (human recombinant), glucose, glucose, HYDROcodone-acetaminophen, levalbuterol, melatonin, naloxone, sodium chloride 0.9%, traMADoL    Family History       Problem Relation (Age of Onset)    Emphysema Mother    Heart failure Father          Tobacco Use    Smoking status: Every Day     Current packs/day: 0.50     Average packs/day: 0.5 packs/day for 44.3 years (22.2 ttl pk-yrs)      PAIN - ADULT    • Verbalizes/displays adequate comfort level or patient's stated pain goal Progressing        Patient Centered Care    • Patient preferences are identified and integrated in the patient's plan of care Progressing        Patient/Family Goals Types: Cigarettes     Start date: 4/8/1979    Smokeless tobacco: Never    Tobacco comments:        Substance and Sexual Activity    Alcohol use: Yes     Alcohol/week: 0.0 standard drinks of alcohol     Comment: once a week    Drug use: No    Sexual activity: Not Currently       Review of Systems   Constitutional:  Positive for fatigue.   HENT: Negative.     Respiratory:  Positive for shortness of breath.    Gastrointestinal: Negative.    Endocrine: Negative.    Musculoskeletal:  Positive for back pain.   Skin: Negative.    Allergic/Immunologic: Negative.    Neurological:  Positive for weakness.   Psychiatric/Behavioral: Negative.       Objective:     Vital Signs (Most Recent):  Temp: 98.7 °F (37.1 °C) (07/31/23 1135)  Pulse: 72 (07/31/23 1135)  Resp: 18 (07/31/23 1156)  BP: (!) 141/65 (07/31/23 1135)  SpO2: 99 % (07/31/23 1135) Vital Signs (24h Range):  Temp:  [97.7 °F (36.5 °C)-98.7 °F (37.1 °C)] 98.7 °F (37.1 °C)  Pulse:  [68-80] 72  Resp:  [13-26] 18  SpO2:  [96 %-100 %] 99 %  BP: (121-170)/(58-77) 141/65     Weight: 59.9 kg (132 lb 0.9 oz)  Body mass index is 18.95 kg/m².       Physical Exam  Vitals and nursing note reviewed.   Constitutional:       General: He is not in acute distress.  HENT:      Head: Normocephalic.      Right Ear: External ear normal.      Left Ear: External ear normal.      Nose: Nose normal.      Mouth/Throat:      Mouth: Mucous membranes are moist.   Eyes:      Pupils: Pupils are equal, round, and reactive to light.   Cardiovascular:      Rate and Rhythm: Normal rate.   Pulmonary:      Effort: Pulmonary effort is normal. No respiratory distress.      Comments: On 2L  Abdominal:      General: There is no distension.   Musculoskeletal:         General: Normal range of motion.      Cervical back: Normal range of motion.   Neurological:      Mental Status: He is alert and oriented to person, place, and time.   Psychiatric:         Mood and Affect: Mood normal.            Review of  Symptoms      Symptom Assessment (ESAS 0-10 Scale)  Pain:  0  Dyspnea:  3  Anxiety:  0  Nausea:  0  Depression:  0  Anorexia:  0  Fatigue:  2  Insomnia:  0  Restlessness:  0  Agitation:  0     CAM / Delirium:  Negative  Constipation:  Negative  Diarrhea:  Negative      Bowel Management Plan (BMP):  Yes      Modified Niyah Scale:  0    Performance Status:  50    Living Arrangements:  Lives with family    Psychosocial/Cultural:   See Palliative Psychosocial Note: No  Has 2 adult daughters. Has two grandchildren. Lives with one of his daughters. Enjoys playing with his grandchildren. Worked in a [employer] for many years   **Primary  to Follow**  Palliative Care  Consult: No    Spiritual:  F - Sadaf and Belief:  Yes  I - Importance:  Yes  A - Address in Care:  Yes        Advance Care Planning   Advance Directives:   Living Will: No    LaPOST: No    Do Not Resuscitate Status: Yes    Medical Power of : No      Decision Making:  Patient answered questions  Goals of Care: The patient endorses that what is most important right now is to focus on improvement in condition but with limits to invasive therapies    Accordingly, we have decided that the best plan to meet the patient's goals includes continuing with treatment         Significant Labs: All pertinent labs within the past 24 hours have been reviewed.  CBC:   Recent Labs   Lab 07/31/23  0552   WBC 13.74*   HGB 8.0*   HCT 24.1*   MCV 98        BMP:  Recent Labs   Lab 07/31/23 0552   GLU 62*      K 3.5   CL 98   CO2 32*   BUN 5*   CREATININE 0.5   CALCIUM 8.5*   MG 1.8     LFT:  Lab Results   Component Value Date    AST 17 07/31/2023    ALKPHOS 42 (L) 07/31/2023    BILITOT 0.2 07/31/2023     Albumin:   Albumin   Date Value Ref Range Status   07/31/2023 3.0 (L) 3.5 - 5.2 g/dL Final     Protein:   Total Protein   Date Value Ref Range Status   07/31/2023 5.1 (L) 6.0 - 8.4 g/dL Final     Lactic acid:   Lab Results   Component  Value Date    LACTATE 1.0 03/12/2023       Significant Imaging: I have reviewed all pertinent imaging results/findings within the past 24 hours.    CTA chest 7/23/23  Impression:     1.  No CTA evidence pulmonary emboli     2.  Multiple malignant-appearing pulmonary nodules in left upper and right middle lobe     3.  Enlarging thick wall cavitary mass in left lung base

## 2023-07-31 NOTE — ASSESSMENT & PLAN NOTE
Expected with large losses from nosebleed with patient reported copious losses with large bleed at OSH and on admit here with still oozing, see picture above today  -hg 7.7 now from baseline 13 on admit at OSH  - given a unit PRBC as with orthostatic hypotension and dizziness. Hgb improved from 7.4 to 9.6

## 2023-07-31 NOTE — PROGRESS NOTES
Hospital Medicine  Progress note    [place] [facility] Ema Bryant MD  Admit Date: 7/26/2023    Principal Problem:  Epistaxis    Interval hx: pressure from packing in his right nostril improving. Still dizzy with standing. Patient states he feels better breathing with opioids. He wishes to pursue comfort measures.     ROS   Respiratory: neg for cough neg for shortness of breath  Cardiovascular: neg for chest pain neg for palpitations  Gastrointestinal: neg for nausea neg for vomiting, neg for abdominal pain neg for diarrhea neg for constipation   Behavioral/Psych: neg for depression neg for anxiety    PEx  Temp:  [96.2 °F (35.7 °C)-98.3 °F (36.8 °C)]   Pulse:  [63-80]   Resp:  [14-24]   BP: ()/(52-85)   SpO2:  [95 %-100 %]     Intake/Output Summary (Last 24 hours) at 7/30/2023 2010  Last data filed at 7/30/2023 1850  Gross per 24 hour   Intake 293.72 ml   Output 2475 ml   Net -2181.28 ml       General Appearance: no acute distress, WD, elderly, ill-appearing  Heart: regular rate and rhythm, no heave  Respiratory: Normal respiratory effort, symmetric excursion, bilateral vesicular breath sounds   Abdomen: Soft, non-tender; bowel sounds active  Skin: intact, no rash, no ulcers  Neurologic:  No focal numbness or weakness  Mental status: Alert, oriented x 4, affect appropriate     Recent Labs   Lab 07/29/23  0352 07/29/23  1601 07/30/23  0450 07/30/23  1634   WBC 15.33* 15.58* 15.87*  --    HGB 7.6* 7.7* 8.2* 8.3*   HCT 23.1* 22.4* 24.3* 25.1*    344 400  --        Recent Labs   Lab 07/28/23  0713 07/29/23  0352 07/30/23  0450   * 134* 135*   K 3.6 3.9 3.7   CL 92* 92* 92*   CO2 37* 36* 36*   BUN 9 5* 5*   CREATININE 0.5 0.6 0.5   GLU 85 82 84   CALCIUM 8.3* 8.5* 8.4*   MG 1.7 1.8 1.7   PHOS 2.0* 2.3* 2.9       Recent Labs   Lab 07/28/23  0713 07/29/23  0352 07/30/23  0450   ALKPHOS 39* 39* 41*   ALT 20 24 28   AST 19 17 18   ALBUMIN 2.8* 2.8* 3.0*   PROT 4.7* 4.6* 4.9*   BILITOT 0.2 0.2 0.2         Scheduled Meds:   albuterol sulfate  2.5 mg Nebulization Q6H WAKE    busPIRone  15 mg Oral TID    fluticasone furoate-vilanteroL  1 puff Inhalation Daily    mineral oil-hydrophil petrolat   Topical (Top) TID    nicotine  1 patch Transdermal Daily    sodium chloride  2 spray Each Nostril QID    tiotropium bromide  2 puff Inhalation Daily    white petrolatum   Topical (Top) QHS     Continuous Infusions:   sodium chloride 0.9%       As Needed:  HYDROcodone-acetaminophen, levalbuterol, sodium chloride 0.9%, traMADoL    Assessment and Plan  / Problems managed today    * Epistaxis  Started at OSH and rhinorocket failed, right nare packing absorbabe placed by ENT here, will auto dissolve  -completed afrin to bilateral nares q8 for 48 hours and PRN For bleeds with firm anterior pressure  -may place dressing udner nose for trickling, expect some red brown drainage  -nasal saline q4 hours while packing in place, aquaphor for anterior nares  -HOB elevated  -avoid NC, but patient uncomfortable with humidiifed face mask. Asked nursing to place nasal cannula on bubbler  -apply afrin if reoccurs  -if continues to ooze would reassess and discsus further with ENT    Orthostatic hypotension  Likely due to volume loss from blood loss  Improved 1L NS bolus, will give another liter at 100 mL/h    Positive blood culture  Coag neg staph on admit, contaminant, repeat NG      Acute blood loss anemia  Expected with large losses from nosebleed with patient reported cpious losses with large bleed at OSH and on admit here with still oozing, see picture above today  -hg 7.7 now from baseline 13 on admit at OSH  - follow H/H with hydration for volume      HLD (hyperlipidemia)  Cont statin      Chronic respiratory failure with hypoxia and hypercapnia  Patient with Hypercapnic and Hypoxic Respiratory failure which is Acute on chronic.  he is on home oxygen at 2-3 LPM. Supplemental oxygen was provided and noted-      .   Signs/symptoms of  respiratory failure include- increased work of breathing and wheezing. Contributing diagnoses includes - COPD Labs and images were reviewed. Patient Has not had a recent ABG. Will treat underlying causes and adjust management of respiratory failure as follows- contineu COPD treatments    Leukocytosis  Likely from steroids, trend      Cavitary lesion of lung  CTA neg for PE but known cavitary lesions and consistent with likely malignancy seen with previous malignant appearing lesions seen on prev CTS, LLL cavitary lesion larger  -AFB at OSH neg for stain  -present since t least 12,22  pET scan showing hypermetabolic activity in lesions before  -bronch in jan 2023 didn't show malignancy but suspet didn't get the right spot, needs f/u again with pulm to readdress long term      COPD exacerbation  Started tx for COPD exacerbation at OSH with prednisone, nebs, azithro/rocephin for coverage with reports of some dyspnea still with tightness in chest per patient, cont breo, spiriva  -long term needs tobacco cessation as sequelae including COPD and likely lung cancer being worked up  -wbc eleation likely from prednisone   resp CX with moraxella covered with above  Follows with pulm OP  Cont humidified facemask as ENT reports avoid NC for now  Goal O2 88% and above  Wears 2-3L at home      Tobacco dependence  Dangers of cigarette smoking were reviewed with patient in detail. Patient was Counseled for 3-10 minutes. Nicotine replacement options were discussed. Nicotine replacement was discussed- not prescribed per patient's request    Essential hypertension  Episode of hypotension with associated dizziness  Holding home hctz coreg  Orthostatic negative  Repeat in AM    Discharge Planning   SELVIN: 7/30/2023     Code Status: Partial Code   Is the patient medically ready for discharge?:     Reason for patient still in hospital (select all that apply): Patient trending condition and Treatment  Discharge Plan A: Home with family       Diet:  regular diet  GI PPx: not needed  DVT PPx:  CHIKI/SCD  Airways: room air  Wounds: none    Goals of Care:  Return to prior functional status     Time (minutes) spent in care of the patient including review of tests, flow sheets and notes since last visit, face to face contact, placing orders, communicating with consultants if needed, care coordination, and documentation: 35 min.    Ema Bryant MD

## 2023-07-31 NOTE — HPI
"Per critical care H&P  "Brando Culp Jr. Is a 64 yo M with PMH of HTN, stage IV COPD on 3L NC at home, HTN, cavitary lesion of the lung, and HLD, presenting from OSH for ENT evaluation for persistent epistaxis. He originially presented for worsening SOB with dyspnea just getting out of bed and required BIPAP and was admitted to the ICU. COPD exacerbation was improving s/p abx, steroids, and weaned back to home oxygen requirements when he had nasal hemorrhage 7/26/2023 concerning for posterior bleed with clots in back of oropharynx and failure of external hemostasis and rhino rocket to resolve bleeding. Vitals and hemoglobin stable but transferred for higher level of care. Transfer to [facility] for ENT evaluation. "    Epistaxis has now resolved. Palliative consulted to discuss GOC.   "

## 2023-08-01 NOTE — PLAN OF CARE
CHW met with patient/family at bedside. Patient experience rounding completed and reviewed the following.     Do you know your discharge plan? Yes,    If yes, what is the plan? Home    Have you discussed your needs and preferences with your SW/CM? Yes    If you are discharging home, do you have help at home? Yes    Do you think you will need help additional at home at discharge? Yes    Do you currently have difficulty keeping up with bills, affording medicine or buying food? No    Assigned SW/CM notified of any patient/family needs or concerns. Appropriate resources provided to address patient's needs.

## 2023-08-01 NOTE — PROGRESS NOTES
Hospital Medicine  Progress note    [place] [facility] Ema [place]  Admit Date: 7/26/2023    Principal Problem:  Orthostatic hypotension    Interval hx: still dizzy on standing, but improving    ROS   Respiratory: neg for cough neg for shortness of breath  Cardiovascular: neg for chest pain neg for palpitations  Gastrointestinal: neg for nausea neg for vomiting, neg for abdominal pain neg for diarrhea neg for constipation   Behavioral/Psych: neg for depression neg for anxiety    PEx  Temp:  [97.8 °F (36.6 °C)-98.2 °F (36.8 °C)]   Pulse:  [71-84]   Resp:  [13-20]   BP: (106-170)/(67-78)   SpO2:  [92 %-100 %]     Intake/Output Summary (Last 24 hours) at 8/1/2023 1246  Last data filed at 8/1/2023 1243  Gross per 24 hour   Intake 3954.49 ml   Output 5340 ml   Net -1385.51 ml       General Appearance: no acute distress, WD, elderly, ill-appearing  Heart: regular rate and rhythm, no heave  Respiratory: Normal respiratory effort, symmetric excursion, bilateral vesicular breath sounds   Abdomen: Soft, non-tender; bowel sounds active  Skin: intact, no rash, no ulcers  Neurologic:  No focal numbness or weakness  Mental status: Alert, oriented x 4, affect appropriate     Recent Labs   Lab 07/30/23  0450 07/30/23  1634 07/31/23  0552 08/01/23  0438   WBC 15.87*  --  13.74* 15.62*   HGB 8.2* 8.3* 8.0* 7.4*   HCT 24.3* 25.1* 24.1* 22.8*     --  438 445       Recent Labs   Lab 07/30/23 0450 07/31/23  0552 08/01/23  0438   * 138 136   K 3.7 3.5 3.7   CL 92* 98 99   CO2 36* 32* 29   BUN 5* 5* 4*   CREATININE 0.5 0.5 0.5   GLU 84 62* 84   CALCIUM 8.4* 8.5* 8.4*   MG 1.7 1.8 1.7   PHOS 2.9 2.4* 3.0       Recent Labs   Lab 07/30/23  0450 07/31/23  0552 08/01/23  0438   ALKPHOS 41* 42* 42*   ALT 28 25 24   AST 18 17 19   ALBUMIN 3.0* 3.0* 2.8*   PROT 4.9* 5.1* 4.8*   BILITOT 0.2 0.2 0.2        Scheduled Meds:   busPIRone  15 mg Oral TID    fluticasone furoate-vilanteroL  1 puff Inhalation Daily    mineral  oil-hydrophil petrolat   Topical (Top) TID    nicotine  1 patch Transdermal Daily    polyethylene glycol  17 g Oral Daily    sodium chloride  2 spray Each Nostril QID    sodium chloride  2,000 mg Oral TID    tiotropium bromide  2 puff Inhalation Daily    white petrolatum   Topical (Top) QHS     Continuous Infusions:   sodium chloride 0.9%         As Needed:  0.9%  NaCl infusion (for blood administration), albuterol sulfate, glucagon (human recombinant), glucose, glucose, HYDROcodone-acetaminophen, melatonin, naloxone, sodium chloride 0.9%, traMADoL    Assessment and Plan  / Problems managed today    * Orthostatic hypotension  Likely due to volume loss from blood loss  Not resolved despite 4L fluids. Will give one unit PRBC and 100 mL/h of fluids  Add 6 NaCl g/day to add to fluid retention. He is somewhat net negative despite adequate oral intake and IVFs    Epistaxis  Started at OSH and rhinorocket failed, right nare packing absorbabe placed by ENT here, will auto dissolve  -completed afrin to bilateral nares q8 for 48 hours and PRN For bleeds with firm anterior pressure  -may place dressing udner nose for trickling, expect some red brown drainage  -nasal saline q4 hours while packing in place, aquaphor for anterior nares  -HOB elevated  -avoid NC, but patient uncomfortable with humidiifed face mask. Asked nursing to place nasal cannula on bubbler  -apply afrin if reoccurs  -if continues to ooze would reassess and discsus further with ENT    Positive blood culture  Coag neg staph on admit, contaminant, repeat NG      Acute blood loss anemia  Expected with large losses from nosebleed with patient reported cpious losses with large bleed at OSH and on admit here with still oozing, see picture above today  -hg 7.7 now from baseline 13 on admit at OSH  - follow H/H with hydration for volume    HLD (hyperlipidemia)  Cont statin      Chronic respiratory failure with hypoxia and hypercapnia  Patient with Hypercapnic and  Hypoxic Respiratory failure which is Acute on chronic.  he is on home oxygen at 2-3 LPM. Supplemental oxygen was provided and noted-      .   Signs/symptoms of respiratory failure include- increased work of breathing and wheezing. Contributing diagnoses includes - COPD Labs and images were reviewed. Patient Has not had a recent ABG. Will treat underlying causes and adjust management of respiratory failure as follows- contineu COPD treatments    Leukocytosis  Likely from steroids, trend      Cavitary lesion of lung  CTA neg for PE but known cavitary lesions and consistent with likely malignancy seen with previous malignant appearing lesions seen on prev CTS, LLL cavitary lesion larger  -AFB at OSH neg for stain  -present since t least 12,22  pET scan showing hypermetabolic activity in lesions before  -bronch in jan 2023 didn't show malignancy but suspet didn't get the right spot, needs f/u again with pulm to readdress long term  - pulmonologist and family request consult to evaluate for options and associated risk of biopsy. Patient failed previous attempts for biopsy (x2) due to feeling unwell at the time. He is with progressive SOB. There is a question of his ability to go through the biopsy without respiratory failure requiring intubation.     COPD exacerbation  Started tx for COPD exacerbation at OSH with prednisone, nebs, azithro/rocephin for coverage with reports of some dyspnea still with tightness in chest per patient, cont breo, spiriva  -long term needs tobacco cessation as sequelae including COPD and likely lung cancer being worked up  -wbc eleation likely from prednisone   resp CX with moraxella covered with above  Follows with pulm OP  Cont humidified facemask as ENT reports avoid NC for now  Goal O2 88% and above  Wears 2-3L at home      Tobacco dependence  Dangers of cigarette smoking were reviewed with patient in detail. Patient was Counseled for 3-10 minutes. Nicotine replacement options were  discussed. Nicotine replacement was discussed- not prescribed per patient's request    Essential hypertension  Episode of hypotension with associated dizziness  Holding home hctz coreg  No orthostatic    Discharge Planning   SELVIN: 8/1/2023     Code Status: Partial Code   Is the patient medically ready for discharge?:     Reason for patient still in hospital (select all that apply): Patient trending condition and Treatment  Discharge Plan A: Home with family   Discharge Delays: None known at this time  Diet:  regular diet  GI PPx: not needed  DVT PPx:  CHIKI/SCD  Airways: room air  Wounds: none    Goals of Care:  Return to prior functional status     Time (minutes) spent in care of the patient including review of tests, flow sheets and notes since last visit, face to face contact, placing orders, communicating with consultants if needed, care coordination, and documentation: 35 min.    Ema Bryant MD

## 2023-08-01 NOTE — SUBJECTIVE & OBJECTIVE
Past Medical History:   Diagnosis Date    Abnormal CT scan     Arthritis     COPD (chronic obstructive pulmonary disease)     Hypertension        Past Surgical History:   Procedure Laterality Date    BRONCHOALVEOLAR LAVAGE N/A 1/18/2023    Procedure: LAVAGE, BRONCHOALVEOLAR;  Surgeon: Driss Bartholomew MD;  Location: [facility];  Service: Pulmonary;  Laterality: N/A;    BRONCHOSCOPY WITH BIOPSY Bilateral 1/18/2023    Procedure: BRONCHOSCOPY, WITH BIOPSY;  Surgeon: Driss Bartholomew MD;  Location: [facility] OR;  Service: Pulmonary;  Laterality: Bilateral;  site: lungs    BRONCHOSCOPY, WITH BRUSH BIOPSY Bilateral 1/18/2023    Procedure: BRONCHOSCOPY, WITH BRUSH BIOPSY;  Surgeon: Driss Bartholomew MD;  Location: [facility] OR;  Service: Pulmonary;  Laterality: Bilateral;    COLONOSCOPY      COLONOSCOPY N/A 8/21/2017    Procedure: COLONOSCOPY;  Surgeon: Jessica Fink MD;  Location: [facility];  Service: Endoscopy;  Laterality: N/A;       Review of patient's allergies indicates:  No Known Allergies    Family History       Problem Relation (Age of Onset)    Emphysema Mother    Heart failure Father          Tobacco Use    Smoking status: Every Day     Current packs/day: 0.50     Average packs/day: 0.5 packs/day for 44.3 years (22.2 ttl pk-yrs)     Types: Cigarettes     Start date: 4/8/1979    Smokeless tobacco: Never    Tobacco comments:        Substance and Sexual Activity    Alcohol use: Yes     Alcohol/week: 0.0 standard drinks of alcohol     Comment: once a week    Drug use: No    Sexual activity: Not Currently         Review of Systems   Constitutional:  Negative for chills, fever and unexpected weight change.   Respiratory:  Positive for shortness of breath (improved) and wheezing.    Cardiovascular:  Negative for chest pain and leg swelling.   Gastrointestinal:  Negative for nausea and vomiting.   Psychiatric/Behavioral:  Negative for agitation and confusion.      Objective:     Vital Signs (Most  Recent):  Temp: 98 °F (36.7 °C) (08/01/23 0745)  Pulse: 74 (08/01/23 0914)  Resp: 15 (08/01/23 0914)  BP: (!) 154/78 (08/01/23 0745)  SpO2: 99 % (08/01/23 0914) Vital Signs (24h Range):  Temp:  [97.8 °F (36.6 °C)-98.7 °F (37.1 °C)] 98 °F (36.7 °C)  Pulse:  [68-81] 74  Resp:  [14-20] 15  SpO2:  [92 %-100 %] 99 %  BP: (121-170)/(58-78) 154/78     Weight: 60.2 kg (132 lb 11.5 oz)  Body mass index is 19.04 kg/m².      Intake/Output Summary (Last 24 hours) at 8/1/2023 1009  Last data filed at 8/1/2023 0909  Gross per 24 hour   Intake 3804.49 ml   Output 5120 ml   Net -1315.51 ml        Physical Exam  Vitals reviewed.   Constitutional:       General: He is not in acute distress.     Appearance: He is ill-appearing.   HENT:      Head: Normocephalic and atraumatic.      Right Ear: External ear normal.      Left Ear: External ear normal.   Eyes:      General:         Right eye: No discharge.         Left eye: No discharge.      Conjunctiva/sclera: Conjunctivae normal.   Cardiovascular:      Rate and Rhythm: Normal rate and regular rhythm.      Pulses: Normal pulses.      Heart sounds: Normal heart sounds.   Pulmonary:      Breath sounds: Wheezing present.   Abdominal:      General: Abdomen is flat. There is no distension.   Musculoskeletal:      Cervical back: Normal range of motion and neck supple.      Right lower leg: No edema.      Left lower leg: No edema.   Skin:     General: Skin is warm and dry.   Neurological:      Mental Status: He is alert and oriented to person, place, and time. Mental status is at baseline.   Psychiatric:         Mood and Affect: Mood normal.         Behavior: Behavior normal.          Vents:  Oxygen Concentration (%): 80 (07/27/23 1005)    Lines/Drains/Airways       Peripheral Intravenous Line  Duration                  Peripheral IV - Single Lumen 08/01/23 0641 20 G Left;Posterior Wrist <1 day                    Significant Labs:    CBC/Anemia Profile:  Recent Labs   Lab 07/30/23  6051  07/31/23  0552 08/01/23  0438   WBC  --  13.74* 15.62*   HGB 8.3* 8.0* 7.4*   HCT 25.1* 24.1* 22.8*   PLT  --  438 445   MCV  --  98 100*   RDW  --  13.9 14.4        Chemistries:  Recent Labs   Lab 07/31/23  0552 08/01/23  0438    136   K 3.5 3.7   CL 98 99   CO2 32* 29   BUN 5* 4*   CREATININE 0.5 0.5   CALCIUM 8.5* 8.4*   ALBUMIN 3.0* 2.8*   PROT 5.1* 4.8*   BILITOT 0.2 0.2   ALKPHOS 42* 42*   ALT 25 24   AST 17 19   MG 1.8 1.7   PHOS 2.4* 3.0       All pertinent labs within the past 24 hours have been reviewed.    Significant Imaging:   I have reviewed all pertinent imaging results/findings within the past 24 hours.

## 2023-08-01 NOTE — CONSULTS
[facility] - Telemetry Stepdown  Pulmonology  Consult Note    Patient Name: Brando Culp Jr.  MRN: [id number]  Admission Date: 7/26/2023  Hospital Length of Stay: 6 days  Code Status: Partial Code  Attending Physician: Ema Bryant MD  Primary Care Provider: Dioni Matson MD   Principal Problem: Orthostatic hypotension    Inpatient consult to Pulmonology  Consult performed by: Jourdan Merritt MD  Consult ordered by: Ema Bryant MD        Subjective:     HPI:  63 year-old male with history of COPD, nicotine use disorder, HTN, HLD, cavitary lesion of lung s/p biopsy 1/2023 without any evidence of malignancy here in hospital for COPD exacerbation. Patient notes his symptoms from COPD exacerbation have improved since being treated. Patient got course of steroids and azithro/rocephin. Per patient and chart review he had bronchoscopy done in January 2023 with biopsy that did not show any evidence of malignancy. He also had PET scan done in March 2023 that showed a LORY nodule with significant activity that was suggestive of malignancy along with a lesion in the left lung base. Patient denies any weight loss of night sweats. He notes that he worked in three different [employer] for 23 years. He has no pets currently, his dog passed away six months ago. No mold in the house. Patient notes a long history of smoking but that he decreased his smoking to 6 cigarettes per day once he had the lung biopsy done. Patient notes that he was supposed to have another lung biopsy done soon. Pulmonology consulted to further evaluate options and associated risk of biopsy given his severe COPD and the question if he would have the ability to go through the biopsy given how severe his COPD is.            Past Medical History:   Diagnosis Date    Abnormal CT scan     Arthritis     COPD (chronic obstructive pulmonary disease)     Hypertension        Past Surgical History:   Procedure Laterality Date    BRONCHOALVEOLAR LAVAGE N/A  1/18/2023    Procedure: LAVAGE, BRONCHOALVEOLAR;  Surgeon: Driss Bartholomew MD;  Location: [facility];  Service: Pulmonary;  Laterality: N/A;    BRONCHOSCOPY WITH BIOPSY Bilateral 1/18/2023    Procedure: BRONCHOSCOPY, WITH BIOPSY;  Surgeon: Driss Bartholomew MD;  Location: [facility];  Service: Pulmonary;  Laterality: Bilateral;  site: lungs    BRONCHOSCOPY, WITH BRUSH BIOPSY Bilateral 1/18/2023    Procedure: BRONCHOSCOPY, WITH BRUSH BIOPSY;  Surgeon: Driss Bartholomew MD;  Location: [facility] OR;  Service: Pulmonary;  Laterality: Bilateral;    COLONOSCOPY      COLONOSCOPY N/A 8/21/2017    Procedure: COLONOSCOPY;  Surgeon: Jessica Fink MD;  Location: [facility];  Service: Endoscopy;  Laterality: N/A;       Review of patient's allergies indicates:  No Known Allergies    Family History       Problem Relation (Age of Onset)    Emphysema Mother    Heart failure Father          Tobacco Use    Smoking status: Every Day     Current packs/day: 0.50     Average packs/day: 0.5 packs/day for 44.3 years (22.2 ttl pk-yrs)     Types: Cigarettes     Start date: 4/8/1979    Smokeless tobacco: Never    Tobacco comments:        Substance and Sexual Activity    Alcohol use: Yes     Alcohol/week: 0.0 standard drinks of alcohol     Comment: once a week    Drug use: No    Sexual activity: Not Currently         Review of Systems   Constitutional:  Negative for chills, fever and unexpected weight change.   Respiratory:  Positive for shortness of breath (improved) and wheezing.    Cardiovascular:  Negative for chest pain and leg swelling.   Gastrointestinal:  Negative for nausea and vomiting.   Psychiatric/Behavioral:  Negative for agitation and confusion.      Objective:     Vital Signs (Most Recent):  Temp: 98 °F (36.7 °C) (08/01/23 0745)  Pulse: 74 (08/01/23 0914)  Resp: 15 (08/01/23 0914)  BP: (!) 154/78 (08/01/23 0745)  SpO2: 99 % (08/01/23 0914) Vital Signs (24h Range):  Temp:  [97.8 °F (36.6 °C)-98.7 °F (37.1  °C)] 98 °F (36.7 °C)  Pulse:  [68-81] 74  Resp:  [14-20] 15  SpO2:  [92 %-100 %] 99 %  BP: (121-170)/(58-78) 154/78     Weight: 60.2 kg (132 lb 11.5 oz)  Body mass index is 19.04 kg/m².      Intake/Output Summary (Last 24 hours) at 8/1/2023 1009  Last data filed at 8/1/2023 0909  Gross per 24 hour   Intake 3804.49 ml   Output 5120 ml   Net -1315.51 ml        Physical Exam  Vitals reviewed.   Constitutional:       General: He is not in acute distress.     Appearance: He is ill-appearing.   HENT:      Head: Normocephalic and atraumatic.      Right Ear: External ear normal.      Left Ear: External ear normal.   Eyes:      General:         Right eye: No discharge.         Left eye: No discharge.      Conjunctiva/sclera: Conjunctivae normal.   Cardiovascular:      Rate and Rhythm: Normal rate and regular rhythm.      Pulses: Normal pulses.      Heart sounds: Normal heart sounds.   Pulmonary:      Breath sounds: Wheezing present.   Abdominal:      General: Abdomen is flat. There is no distension.   Musculoskeletal:      Cervical back: Normal range of motion and neck supple.      Right lower leg: No edema.      Left lower leg: No edema.   Skin:     General: Skin is warm and dry.   Neurological:      Mental Status: He is alert and oriented to person, place, and time. Mental status is at baseline.   Psychiatric:         Mood and Affect: Mood normal.         Behavior: Behavior normal.          Vents:  Oxygen Concentration (%): 80 (07/27/23 1005)    Lines/Drains/Airways       Peripheral Intravenous Line  Duration                  Peripheral IV - Single Lumen 08/01/23 0641 20 G Left;Posterior Wrist <1 day                    Significant Labs:    CBC/Anemia Profile:  Recent Labs   Lab 07/30/23  1634 07/31/23  0552 08/01/23  0438   WBC  --  13.74* 15.62*   HGB 8.3* 8.0* 7.4*   HCT 25.1* 24.1* 22.8*   PLT  --  438 445   MCV  --  98 100*   RDW  --  13.9 14.4        Chemistries:  Recent Labs   Lab 07/31/23  0552 08/01/23  0438   NA  138 136   K 3.5 3.7   CL 98 99   CO2 32* 29   BUN 5* 4*   CREATININE 0.5 0.5   CALCIUM 8.5* 8.4*   ALBUMIN 3.0* 2.8*   PROT 5.1* 4.8*   BILITOT 0.2 0.2   ALKPHOS 42* 42*   ALT 25 24   AST 17 19   MG 1.8 1.7   PHOS 2.4* 3.0       All pertinent labs within the past 24 hours have been reviewed.    Significant Imaging:   I have reviewed all pertinent imaging results/findings within the past 24 hours.    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 Consulted for evaluation of cavitary lesion and risks associated with obtaining another biopsy given his severe COPD and the worry he would need to be intubated. Patient does have 23 year history of working in [employer] but no evidence of mesothelioma on CT imaging.    Differential diagnosis: lung malignancy, atypical infection (NTM).  Plan:  --patients CT findings appear stable and would recommend continuing to pursue outpatient biopsy to rule out malignancy or infection which appear most likely.  --no inpatient need for lung biopsy at this time.  --recommend optimizing COPD. See COPD section.  --recommend smoking cessation  --Palliative care team is consulted for goals of care: patient wishes to pursue lung biopsy and would consider medications after that.      COPD exacerbation  Patient treated at OSH for COPD exacerbation with steroid course, CAP coverage, and duo-nebs. Per chart review patient noted to have severe obstruction on spirometry back in 3/2022 and it was noted at that time his FEV1 had declined to 29% predicted. Gold Criteria: GOLD C. Patient notes that his symptoms are improved from when he first had  exacerbation.    Plan:  -Recommend continue triple therapy: tiotropium bromide and breo-ellipta  -recommend an additional 5 days of PO steroids 40 mg daily and then can taper dose until off  -goal SpO2: 88-92%  -encourage smoking cessation  -patient is on home oxygen  -recommend getting up out of bed to avoid deconditioning  - 6MWT prior to discharge to make sure he does not need more oxygen.          Thank you for your consult. I will sign off. Please contact us if you have any additional questions.     Jourdan Merritt MD  Pulmonology  [facility] - Telemetry Stepdown

## 2023-08-01 NOTE — HPI
63 year-old male with history of COPD, nicotine use disorder, HTN, HLD, cavitary lesion of lung s/p biopsy 1/2023 without any evidence of malignancy here in hospital for COPD exacerbation. Patient notes his symptoms from COPD exacerbation have improved since being treated. Patient got course of steroids and azithro/rocephin. Per patient and chart review he had bronchoscopy done in January 2023 with biopsy that did not show any evidence of malignancy. He also had PET scan done in March 2023 that showed a LORY nodule with significant activity that was suggestive of malignancy along with a lesion in the left lung base. Patient denies any weight loss of night sweats. He notes that he worked in three different [employer] for 23 years. He has no pets currently, his dog passed away six months ago. No mold in the house. Patient notes a long history of smoking but that he decreased his smoking to 6 cigarettes per day once he had the lung biopsy done. Patient notes that he was supposed to have another lung biopsy done soon. Pulmonology consulted to further evaluate options and associated risk of biopsy given his severe COPD and the question if he would have the ability to go through the biopsy given how severe his COPD is.

## 2023-08-01 NOTE — ASSESSMENT & PLAN NOTE
Patient treated at OSH for COPD exacerbation with steroid course, CAP coverage, and duo-nebs. Per chart review patient noted to have severe obstruction on spirometry back in 3/2022 and it was noted at that time his FEV1 had declined to 29% predicted. Gold Criteria: GOLD C. Patient notes that his symptoms are improved from when he first had exacerbation.    Plan:  -Recommend continue triple therapy: tiotropium bromide and breo-ellipta  -recommend an additional 5 days of PO steroids 40 mg daily  -goal SpO2: 88-92%  -encourage smoking cessation  -patient is on home oxygen  -recommend getting up out of bed to avoid deconditioning

## 2023-08-01 NOTE — PLAN OF CARE
[facility] - Telemetry Stepdown  Discharge Reassessment    Primary Care Provider: Dioni Matson MD    Expected Discharge Date: 8/1/2023    Reassessment (most recent)       Discharge Reassessment - 08/01/23 1317          Discharge Reassessment    Assessment Type Discharge Planning Reassessment     Did the patient's condition or plan change since previous assessment? No     Discharge Plan discussed with: Patient     Communicated SELVIN with patient/caregiver Yes     Discharge Plan A Home with family     Discharge Plan B Home with family     DME Needed Upon Discharge  none     Transition of Care Barriers None     Why the patient remains in the hospital Requires continued medical care        Post-Acute Status    Post-Acute Authorization Other     Other Status See Comments   [facility] and [facility] referrals ordered    Hospital Resources/Appts/Education Provided Appointments scheduled by Navigator/Coordinator     Discharge Delays None known at this time                 Per MD, patient is still orthostatic and is receiving blood and fluids. Potential dc later today if patient is medically stable. Patient reports his daughter will likely be able to pick him up at dc and bring his O2 tank.    Natalie Mitchell, LINDA  [facility]  Ext. [phone]

## 2023-08-01 NOTE — PROGRESS NOTES
Hospital Medicine  Progress note    Team: [facility] HOSP MED S Ema Bryant MD  Admit Date: 7/26/2023    Principal Problem:  Epistaxis    Interval hx: still dizzy on standing    ROS   Respiratory: neg for cough neg for shortness of breath  Cardiovascular: neg for chest pain neg for palpitations  Gastrointestinal: neg for nausea neg for vomiting, neg for abdominal pain neg for diarrhea neg for constipation   Behavioral/Psych: neg for depression neg for anxiety    PEx  Temp:  [97.7 °F (36.5 °C)-98.7 °F (37.1 °C)]   Pulse:  [68-79]   Resp:  [13-26]   BP: (121-170)/(58-77)   SpO2:  [96 %-100 %]     Intake/Output Summary (Last 24 hours) at 7/31/2023 2028  Last data filed at 7/31/2023 2015  Gross per 24 hour   Intake 4023.43 ml   Output 4195 ml   Net -171.57 ml       General Appearance: no acute distress, WD, elderly, ill-appearing  Heart: regular rate and rhythm, no heave  Respiratory: Normal respiratory effort, symmetric excursion, bilateral vesicular breath sounds   Abdomen: Soft, non-tender; bowel sounds active  Skin: intact, no rash, no ulcers  Neurologic:  No focal numbness or weakness  Mental status: Alert, oriented x 4, affect appropriate     Recent Labs   Lab 07/29/23  1601 07/30/23 0450 07/30/23  1634 07/31/23  0552   WBC 15.58* 15.87*  --  13.74*   HGB 7.7* 8.2* 8.3* 8.0*   HCT 22.4* 24.3* 25.1* 24.1*    400  --  438       Recent Labs   Lab 07/29/23  0352 07/30/23  0450 07/31/23  0552   * 135* 138   K 3.9 3.7 3.5   CL 92* 92* 98   CO2 36* 36* 32*   BUN 5* 5* 5*   CREATININE 0.6 0.5 0.5   GLU 82 84 62*   CALCIUM 8.5* 8.4* 8.5*   MG 1.8 1.7 1.8   PHOS 2.3* 2.9 2.4*       Recent Labs   Lab 07/29/23  0352 07/30/23  0450 07/31/23  0552   ALKPHOS 39* 41* 42*   ALT 24 28 25   AST 17 18 17   ALBUMIN 2.8* 3.0* 3.0*   PROT 4.6* 4.9* 5.1*   BILITOT 0.2 0.2 0.2        Scheduled Meds:   busPIRone  15 mg Oral TID    fluticasone furoate-vilanteroL  1 puff Inhalation Daily    mineral oil-hydrophil petrolat   Topical  (Top) TID    nicotine  1 patch Transdermal Daily    polyethylene glycol  17 g Oral Daily    sodium chloride  2 spray Each Nostril QID    tiotropium bromide  2 puff Inhalation Daily    white petrolatum   Topical (Top) QHS     Continuous Infusions:    As Needed:  albuterol sulfate, glucagon (human recombinant), glucose, glucose, HYDROcodone-acetaminophen, melatonin, naloxone, sodium chloride 0.9%, traMADoL    Assessment and Plan  / Problems managed today    * Orthostatic hypotension  Likely due to volume loss from blood loss  Improving with 2L fluids so far, still persisting. Will give another 2L    Epistaxis  Started at OSH and rhinorocket failed, right nare packing absorbabe placed by ENT here, will auto dissolve  -completed afrin to bilateral nares q8 for 48 hours and PRN For bleeds with firm anterior pressure  -may place dressing udner nose for trickling, expect some red brown drainage  -nasal saline q4 hours while packing in place, aquaphor for anterior nares  -HOB elevated  -avoid NC, but patient uncomfortable with humidiifed face mask. Asked nursing to place nasal cannula on bubbler  -apply afrin if reoccurs  -if continues to ooze would reassess and discsus further with ENT    Positive blood culture  Coag neg staph on admit, contaminant, repeat NG      Acute blood loss anemia  Expected with large losses from nosebleed with patient reported cpious losses with large bleed at OSH and on admit here with still oozing, see picture above today  -hg 7.7 now from baseline 13 on admit at OSH  - follow H/H with hydration for volume    HLD (hyperlipidemia)  Cont statin      Chronic respiratory failure with hypoxia and hypercapnia  Patient with Hypercapnic and Hypoxic Respiratory failure which is Acute on chronic.  he is on home oxygen at 2-3 LPM. Supplemental oxygen was provided and noted-      .   Signs/symptoms of respiratory failure include- increased work of breathing and wheezing. Contributing diagnoses includes -  COPD Labs and images were reviewed. Patient Has not had a recent ABG. Will treat underlying causes and adjust management of respiratory failure as follows- contineu COPD treatments    Leukocytosis  Likely from steroids, trend      Cavitary lesion of lung  CTA neg for PE but known cavitary lesions and consistent with likely malignancy seen with previous malignant appearing lesions seen on prev CTS, LLL cavitary lesion larger  -AFB at OSH neg for stain  -present since t least 12,22  pET scan showing hypermetabolic activity in lesions before  -bronch in jan 2023 didn't show malignancy but suspet didn't get the right spot, needs f/u again with pulm to readdress long term  - pulmonologist and family request consult to evaluate for options and associated risk of biopsy. Patient failed previous attempts for biopsy (x2) due to feeling unwell at the time. He is with progressive SOB. There is a question of his ability to go through the biopsy without respiratory failure requiring intubation.     COPD exacerbation  Started tx for COPD exacerbation at OSH with prednisone, nebs, azithro/rocephin for coverage with reports of some dyspnea still with tightness in chest per patient, cont breo, spiriva  -long term needs tobacco cessation as sequelae including COPD and likely lung cancer being worked up  -wbc eleation likely from prednisone   resp CX with moraxella covered with above  Follows with pulm OP  Cont humidified facemask as ENT reports avoid NC for now  Goal O2 88% and above  Wears 2-3L at home      Tobacco dependence  Dangers of cigarette smoking were reviewed with patient in detail. Patient was Counseled for 3-10 minutes. Nicotine replacement options were discussed. Nicotine replacement was discussed- not prescribed per patient's request    Essential hypertension  Episode of hypotension with associated dizziness  Holding home hctz coreg  No orthostatic    Discharge Planning   SELVIN: 8/3/2023     Code Status: Partial Code    Is the patient medically ready for discharge?:     Reason for patient still in hospital (select all that apply): Patient trending condition and Treatment  Discharge Plan A: Home with family      Diet:  regular diet  GI PPx: not needed  DVT PPx:  CHIKI/SCD  Airways: room air  Wounds: none    Goals of Care:  Return to prior functional status     Time (minutes) spent in care of the patient including review of tests, flow sheets and notes since last visit, face to face contact, placing orders, communicating with consultants if needed, care coordination, and documentation: 35 min.    Ema Bryant MD

## 2023-08-01 NOTE — ASSESSMENT & PLAN NOTE
CTA neg for PE but known cavitary lesions and consistent with likely malignancy seen with previous malignant appearing lesions seen on prev CTS, LLL cavitary lesion larger  -AFB at OSH neg for stain  -present since t least 12,22  pET scan showing hypermetabolic activity in lesions before  -bronch in jan 2023 didn't show malignancy but suspet didn't get the right spot, needs f/u again with pulm to readdress long term  - pulmonologist and family request consult to evaluate for options and associated risk of biopsy. Patient failed previous attempts for biopsy (x2) due to feeling unwell at the time. He is with progressive SOB.   -pulmonary consulted and recommended follow up with primary pulmonologist for further evaluation

## 2023-08-01 NOTE — PLAN OF CARE
Problem: Adult Inpatient Plan of Care  Goal: Plan of Care Review  Outcome: Ongoing, Progressing  Goal: Absence of Hospital-Acquired Illness or Injury  Outcome: Ongoing, Progressing  Goal: Optimal Comfort and Wellbeing  Outcome: Ongoing, Progressing     Problem: Coping Ineffective  Goal: Effective Coping  Outcome: Ongoing, Not Progressing                     Patient AOX4, VSS. On 2L NC. Received 1 unit PRBC. No adverse reactions noted. Tolerated well. States pain is 7/10. Medicated per order. SEE MAR. Bed low to the floor, call light within reach

## 2023-08-01 NOTE — ASSESSMENT & PLAN NOTE
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 Consulted for evaluation of cavitary lesion and risks associated with obtaining another biopsy given his severe COPD and the worry he would need to be intubated. Patient does have 23 year history of working in [employer] but no evidence of mesothelioma on CT imaging.    Differential diagnosis: lung malignancy, atypical infection (NTM).  Plan:  --patients CT findings appear stable and would recommend continuing to pursue outpatient biopsy to rule out malignancy or infection which appear most likely.  --no inpatient need for lung biopsy at this time.  --recommend optimizing COPD. See COPD section.  --recommend smoking cessation  --Palliative care team is consulted for goals of care: patient wishes to pursue lung biopsy and would consider medications after that.

## 2023-08-02 NOTE — PLAN OF CARE
[facility]      HOME HEALTH ORDERS  FACE TO FACE ENCOUNTER    Patient Name: Brando Culp Jr.  YOB: 1960    PCP: Dioni Matson MD   PCP Address: [address] / [place] [address]  PCP Phone Number: [phone]  PCP Fax: [phone]    Encounter Date: 7/26/23    Admit to Home Health    Diagnoses:  Active Diagnoses:    Diagnosis Date Noted POA    PRINCIPAL PROBLEM:  Orthostatic hypotension [I95.1] 07/30/2023 Yes    Epistaxis [R04.0] 07/26/2023 Yes    Palliative care encounter [Z51.5] 07/31/2023 Not Applicable    Acute blood loss anemia [D62] 07/28/2023 Yes    Positive blood culture [R78.81] 07/28/2023 Yes    HLD (hyperlipidemia) [E78.5] 07/23/2023 Yes    Chronic respiratory failure with hypoxia and hypercapnia [J96.11, J96.12] 03/18/2023 Yes    Leukocytosis [D72.829] 03/14/2023 Yes    Cavitary lesion of lung [J98.4] 01/18/2023 Yes    COPD exacerbation [J44.1] 11/08/2022 Yes    Tobacco dependence [F17.200] 04/06/2017 Yes    Essential hypertension [I10] 12/30/2015 Yes      Problems Resolved During this Admission:       Follow Up Appointments:  Future Appointments   Date Time Provider Department Center   8/7/2023  9:30 AM Mark Charles MD [facility] CCCLIN [facility]   8/18/2023 11:00 AM [facility] CT1 LIMIT 500 LBS [facility] CT SCAN [facility]   8/18/2023 12:40 PM Kelly Rogers MD [facility] PULM [place] [facility]       Allergies:Review of patient's allergies indicates:  No Known Allergies    Medications: Review discharge medications with patient and family and provide education.  Current Discharge Medication List        START taking these medications    Details   mineral oil-hydrophil petrolat Oint Apply topically 3 (three) times daily. Apply to anterior nares  Qty: 50 g, Refills: 0      nicotine (NICODERM CQ) 14 mg/24 hr Place 1 patch onto the skin once daily.  Qty: 28 patch, Refills: 11      sodium chloride (OCEAN) 0.65 % nasal spray 2 sprays by Nasal route 4 (four) times daily. indefinitely  Qty: 60 mL, Refills: 12       traMADoL 100 mg Tab Take 100 mg by mouth every 6 (six) hours as needed for Pain.           CONTINUE these medications which have CHANGED    Details   busPIRone (BUSPAR) 5 MG Tab Take 3 tablets (15 mg total) by mouth 3 (three) times daily.  Qty: 270 tablet, Refills: 0    Associated Diagnoses: Anxiety and depression      predniSONE (DELTASONE) 5 MG tablet Take 8 tablets (40 mg total) by mouth once daily for 2 days, THEN 6 tablets (30 mg total) once daily for 2 days, THEN 4 tablets (20 mg total) once daily for 2 days, THEN 2 tablets (10 mg total) once daily for 2 days, THEN 1 tablet (5 mg total) once daily for 7 days.  Qty: 47 tablet, Refills: 0           CONTINUE these medications which have NOT CHANGED    Details   albuterol (PROVENTIL) 2.5 mg /3 mL (0.083 %) nebulizer solution Take 3 mLs (2.5 mg total) by nebulization every 6 (six) hours as needed for Wheezing. Rescue  Qty: 300 mL, Refills: 11      carvediloL (COREG) 25 MG tablet Take 1 tablet (25 mg total) by mouth 2 (two) times daily with meals.  Qty: 180 tablet, Refills: 1    Comments: .  Associated Diagnoses: Essential hypertension      HYDROcodone-acetaminophen (NORCO)  mg per tablet Take 1 tablet by mouth every 4 (four) hours as needed for Pain.  Qty: 180 tablet, Refills: 0    Comments: Quantity prescribed more than 7 day supply? Yes, quantity medically necessary  Associated Diagnoses: Chronic bilateral low back pain with bilateral sciatica      tiotropium (SPIRIVA WITH HANDIHALER) 18 mcg inhalation capsule inhale 1 capsule into the lungs every day  Qty: 30 capsule, Refills: 11      albuterol (PROVENTIL/VENTOLIN HFA) 90 mcg/actuation inhaler Inhale 1-2 puffs into the lungs every 6 (six) hours as needed for Wheezing. Rescue  Qty: 18 g, Refills: 6      budesonide-formoterol 80-4.5 mcg (SYMBICORT) 80-4.5 mcg/actuation HFAA Inhale 2 puffs into the lungs 2 (two) times daily.  Qty: 10.2 g, Refills: 11    Associated Diagnoses: Stage 4 very severe COPD by  GOLD classification      LIPITOR 40 mg tablet Take 1 tablet (40 mg total) by mouth once daily.  Qty: 90 tablet, Refills: 3    Associated Diagnoses: Hyperlipidemia, unspecified hyperlipidemia type      ramelteon (ROZEREM) 8 mg tablet Take 1 tablet (8 mg total) by mouth nightly.  Qty: 30 tablet, Refills: 4    Associated Diagnoses: Primary insomnia           STOP taking these medications       hydroCHLOROthiazide (HYDRODIURIL) 12.5 MG Tab Comments:   Reason for Stopping:         ipratropium (ATROVENT) 0.02 % nebulizer solution Comments:   Reason for Stopping:         PULMO-AIDE COMPRESSOR Eva Comments:   Reason for Stopping:                 I have seen and examined this patient within the last 30 days. My clinical findings that support the need for the home health skilled services and home bound status are the following:  Weakness/numbness causing balance and gait disturbance due to COPD Exacerbation and Weakness/Debility making it taxing to leave home.  Requiring assistive device to leave home due to unsteady gait caused by  COPD Exacerbation and Weakness/Debility.     Diet:   regular diet    Referrals/ Consults  Physical Therapy to evaluate and treat. Evaluate for home safety and equipment needs; Establish/upgrade home exercise program. Perform / instruct on therapeutic exercises, gait training, transfer training, and Range of Motion. Please provide exercises for orthostatic hypotension    Occupational Therapy to evaluate and treat. Evaluate home environment for safety and equipment needs. Perform/Instruct on transfers, ADL training, ROM, and therapeutic exercises.      Activities:   activity as tolerated    Nursing:   Agency to admit patient within 24 hours of hospital discharge unless specified on physician order or at patient request    SN to complete comprehensive assessment including routine vital signs. Instruct on disease process and s/s of complications to report to MD. Review/verify medication list sent  home with the patient at time of discharge  and instruct patient/caregiver as needed. Frequency may be adjusted depending on start of care date.     Skilled nurse to perform up to 3 visits PRN for symptoms related to diagnosis    Notify MD if SBP > 160 or < 90; DBP > 90 or < 50; HR > 120 or < 50; Temp > 101; O2 < 88%;    Ok to schedule additional visits based on staff availability and patient request on consecutive days within the home health episode.    Miscellaneous   Home Oxygen:  Oxygen at 2 L/min nasal canula to be used:  Continuously.    Wound Care Orders  no    I certify that this patient is confined to his home and needs intermittent skilled nursing care, physical therapy, and occupational therapy.

## 2023-08-02 NOTE — PLAN OF CARE
08/02/23 1230   Post-Acute Status   Post-Acute Authorization Home Health     Spoke with patient at bedside regarding HH. Patient would like to review the  list with his daughter when she arrives this afternoon. Provided patient with  list and requested for patient or daughter to notify SW of their preference so a referral can be sent today.    2:03 PM  Spoke with patient's daughter, Michelle, who reports first preference is [facility], second preference is [facility] . SW sent referral in [facility] to [facility] and notified Leidy in admissions.    2:16 PM  Patient has been accepted by [facility].    Natalie Mitchell, LCSW [facility]  Ext. [phone]

## 2023-08-02 NOTE — PLAN OF CARE
[facility] - Telemetry Stepdown  Discharge Final Note    Primary Care Provider: Dioni Matson MD    Expected Discharge Date: 8/2/2023    Final Discharge Note (most recent)       Final Note - 08/02/23 1417          Final Note    Assessment Type Final Discharge Note     Anticipated Discharge Disposition Home-Health Care [facility] Resources/Appts/Education Provided Appointments scheduled by Navigator/Coordinator        Post-Acute Status    Post-Acute Authorization Home Health     Home Health Status Set-up Complete/Auth obtained   [facility]    Discharge Delays None known at this time                     Important Message from Medicare  Important Message from Medicare regarding Discharge Appeal Rights: Given to patient/caregiver, Explained to patient/caregiver, Signed/date by patient/caregiver     Date IMM was signed: 08/01/23  Time IMM was signed: 1053    Contact Info       [facility]   Specialty: Home Health Services, Home Therapy Services, Home Living Aide Services    [address] [address]   Phone: [phone]       Next Steps: Follow up    Instructions: Home Health          Future Appointments   Date Time Provider Department Center   8/7/2023  9:30 AM [facility]   8/18/2023 11:00 AM [facility] CT1 LIMIT 500 LBS [facility]   8/18/2023 12:40 PM Kelly Rogers MD [facility] PULM NICHOLAS JESENIA Mitchell, LCSW [facility]  Ext. [phone]

## 2023-08-02 NOTE — PLAN OF CARE
Problem: Adult Inpatient Plan of Care  Goal: Plan of Care Review  Outcome: Met  Goal: Patient-Specific Goal (Individualized)  Outcome: Met  Goal: Absence of Hospital-Acquired Illness or Injury  Outcome: Met  Goal: Optimal Comfort and Wellbeing  Outcome: Met  Goal: Readiness for Transition of Care  Outcome: Met                   Patient AOX4, VSS. PIV removed and AVS printed and reviewed with the patient and daughter. All questions answered. Patient discharged with cellphone, personal belonging bag.

## 2023-08-03 NOTE — PROGRESS NOTES
C3 nurse spoke with Brando Culp Jr. Daughter Michelle for a TCC post hospital discharge follow up call. The patient has a scheduled HOSFU appointment with Mark Charles MD 8/7/23 @9:30.

## 2023-08-12 NOTE — DISCHARGE SUMMARY
Discharge Summary  Hospital Medicine    Attending Provider on Discharge: Ema Bryant MD  [facility] Medicine Team: [facility] HOSP MED S  Date of Admission:  7/26/2023     Date of Discharge:  8/2/2023  Code status: Full Code    Active Hospital Problems    Diagnosis  POA    *Orthostatic hypotension [I95.1]  Yes     Priority: 1 - High    Epistaxis [R04.0]  Yes     Priority: 1 - High    Palliative care encounter [Z51.5]  Not Applicable    Acute blood loss anemia [D62]  Yes    Positive blood culture [R78.81]  Yes    HLD (hyperlipidemia) [E78.5]  Yes    Chronic respiratory failure with hypoxia and hypercapnia [J96.11, J96.12]  Yes    Leukocytosis [D72.829]  Yes    Cavitary lesion of lung [J98.4]  Yes    COPD exacerbation [J44.1]  Yes    Tobacco dependence [F17.200]  Yes    Essential hypertension [I10]  Yes      Resolved Hospital Problems   No resolved problems to display.     HPI  This is a 63 y old male with PMHx of COPD stage 4, chronic resp failure on 3L NC at home, HTN, tobacco abuse, arthritis who presents to the ED for SOB that began at midnight. He reports he is unable to get out of bed without getting SOB, went to sleep but woke up at 7AM with continued SOB and dyspnea. Pt reports he lied down and had to wait 5 minutes to catch his breath before he could go to sleep. Chronically on 3L NC at home. Endorses increased SOB, increased frequency of cough with blood tinged sputum and some color change from clear to yellow sputum. Has had two prior episodes of COPD exacerbation in past year requiring ED evaluation or admission. Also endorses chest tightness, wheezing and chest pain that is worse with breathing. He is [facility], reports being compliant with his home inhalers. Current smoker, however has decreased to 4-5 cigarettes a day for the past 3 months. Uses THC occasionally. Drinks one 6 pack beer daily. Of note, patient with multiple pulmonary nodules and a cavitary nodule in L lower lung. Was  planned for biopsy however patient missed appointment in April due to anxiety. Scheduled for Pulm follow up on 8/18/23.     In ED, initially SOB with accessory muscle usage noted. Required BIPAP on arrival but weaned off and now at baseline home O2. Given 1 dose IV solumedrol with significant improvement in sxs but continues to have diffuse wheezing on exam.    Hospital Course  * Orthostatic hypotension  With dizziness. Improved but not resolved with 6L of fluids and one unit PRBC. His output was somewhat net negative despite adequate oral intake and IVFs with normal GFR. Likely chronic but worsened due to acute blood loss. Dizziness resolved after PRBC unit transfusion.     Epistaxis  Started at OSH and rhinorocket failed, right nare packing absorbabe placed by ENT here, will auto dissolve. Completed afrin to bilateral nares q8 for 48 hours and PRN For bleeds with firm anterior pressure. May place dressing udner nose for trickling, expect some red brown drainage. Nasal saline q4 hours while packing in place, aquaphor for anterior nares. ENT recommended patient to avoid NC, but patient uncomfortable with humidified face mask. Asked nursing to place nasal cannula on bubbler. Apply afrin if reoccurs. Continue nasal saline and aquaphor at discharge.     Positive blood culture  Coag neg staph on admit, contaminant, repeat NG      Acute blood loss anemia  Expected with large losses from nosebleed with patient reported copious losses with large bleed at OSH and on admit here with still oozing, see picture above today  -hg 7.7 now from baseline 13 on admit at OSH  - given a unit PRBC as with orthostatic hypotension and dizziness. Hgb improved from 7.4 to 9.6    HLD (hyperlipidemia)  Cont statin      Chronic respiratory failure with hypoxia and hypercapnia  Patient with Hypercapnic and Hypoxic Respiratory failure which is Acute on chronic.  he is on home oxygen at 2-3 LPM. Supplemental oxygen was provided and noted-      .    Signs/symptoms of respiratory failure include- increased work of breathing and wheezing. Contributing diagnoses includes - COPD Labs and images were reviewed. Patient Has not had a recent ABG. Will treat underlying causes and adjust management of respiratory failure as follows- contineu COPD treatments    Leukocytosis  Likely from steroids, trend      Cavitary lesion of lung  CTA neg for PE but known cavitary lesions and consistent with likely malignancy seen with previous malignant appearing lesions seen on prev CTS, LLL cavitary lesion larger  -AFB at OSH neg for stain  -present since t least 12,22  pET scan showing hypermetabolic activity in lesions before  -bronch in jan 2023 didn't show malignancy but suspet didn't get the right spot, needs f/u again with pulm to readdress long term  - pulmonologist and family request consult to evaluate for options and associated risk of biopsy. Patient failed previous attempts for biopsy (x2) due to feeling unwell at the time. He is with progressive SOB.   -pulmonary consulted and recommended follow up with primary pulmonologist for further evaluation    COPD exacerbation  Started tx for COPD exacerbation at OSH with prednisone, nebs, azithro/rocephin for coverage with reports of some dyspnea still with tightness in chest per patient, cont breo, spiriva  -long term needs tobacco cessation as sequelae including COPD and likely lung cancer being worked up  -wbc eleation likely from prednisone   resp CX with moraxella covered with above  Follows with pulm OP  Cont humidified facemask as ENT reports avoid NC for now  Goal O2 88% and above  Wears 2-3L at home      Tobacco dependence  Dangers of cigarette smoking were reviewed with patient in detail. Patient was Counseled for 3-10 minutes. Nicotine replacement options were discussed. Nicotine replacement was discussed- not prescribed per patient's request    Essential hypertension  Episode of hypotension with associated  dizziness  Holding home hctz coreg  No orthostatic    Procedures: none    Consultants: pulmonary, ENT, palliative care    Discharge Medication List as of 8/2/2023  1:32 PM        START taking these medications    Details   nicotine (NICODERM CQ) 14 mg/24 hr Place 1 patch onto the skin once daily., Starting Mon 7/31/2023, Normal      traMADoL 100 mg Tab Take 100 mg by mouth every 6 (six) hours as needed for Pain., Starting Tue 8/1/2023, No Print           CONTINUE these medications which have CHANGED    Details   busPIRone (BUSPAR) 5 MG Tab Take 3 tablets (15 mg total) by mouth 3 (three) times daily., Starting Sun 7/30/2023, Until Tue 8/29/2023, No Print      mineral oil-hydrophil petrolat Oint Apply topically 3 (three) times daily. Apply to anterior nares, Starting Tue 8/1/2023, Normal      predniSONE (DELTASONE) 5 MG tablet Multiple Dosages:Starting Thu 8/3/2023, Until Fri 8/4/2023 at 2359, THEN Starting Sat 8/5/2023, Until Sun 8/6/2023 at 2359, THEN Starting Mon 8/7/2023, Until Tue 8/8/2023 at 2359, THEN Starting Wed 8/9/2023, Until Thu 8/10/2023 at 2359, THEN Startin g Fri 8/11/2023, Until Thu 8/17/2023 at 2359Take 8 tablets (40 mg total) by mouth once daily for 2 days, THEN 6 tablets (30 mg total) once daily for 2 days, THEN 4 tablets (20 mg total) once daily for 2 days, THEN 2 tablets (10 mg total) once daily for  2 days, THEN 1 tablet (5 mg total) once daily for 7 days., Normal      sodium chloride (OCEAN) 0.65 % nasal spray 2 sprays by Nasal route 4 (four) times daily. indefinitely, Starting Tue 8/1/2023, Normal           CONTINUE these medications which have NOT CHANGED    Details   albuterol (PROVENTIL) 2.5 mg /3 mL (0.083 %) nebulizer solution Take 3 mLs (2.5 mg total) by nebulization every 6 (six) hours as needed for Wheezing. Rescue, Starting Thu 6/15/2023, Until Fri 6/14/2024 at 2359, Normal      albuterol (PROVENTIL/VENTOLIN HFA) 90 mcg/actuation inhaler Inhale 1-2 puffs into the lungs every 6 (six)  hours as needed for Wheezing. Rescue, Starting Thu 2/2/2023, Normal      budesonide-formoterol 80-4.5 mcg (SYMBICORT) 80-4.5 mcg/actuation HFAA Inhale 2 puffs into the lungs 2 (two) times daily., Starting Fri 6/2/2023, Until Sat 6/1/2024, Normal      HYDROcodone-acetaminophen (NORCO)  mg per tablet Take 1 tablet by mouth every 4 (four) hours as needed for Pain., Starting Wed 7/5/2023, Until Fri 8/4/2023 at 2359, Print      tiotropium (SPIRIVA WITH HANDIHALER) 18 mcg inhalation capsule inhale 1 capsule into the lungs every day, Starting Thu 3/23/2023, Normal      carvediloL (COREG) 25 MG tablet Take 1 tablet (25 mg total) by mouth 2 (two) times daily with meals., Starting Mon 8/15/2022, Normal      LIPITOR 40 mg tablet Take 1 tablet (40 mg total) by mouth once daily., Starting Mon 8/15/2022, Until Tue 8/15/2023, Normal      ramelteon (ROZEREM) 8 mg tablet Take 1 tablet (8 mg total) by mouth nightly., Starting Thu 4/20/2023, Normal           STOP taking these medications       hydroCHLOROthiazide (HYDRODIURIL) 12.5 MG Tab Comments:   Reason for Stopping:         ipratropium (ATROVENT) 0.02 % nebulizer solution Comments:   Reason for Stopping:         PULMO-AIDE COMPRESSOR Eva Comments:   Reason for Stopping:               Discharge Diet:regular diet     Activity: activity as tolerated    Discharge Condition: Good    Disposition: Home or Self Care    Tests pending at the time of discharge: none      Time spent  on the discharge of the patient including review of hospital course with the patient. reviewing discharge medications and arranging follow-up care 35 min    Discharge examination Patient was seen and examined on the date of discharge and determined to be suitable for discharge.    Discharge plan     Future Appointments   Date Time Provider Department Center   8/18/2023 11:00 AM [facility] CT1 LIMIT 500 LBS [facility] CT SCAN [facility]   8/18/2023 12:40 PM Kelly Rogers MD [facility] PULM [facility]   3/26/2024  8:00  Mark Soriano MD [facility]       Ema Bryant MD

## 2023-08-25 NOTE — ASSESSMENT & PLAN NOTE
Pt. Here today for follow up.      Transferred from OSH for ENT review. Rhinorocket applied at outside hospital. ENT evaluated at bedside and recommend:    - right nare absorbable packing will auto-dissolve  - Afrin to bilateral nares q8h for the next 48h and PRN for bleeds with firm anterior pressure.  - May place mustache dressing under nose for trickling, ok if dressing becomes saturated with some red/brown drainage  - Nasal saline q4h while packing in place; with nightly application of vaseline/aquaphor to anterior nares  - Keep head of bed elevated  - Apply Afrin to affected nasal cavity if epistaxis recurs, please see detailed instructions below   - avoid nasal cannula. Humidified facemask oxygen if possible.  - Trend CBC. Type and screen sent. Transfuse if hgb < 7

## 2023-09-06 NOTE — ED PROVIDER NOTES
Encounter Date: 9/5/2023       History     Chief Complaint   Patient presents with    Fall     Pt to ED for further evaluation from fall that occurred yesterday. Pt reports worsening of left foot and left wrist pain. Pt was seen by [facility] last night.      63 year old male with a PMHx of COPD, HTN presents to the ED with left foot and left wrist pain after a fall yesterday off 4 feet deck. Was seen at [facility] last night and discharged with 12 norco 5-325mg.     He was sent to [facility] via ambulance where they did CTs and x-rays. Patient states they did not image his left wrist.     He was discharged home with pain medication but states his pain isn't controlled. He is normally on tramadol. States his left foot is bruised and swollen.     I called [facility] to request patient's imaging from yesterday. [facility] faxed the CT lspine, CT cspine, CT head without acute fractures, subluxations. Xrays of the right hand, left foot without acute fractures.        Review of patient's allergies indicates:  No Known Allergies  Past Medical History:   Diagnosis Date    Abnormal CT scan     Arthritis     COPD (chronic obstructive pulmonary disease)     Hypertension      Past Surgical History:   Procedure Laterality Date    BRONCHOALVEOLAR LAVAGE N/A 1/18/2023    Procedure: LAVAGE, BRONCHOALVEOLAR;  Surgeon: Driss Bartholomew MD;  Location: [facility];  Service: Pulmonary;  Laterality: N/A;    BRONCHOSCOPY WITH BIOPSY Bilateral 1/18/2023    Procedure: BRONCHOSCOPY, WITH BIOPSY;  Surgeon: Driss Bartholomew MD;  Location: [facility];  Service: Pulmonary;  Laterality: Bilateral;  site: lungs    BRONCHOSCOPY, WITH BRUSH BIOPSY Bilateral 1/18/2023    Procedure: BRONCHOSCOPY, WITH BRUSH BIOPSY;  Surgeon: Driss Bartholomew MD;  Location: [facility];  Service: Pulmonary;  Laterality: Bilateral;    COLONOSCOPY      COLONOSCOPY N/A 8/21/2017    Procedure: COLONOSCOPY;  Surgeon: Jessica Fink MD;  Location: [facility];   Service: Endoscopy;  Laterality: N/A;     Family History   Problem Relation Age of Onset    Emphysema Mother     Heart failure Father      Social History     Tobacco Use    Smoking status: Every Day     Current packs/day: 0.50     Average packs/day: 0.5 packs/day for 44.4 years (22.2 ttl pk-yrs)     Types: Cigarettes     Start date: 4/8/1979    Smokeless tobacco: Never    Tobacco comments:        Substance Use Topics    Alcohol use: Yes     Alcohol/week: 0.0 standard drinks of alcohol     Comment: once a week    Drug use: No     Review of Systems   Constitutional:  Negative for chills and fever.   HENT:  Negative for congestion, rhinorrhea and sneezing.    Eyes:  Negative for discharge and redness.   Respiratory:  Negative for cough and shortness of breath.    Cardiovascular:  Negative for chest pain and palpitations.   Gastrointestinal:  Negative for abdominal pain, diarrhea and vomiting.   Genitourinary:  Negative for difficulty urinating, flank pain and urgency.   Musculoskeletal:  Positive for arthralgias, gait problem and neck pain. Negative for back pain.   Skin:  Positive for color change. Negative for rash and wound.   Neurological:  Positive for headaches. Negative for weakness and numbness.       Physical Exam     Initial Vitals [09/05/23 1952]   BP Pulse Resp Temp SpO2   (!) 145/77 94 20 96.9 °F (36.1 °C) 95 %      MAP       --         Physical Exam    Nursing note and vitals reviewed.  Constitutional: He appears well-developed. He is not diaphoretic. No distress.   HENT:   Head: Normocephalic.   Right Ear: External ear normal.   Left Ear: External ear normal.   Nose: Nose normal.   Right forehead laceration repaired; wound closed and not bleeding    Right black eye noted   Eyes: Conjunctivae are normal. Right eye exhibits no discharge. Left eye exhibits no discharge. No scleral icterus.   Cardiovascular:  Normal rate and regular rhythm.           Pulmonary/Chest: No stridor. No respiratory distress. He  has decreased breath sounds. He has no wheezes. He has no rhonchi. He has no rales.   Abdominal: Abdomen is soft. He exhibits no distension. There is no abdominal tenderness. There is no guarding.   Musculoskeletal:         General: No edema.      Left wrist: Tenderness present. No bony tenderness or snuff box tenderness. Decreased range of motion.      Cervical back: No bony tenderness.      Thoracic back: No bony tenderness.      Lumbar back: Tenderness and bony tenderness present.      Left foot: Decreased range of motion. Tenderness and bony tenderness present.      Comments: Right hand in splint with good sensation, good cap refill, no concerns for compartment syndrome    Left wrist pain    Left foot bruising and pain     Neurological: He is alert and oriented to person, place, and time. GCS score is 15. GCS eye subscore is 4. GCS verbal subscore is 5. GCS motor subscore is 6.   Skin: Skin is warm and dry. Capillary refill takes less than 2 seconds.   Psychiatric: He has a normal mood and affect.         ED Course   Procedures  Labs Reviewed - No data to display       Imaging Results              X-Ray Wrist Complete Left (Final result)  Result time 09/05/23 22:09:46      Final result by Adi Giron MD (09/05/23 22:09:46)                   Impression:      No acute radiographic abnormality.  See above comments.      Electronically signed by: Adi Giron  Date:    09/05/2023  Time:    22:09               Narrative:    EXAMINATION:  XR WRIST COMPLETE 3 VIEWS LEFT    CLINICAL HISTORY:  Unspecified fall, initial encounter    TECHNIQUE:  PA, lateral, and oblique views of the left wrist were performed.    COMPARISON:  12/30/2015    FINDINGS:  Slight irregularity of the distal ulna appears similar to the prior study and could be associated with a remote trauma.    No acute fracture, subluxation or dislocation is identified.  Mild degenerative changes.                                       Medications    morphine injection 6 mg (6 mg Intramuscular Given 9/5/23 2001)   ondansetron disintegrating tablet 4 mg (4 mg Oral Given 9/5/23 2001)   HYDROmorphone injection 1 mg (1 mg Intramuscular Given 9/5/23 2039)     Medical Decision Making  Ddx: wrist fx, scaphoid fx, foot fx, compartment syndrome    Based on the patient's evaluation - patient appears well for discharge home. Radiology reports from [facility] reviewed without acute fractures, subluxations. Added a left wrist x-ray which was don't yesterday and it was negative. Left toes bruised, swollen. Sensation intact, able to bend/wiggle with low concern for compartment syndrome. No fracture on foot x-ray done yesterday. Pain control may be challenging given tolerance for pain medication but unable to add any more narcotics to his repertoire.     Will recommend ice and PCP f/u. He has norco and tramdol already. Patient is in agreement.    Amount and/or Complexity of Data Reviewed  Radiology: ordered.    Risk  Prescription drug management.                               Clinical Impression:   Final diagnoses:  [W19.XXXA] Fall (Primary)  [M25.532] Wrist pain, acute, left        ED Disposition Condition    Discharge Stable          ED Prescriptions    None       Follow-up Information       Follow up With Specialties Details Why Contact Info    Dioni Matson MD Internal Medicine Schedule an appointment as soon as possible for a visit in 3 days  [address]  [place] [address]  [phone]               Eliezer Qureshi DO  09/05/23 [id number]

## 2023-09-06 NOTE — ED NOTES
Pt daughter is requesting for pt to be placed on O2. Daughter states that he is on 3L of continuous oxygen at home, but pt only wears it when needed. Hx of COPD, currently at 93% RA. Pt denies any SOB. Placed on 3L NC for comfort.

## 2023-09-13 PROBLEM — R78.81 POSITIVE BLOOD CULTURE: Status: RESOLVED | Noted: 2023-01-01 | Resolved: 2023-01-01

## 2023-09-13 PROBLEM — D62 ACUTE BLOOD LOSS ANEMIA: Status: RESOLVED | Noted: 2023-01-01 | Resolved: 2023-01-01

## 2023-10-30 PROBLEM — J96.11 CHRONIC RESPIRATORY FAILURE WITH HYPOXIA AND HYPERCAPNIA: Status: RESOLVED | Noted: 2023-01-01 | Resolved: 2023-01-01

## 2023-10-30 PROBLEM — J96.12 CHRONIC RESPIRATORY FAILURE WITH HYPOXIA AND HYPERCAPNIA: Status: RESOLVED | Noted: 2023-01-01 | Resolved: 2023-01-01

## 2023-11-03 NOTE — PROGRESS NOTES
CHW - Outreach Attempt    Community Health Worker left a voicemail message for 1st attempt to contact patient regarding: SDOH  Community Health Worker to attempt to contact patient on: [phone]

## 2023-11-07 NOTE — PROGRESS NOTES
Mr. Culp daughter, Michelle informed her father is currently on hospice. Patient needs will be followed by hospice.